# Patient Record
Sex: FEMALE | Race: WHITE | NOT HISPANIC OR LATINO | Employment: PART TIME | URBAN - NONMETROPOLITAN AREA
[De-identification: names, ages, dates, MRNs, and addresses within clinical notes are randomized per-mention and may not be internally consistent; named-entity substitution may affect disease eponyms.]

---

## 2017-01-11 ENCOUNTER — ANESTHESIA EVENT (OUTPATIENT)
Dept: GASTROENTEROLOGY | Facility: HOSPITAL | Age: 52
End: 2017-01-11

## 2017-01-13 ENCOUNTER — ANESTHESIA (OUTPATIENT)
Dept: GASTROENTEROLOGY | Facility: HOSPITAL | Age: 52
End: 2017-01-13

## 2017-01-20 ENCOUNTER — APPOINTMENT (OUTPATIENT)
Dept: MAMMOGRAPHY | Facility: HOSPITAL | Age: 52
End: 2017-01-20
Attending: INTERNAL MEDICINE

## 2017-01-20 ENCOUNTER — HOSPITAL ENCOUNTER (OUTPATIENT)
Dept: WOMENS IMAGING | Age: 52
Discharge: HOME | End: 2017-01-20
Payer: COMMERCIAL

## 2017-01-20 DIAGNOSIS — Z12.31 ENCOUNTER FOR SCREENING MAMMOGRAM FOR BREAST CANCER: ICD-10-CM

## 2017-01-20 PROCEDURE — G0202 SCR MAMMO BI INCL CAD: HCPCS

## 2017-02-03 ENCOUNTER — TELEPHONE (OUTPATIENT)
Dept: GASTROENTEROLOGY | Facility: CLINIC | Age: 52
End: 2017-02-03

## 2017-02-03 PROCEDURE — 25010000002 PROPOFOL 10 MG/ML EMULSION: Performed by: NURSE ANESTHETIST, CERTIFIED REGISTERED

## 2017-02-03 RX ORDER — LIDOCAINE HYDROCHLORIDE 20 MG/ML
INJECTION, SOLUTION INFILTRATION; PERINEURAL AS NEEDED
Status: DISCONTINUED | OUTPATIENT
Start: 2017-02-03 | End: 2017-02-03 | Stop reason: SURG

## 2017-02-03 RX ORDER — PROPOFOL 10 MG/ML
VIAL (ML) INTRAVENOUS AS NEEDED
Status: DISCONTINUED | OUTPATIENT
Start: 2017-02-03 | End: 2017-02-03 | Stop reason: SURG

## 2017-02-03 RX ADMIN — LIDOCAINE HYDROCHLORIDE 60 MG: 20 INJECTION, SOLUTION INFILTRATION; PERINEURAL at 09:06

## 2017-02-03 RX ADMIN — PROPOFOL 260 MG: 10 INJECTION, EMULSION INTRAVENOUS at 09:06

## 2017-02-03 NOTE — ANESTHESIA POSTPROCEDURE EVALUATION
Patient: Marichuy Mosqueda    Procedure Summary     Date Anesthesia Start Anesthesia Stop Room / Location    02/03/17 0902 0921  [facility] / [facility]       Procedure Diagnosis Surgeon Provider    COLONOSCOPY WITH ANESTHESIA (N/A ) Encounter for screening for malignant neoplasm of colon  (Encounter for screening for malignant neoplasm of colon [Z12.11]) DO Adriana Antoine CRNA          Anesthesia Type: general  Last vitals  BP      Temp      Pulse     Resp      SpO2        Post Anesthesia Care and Evaluation    Patient location during evaluation: PACU  Patient participation: complete - patient participated  Level of consciousness: awake and alert  Pain score: 0  Pain management: satisfactory to patient  Airway patency: patent  Anesthetic complications: No anesthetic complications  PONV Status: none  Cardiovascular status: acceptable  Respiratory status: acceptable  Hydration status: acceptable

## 2017-02-03 NOTE — ANESTHESIA PREPROCEDURE EVALUATION
Anesthesia Evaluation     Patient summary reviewed    No history of anesthetic complications   Airway   Mallampati: II  TM distance: >3 FB  Neck ROM: full  Dental      Pulmonary    (-) asthma, sleep apnea, not a smoker  Cardiovascular   Exercise tolerance: excellent (>7 METS)  (-) pacemaker, hypertension, past MI, dysrhythmias, angina, cardiac stents    ROS comment: PFO    Neuro/Psych  (-) seizures, TIA, CVA  GI/Hepatic/Renal/Endo    (+)  GERD,   (-) liver disease, renal disease, diabetes    Musculoskeletal     Abdominal    Substance History      OB/GYN          Other                             Anesthesia Plan    ASA 2     general     intravenous induction   Anesthetic plan and risks discussed with patient.

## 2017-03-01 ENCOUNTER — TELEPHONE (OUTPATIENT)
Dept: GASTROENTEROLOGY | Facility: CLINIC | Age: 52
End: 2017-03-01

## 2017-03-01 NOTE — TELEPHONE ENCOUNTER
Patient called wanting results of colon. i told her it looked good on she was put on 5 year recall..

## 2017-07-27 RX ORDER — ACYCLOVIR 400 MG/1
400 TABLET ORAL 3 TIMES DAILY
Qty: 15 TABLET | Refills: 0 | Status: SHIPPED | OUTPATIENT
Start: 2017-07-27 | End: 2017-08-01

## 2017-07-27 RX ORDER — ACYCLOVIR 200 MG/1
200 CAPSULE ORAL DAILY
COMMUNITY
Start: 2017-05-22 | End: 2017-08-19 | Stop reason: DRUGHIGH

## 2017-08-14 PROCEDURE — G0123 SCREEN CERV/VAG THIN LAYER: HCPCS | Performed by: OBSTETRICS & GYNECOLOGY

## 2017-08-15 ENCOUNTER — LAB REQUISITION (OUTPATIENT)
Dept: LAB | Facility: HOSPITAL | Age: 52
End: 2017-08-15

## 2017-08-15 DIAGNOSIS — Z12.72 ENCOUNTER FOR SCREENING FOR MALIGNANT NEOPLASM OF VAGINA: ICD-10-CM

## 2017-08-15 LAB
GEN CATEG CVX/VAG CYTO-IMP: NORMAL
LAB AP CASE REPORT: NORMAL
LAB AP GYN ADDITIONAL INFORMATION: NORMAL
Lab: NORMAL
PATH INTERP SPEC-IMP: NORMAL
STAT OF ADQ CVX/VAG CYTO-IMP: NORMAL

## 2017-08-19 ENCOUNTER — OFFICE VISIT (OUTPATIENT)
Dept: URGENT CARE | Age: 52
End: 2017-08-19
Payer: COMMERCIAL

## 2017-08-19 VITALS
WEIGHT: 169 LBS | HEIGHT: 64 IN | OXYGEN SATURATION: 98 % | HEART RATE: 84 BPM | SYSTOLIC BLOOD PRESSURE: 132 MMHG | DIASTOLIC BLOOD PRESSURE: 84 MMHG | BODY MASS INDEX: 28.85 KG/M2 | RESPIRATION RATE: 20 BRPM | TEMPERATURE: 98 F

## 2017-08-19 DIAGNOSIS — J45.901 ASTHMA EXACERBATION: Primary | ICD-10-CM

## 2017-08-19 PROCEDURE — 99213 OFFICE O/P EST LOW 20 MIN: CPT | Performed by: FAMILY MEDICINE

## 2017-08-19 RX ORDER — DOXYCYCLINE HYCLATE 100 MG
100 TABLET ORAL 2 TIMES DAILY
Qty: 20 TABLET | Refills: 0 | Status: SHIPPED | OUTPATIENT
Start: 2017-08-19 | End: 2017-08-29

## 2017-08-19 RX ORDER — ALBUTEROL SULFATE 2.5 MG/3ML
2.5 SOLUTION RESPIRATORY (INHALATION) EVERY 6 HOURS PRN
COMMUNITY
End: 2017-08-19 | Stop reason: SDUPTHER

## 2017-08-19 RX ORDER — PREDNISONE 20 MG/1
40 TABLET ORAL DAILY
Qty: 10 TABLET | Refills: 0 | Status: SHIPPED | OUTPATIENT
Start: 2017-08-19 | End: 2017-08-24

## 2017-08-19 RX ORDER — ALBUTEROL SULFATE 2.5 MG/3ML
2.5 SOLUTION RESPIRATORY (INHALATION) EVERY 6 HOURS PRN
Qty: 120 EACH | Refills: 1 | Status: SHIPPED | OUTPATIENT
Start: 2017-08-19 | End: 2020-12-28 | Stop reason: SDUPTHER

## 2017-08-19 RX ORDER — ACYCLOVIR 800 MG/1
TABLET ORAL
Refills: 2 | COMMUNITY
Start: 2017-08-14 | End: 2021-04-28 | Stop reason: SDUPTHER

## 2017-08-19 RX ORDER — ATORVASTATIN CALCIUM 10 MG/1
TABLET, FILM COATED ORAL
Refills: 3 | COMMUNITY
Start: 2017-06-27 | End: 2018-06-01 | Stop reason: SDUPTHER

## 2017-08-19 RX ORDER — CYCLOBENZAPRINE HYDROCHLORIDE 15 MG/1
CAPSULE, EXTENDED RELEASE ORAL
Refills: 5 | COMMUNITY
Start: 2017-07-20 | End: 2018-12-03 | Stop reason: CLARIF

## 2017-08-19 RX ORDER — ALBUTEROL SULFATE 90 UG/1
2 AEROSOL, METERED RESPIRATORY (INHALATION) EVERY 6 HOURS PRN
Qty: 1 INHALER | Refills: 3 | Status: SHIPPED | OUTPATIENT
Start: 2017-08-19 | End: 2020-12-28 | Stop reason: SDUPTHER

## 2017-08-19 RX ORDER — METAXALONE 800 MG/1
TABLET ORAL
COMMUNITY
Start: 2016-09-25 | End: 2019-12-18 | Stop reason: SDUPTHER

## 2017-08-19 ASSESSMENT — ENCOUNTER SYMPTOMS
RHINORRHEA: 0
DIARRHEA: 0
SINUS PRESSURE: 0
SORE THROAT: 1
ABDOMINAL PAIN: 0
NAUSEA: 0
COUGH: 1
CHEST TIGHTNESS: 0
WHEEZING: 0
VOMITING: 0
SHORTNESS OF BREATH: 0
CONSTIPATION: 0

## 2017-09-06 ENCOUNTER — OFFICE VISIT (OUTPATIENT)
Dept: INTERNAL MEDICINE | Age: 52
End: 2017-09-06
Payer: COMMERCIAL

## 2017-09-06 ENCOUNTER — HOSPITAL ENCOUNTER (OUTPATIENT)
Dept: GENERAL RADIOLOGY | Age: 52
Discharge: HOME | End: 2017-09-06
Payer: COMMERCIAL

## 2017-09-06 VITALS
TEMPERATURE: 97.8 F | HEIGHT: 65 IN | BODY MASS INDEX: 27.82 KG/M2 | OXYGEN SATURATION: 98 % | SYSTOLIC BLOOD PRESSURE: 138 MMHG | DIASTOLIC BLOOD PRESSURE: 88 MMHG | WEIGHT: 167 LBS | HEART RATE: 85 BPM

## 2017-09-06 DIAGNOSIS — J45.901 ASTHMA EXACERBATION: ICD-10-CM

## 2017-09-06 DIAGNOSIS — R06.02 SHORTNESS OF BREATH: ICD-10-CM

## 2017-09-06 DIAGNOSIS — J40 BRONCHITIS: Chronic | ICD-10-CM

## 2017-09-06 DIAGNOSIS — J06.9 UPPER RESPIRATORY TRACT INFECTION, UNSPECIFIED TYPE: Primary | ICD-10-CM

## 2017-09-06 DIAGNOSIS — R05.9 COUGH: ICD-10-CM

## 2017-09-06 PROCEDURE — 71020 XR CHEST STANDARD TWO VW: CPT

## 2017-09-06 PROCEDURE — 96372 THER/PROPH/DIAG INJ SC/IM: CPT | Performed by: NURSE PRACTITIONER

## 2017-09-06 PROCEDURE — 99214 OFFICE O/P EST MOD 30 MIN: CPT | Performed by: NURSE PRACTITIONER

## 2017-09-06 RX ORDER — LEVOFLOXACIN 500 MG/1
500 TABLET, FILM COATED ORAL DAILY
Qty: 10 TABLET | Refills: 0 | Status: SHIPPED | OUTPATIENT
Start: 2017-09-06 | End: 2017-09-16

## 2017-09-06 RX ORDER — METHYLPREDNISOLONE ACETATE 80 MG/ML
80 INJECTION, SUSPENSION INTRA-ARTICULAR; INTRALESIONAL; INTRAMUSCULAR; SOFT TISSUE ONCE
Status: COMPLETED | OUTPATIENT
Start: 2017-09-06 | End: 2017-09-06

## 2017-09-06 RX ADMIN — METHYLPREDNISOLONE ACETATE 80 MG: 80 INJECTION, SUSPENSION INTRA-ARTICULAR; INTRALESIONAL; INTRAMUSCULAR; SOFT TISSUE at 11:49

## 2017-09-06 ASSESSMENT — ENCOUNTER SYMPTOMS
COLOR CHANGE: 0
VOICE CHANGE: 0
PHOTOPHOBIA: 0
NAUSEA: 0
RHINORRHEA: 0
SHORTNESS OF BREATH: 1
COUGH: 1
BACK PAIN: 0
VOMITING: 0

## 2017-09-07 ENCOUNTER — TELEPHONE (OUTPATIENT)
Dept: INTERNAL MEDICINE | Age: 52
End: 2017-09-07

## 2017-11-07 PROBLEM — I10 ESSENTIAL HYPERTENSION: Status: ACTIVE | Noted: 2017-11-07

## 2017-11-07 PROBLEM — F33.2 ENDOGENOUS DEPRESSION (HCC): Status: ACTIVE | Noted: 2017-11-07

## 2017-11-07 PROBLEM — Z12.11 SCREENING FOR COLORECTAL CANCER: Status: ACTIVE | Noted: 2017-11-07

## 2017-11-07 PROBLEM — Z12.12 SCREENING FOR COLORECTAL CANCER: Status: ACTIVE | Noted: 2017-11-07

## 2017-11-07 PROBLEM — E78.00 PURE HYPERCHOLESTEROLEMIA: Status: ACTIVE | Noted: 2017-11-07

## 2017-11-07 PROBLEM — K21.9 GASTROESOPHAGEAL REFLUX DISEASE WITHOUT ESOPHAGITIS: Status: ACTIVE | Noted: 2017-11-07

## 2017-11-07 PROBLEM — R73.01 IFG (IMPAIRED FASTING GLUCOSE): Status: ACTIVE | Noted: 2017-11-07

## 2017-11-07 PROBLEM — Z00.00 ANNUAL PHYSICAL EXAM: Status: ACTIVE | Noted: 2017-11-07

## 2017-11-07 PROBLEM — R74.01 ELEVATED TRANSAMINASE LEVEL: Status: ACTIVE | Noted: 2017-11-07

## 2017-11-07 PROBLEM — J45.901 ASTHMA EXACERBATION: Status: RESOLVED | Noted: 2017-08-19 | Resolved: 2017-11-07

## 2017-11-07 PROBLEM — Q21.12 PFO (PATENT FORAMEN OVALE): Status: ACTIVE | Noted: 2017-11-07

## 2017-11-13 ENCOUNTER — TELEPHONE (OUTPATIENT)
Dept: INTERNAL MEDICINE | Age: 52
End: 2017-11-13

## 2017-11-15 DIAGNOSIS — I10 ESSENTIAL HYPERTENSION: ICD-10-CM

## 2017-11-15 DIAGNOSIS — E78.00 PURE HYPERCHOLESTEROLEMIA: ICD-10-CM

## 2017-11-15 DIAGNOSIS — Z00.00 ANNUAL PHYSICAL EXAM: Primary | ICD-10-CM

## 2017-11-15 DIAGNOSIS — R73.01 IFG (IMPAIRED FASTING GLUCOSE): ICD-10-CM

## 2017-11-17 DIAGNOSIS — R73.01 IFG (IMPAIRED FASTING GLUCOSE): ICD-10-CM

## 2017-11-17 DIAGNOSIS — E78.00 PURE HYPERCHOLESTEROLEMIA: ICD-10-CM

## 2017-11-17 DIAGNOSIS — I10 ESSENTIAL HYPERTENSION: ICD-10-CM

## 2017-11-17 DIAGNOSIS — Z00.00 ANNUAL PHYSICAL EXAM: ICD-10-CM

## 2017-11-17 LAB
ALBUMIN SERPL-MCNC: 4.1 G/DL (ref 3.5–5.2)
ALP BLD-CCNC: 84 U/L (ref 35–104)
ALT SERPL-CCNC: 60 U/L (ref 5–33)
ANION GAP SERPL CALCULATED.3IONS-SCNC: 11 MMOL/L (ref 7–19)
AST SERPL-CCNC: 33 U/L (ref 5–32)
BASOPHILS ABSOLUTE: 0 K/UL (ref 0–0.2)
BASOPHILS RELATIVE PERCENT: 0.7 % (ref 0–1)
BILIRUB SERPL-MCNC: 0.6 MG/DL (ref 0.2–1.2)
BILIRUBIN URINE: NEGATIVE
BLOOD, URINE: NEGATIVE
BUN BLDV-MCNC: 16 MG/DL (ref 6–20)
CALCIUM SERPL-MCNC: 9.3 MG/DL (ref 8.6–10)
CHLORIDE BLD-SCNC: 105 MMOL/L (ref 98–111)
CHOLESTEROL, TOTAL: 188 MG/DL (ref 160–199)
CLARITY: CLEAR
CO2: 26 MMOL/L (ref 22–29)
COLOR: YELLOW
CREAT SERPL-MCNC: 0.5 MG/DL (ref 0.5–0.9)
EOSINOPHILS ABSOLUTE: 0.2 K/UL (ref 0–0.6)
EOSINOPHILS RELATIVE PERCENT: 3.2 % (ref 0–5)
GFR NON-AFRICAN AMERICAN: >60
GLUCOSE BLD-MCNC: 88 MG/DL (ref 74–109)
GLUCOSE URINE: NEGATIVE MG/DL
HBA1C MFR BLD: 5.2 %
HCT VFR BLD CALC: 39.3 % (ref 37–47)
HDLC SERPL-MCNC: 64 MG/DL (ref 65–121)
HEMOGLOBIN: 11.9 G/DL (ref 12–16)
KETONES, URINE: NEGATIVE MG/DL
LDL CHOLESTEROL CALCULATED: 105 MG/DL
LEUKOCYTE ESTERASE, URINE: NEGATIVE
LYMPHOCYTES ABSOLUTE: 2 K/UL (ref 1.1–4.5)
LYMPHOCYTES RELATIVE PERCENT: 37.7 % (ref 20–40)
MCH RBC QN AUTO: 22 PG (ref 27–31)
MCHC RBC AUTO-ENTMCNC: 30.3 G/DL (ref 33–37)
MCV RBC AUTO: 72.5 FL (ref 81–99)
MONOCYTES ABSOLUTE: 0.5 K/UL (ref 0–0.9)
MONOCYTES RELATIVE PERCENT: 9.3 % (ref 0–10)
NEUTROPHILS ABSOLUTE: 2.6 K/UL (ref 1.5–7.5)
NEUTROPHILS RELATIVE PERCENT: 48.7 % (ref 50–65)
NITRITE, URINE: NEGATIVE
PDW BLD-RTO: 16.2 % (ref 11.5–14.5)
PH UA: 6.5
PLATELET # BLD: 290 K/UL (ref 130–400)
PMV BLD AUTO: 11.1 FL (ref 9.4–12.3)
POTASSIUM SERPL-SCNC: 4.1 MMOL/L (ref 3.5–5)
PROTEIN UA: NEGATIVE MG/DL
RBC # BLD: 5.42 M/UL (ref 4.2–5.4)
SODIUM BLD-SCNC: 142 MMOL/L (ref 136–145)
SPECIFIC GRAVITY UA: 1.02
TOTAL PROTEIN: 6.8 G/DL (ref 6.6–8.7)
TRIGL SERPL-MCNC: 95 MG/DL (ref 0–149)
TSH SERPL DL<=0.05 MIU/L-ACNC: 2.71 UIU/ML (ref 0.27–4.2)
UROBILINOGEN, URINE: 0.2 E.U./DL
WBC # BLD: 5.4 K/UL (ref 4.8–10.8)

## 2017-11-27 ENCOUNTER — OFFICE VISIT (OUTPATIENT)
Dept: INTERNAL MEDICINE | Age: 52
End: 2017-11-27
Payer: COMMERCIAL

## 2017-11-27 VITALS
DIASTOLIC BLOOD PRESSURE: 78 MMHG | BODY MASS INDEX: 29.02 KG/M2 | OXYGEN SATURATION: 98 % | HEIGHT: 64 IN | HEART RATE: 86 BPM | SYSTOLIC BLOOD PRESSURE: 138 MMHG | WEIGHT: 170 LBS

## 2017-11-27 DIAGNOSIS — R74.01 ELEVATED TRANSAMINASE LEVEL: ICD-10-CM

## 2017-11-27 DIAGNOSIS — Z12.31 SCREENING MAMMOGRAM, ENCOUNTER FOR: ICD-10-CM

## 2017-11-27 DIAGNOSIS — E28.39 MENOPAUSE OVARIAN FAILURE: ICD-10-CM

## 2017-11-27 DIAGNOSIS — Q21.12 PFO (PATENT FORAMEN OVALE): ICD-10-CM

## 2017-11-27 DIAGNOSIS — R73.01 IFG (IMPAIRED FASTING GLUCOSE): ICD-10-CM

## 2017-11-27 DIAGNOSIS — I10 ESSENTIAL HYPERTENSION: ICD-10-CM

## 2017-11-27 DIAGNOSIS — Z12.11 SCREENING FOR COLORECTAL CANCER: ICD-10-CM

## 2017-11-27 DIAGNOSIS — Z00.00 ANNUAL PHYSICAL EXAM: Primary | ICD-10-CM

## 2017-11-27 DIAGNOSIS — Z12.12 SCREENING FOR COLORECTAL CANCER: ICD-10-CM

## 2017-11-27 DIAGNOSIS — E78.00 PURE HYPERCHOLESTEROLEMIA: ICD-10-CM

## 2017-11-27 PROCEDURE — 99396 PREV VISIT EST AGE 40-64: CPT | Performed by: INTERNAL MEDICINE

## 2017-11-27 RX ORDER — INFLUENZA VIRUS VACCINE 15; 15; 15; 15 UG/.5ML; UG/.5ML; UG/.5ML; UG/.5ML
SUSPENSION INTRAMUSCULAR
COMMUNITY
Start: 2017-09-23 | End: 2017-11-27 | Stop reason: CLARIF

## 2017-11-27 ASSESSMENT — ENCOUNTER SYMPTOMS
BLOOD IN STOOL: 0
CONSTIPATION: 0
EYE PAIN: 0
ABDOMINAL PAIN: 0
VOICE CHANGE: 0
VOMITING: 0
COLOR CHANGE: 0
SORE THROAT: 0
WHEEZING: 0
EYE REDNESS: 0
DIARRHEA: 0
COUGH: 0
TROUBLE SWALLOWING: 0
SINUS PRESSURE: 0
NAUSEA: 0
CHEST TIGHTNESS: 0

## 2017-11-27 ASSESSMENT — PATIENT HEALTH QUESTIONNAIRE - PHQ9
SUM OF ALL RESPONSES TO PHQ QUESTIONS 1-9: 0
2. FEELING DOWN, DEPRESSED OR HOPELESS: 0
SUM OF ALL RESPONSES TO PHQ9 QUESTIONS 1 & 2: 0
1. LITTLE INTEREST OR PLEASURE IN DOING THINGS: 0

## 2017-11-27 NOTE — PROGRESS NOTES
(ZOVIRAX) 800 MG tablet TK 1 T PO D  2    AMRIX 15 MG extended release capsule TAKE 1 CAPSULE BY MOUTH EVERY DAY AT BEDTIME  5    albuterol sulfate HFA (PROAIR HFA) 108 (90 Base) MCG/ACT inhaler Inhale 2 puffs into the lungs every 6 hours as needed for Wheezing 1 Inhaler 3    albuterol (PROVENTIL) (2.5 MG/3ML) 0.083% nebulizer solution Take 3 mLs by nebulization every 6 hours as needed for Wheezing 120 each 1    aspirin 81 MG tablet Take 81 mg by mouth daily      Cetirizine HCl (ZYRTEC PO) Take 10 mg by mouth daily       Multiple Vitamins-Minerals (CERTA-MARY WITH MINERALS ORAL) solution Take 15 mLs by mouth daily       No current facility-administered medications for this visit.       Allergies   Allergen Reactions    Azithromycin Other (See Comments)     Chest pain/irruglar heat beat        Meperidine Other (See Comments)     unsure    Morphine Hives       Social History     Social History    Marital status:      Spouse name: N/A    Number of children: N/A    Years of education: N/A     Social History Main Topics    Smoking status: Never Smoker    Smokeless tobacco: Never Used    Alcohol use No    Drug use: No    Sexual activity: Not Asked     Other Topics Concern    None     Social History Narrative    None     Family History   Problem Relation Age of Onset    Coronary Art Dis Mother     Diabetes Mother     High Blood Pressure Mother     Other Mother      thalassemia minor       Past Surgical History:   Procedure Laterality Date    BREAST SURGERY      CHOLECYSTECTOMY      HYSTERECTOMY, VAGINAL      one ovary remians         Lab Review   Orders Only on 11/17/2017   Component Date Value    WBC 11/17/2017 5.4     RBC 11/17/2017 5.42*    Hemoglobin 11/17/2017 11.9*    Hematocrit 11/17/2017 39.3     MCV 11/17/2017 72.5*    MCH 11/17/2017 22.0*    MCHC 11/17/2017 30.3*    RDW 11/17/2017 16.2*    Platelets 28/45/2625 290     MPV 11/17/2017 11.1     Neutrophils % 11/17/2017 place, and time. She has normal reflexes. No cranial nerve deficit. Coordination normal.   Skin: Skin is warm and dry. No rash noted. No erythema. Psychiatric: She has a normal mood and affect. Her behavior is normal.         ASSESSMENT/PLAN    Annual physical exam  *Screening for colorectal cancer 2017/5 yrs  * mammogram/pelvic as per GYN    Essential hypertension-blood pressure has been well controlled-currently no prescription is needed    IFG (impaired fasting glucose)  Continue good diet efforts, A1c is now normal at 5.2    Pure hypercholesterolemia-LDL is good range, patient will continue Lipitor 10 mg daily    Elevated transaminase levels, improved compared to lab testing that was done a year ago. Her AST was 48/now 33 and her ALT was 80/now 60    Slightly borderline hemoglobin 11.9, was 13 a year ago/ repeat cbc  + get ferritin 6 mo/ she has FHX thalassemia minor ( pt has also low MCV)    PFO/prior history of TIA. Per patient request refer back to Dr. Lindsay Brochure, she has seen him in the past for the same problem. Over the last 6 months guidelines have changed regarding management of with  prior history of TIA    Feeling well / no complaints  Orders Placed This Encounter   Procedures    BHASKAR DIGITAL SCREEN W CAD BILATERAL    DEXA BONE DENSITY 2 SITES    Lipid Panel    Comprehensive Metabolic Panel    Hemoglobin A1C    CBC Auto Differential    Ambulatory referral to Cardiology     New Prescriptions    No medications on file      There are no Patient Instructions on file for this visit. Return in about 6 months (around 5/27/2018) for Medication check. EMR Dragon/transcription disclaimer:Significant part of this  encounter note is electronic transcription/translation of spoken language to printed text. The electronic translation of spoken language may be erroneous, or at times, nonsensical words or phrases may be inadvertently transcribed.  Although I have reviewed the note for such errors, some may still

## 2018-01-18 ENCOUNTER — OFFICE VISIT (OUTPATIENT)
Dept: CARDIOLOGY | Facility: CLINIC | Age: 53
End: 2018-01-18

## 2018-01-18 VITALS
WEIGHT: 175 LBS | BODY MASS INDEX: 29.88 KG/M2 | HEIGHT: 64 IN | OXYGEN SATURATION: 98 % | HEART RATE: 95 BPM | DIASTOLIC BLOOD PRESSURE: 86 MMHG | SYSTOLIC BLOOD PRESSURE: 124 MMHG

## 2018-01-18 DIAGNOSIS — I25.3 PFO WITH ATRIAL SEPTAL ANEURYSM: Primary | ICD-10-CM

## 2018-01-18 DIAGNOSIS — I44.7 LBBB (LEFT BUNDLE BRANCH BLOCK): ICD-10-CM

## 2018-01-18 DIAGNOSIS — E78.2 MIXED HYPERLIPIDEMIA: ICD-10-CM

## 2018-01-18 DIAGNOSIS — Q21.12 PFO WITH ATRIAL SEPTAL ANEURYSM: Primary | ICD-10-CM

## 2018-01-18 DIAGNOSIS — R00.2 PALPITATIONS: ICD-10-CM

## 2018-01-18 PROCEDURE — 99204 OFFICE O/P NEW MOD 45 MIN: CPT | Performed by: INTERNAL MEDICINE

## 2018-01-19 PROBLEM — E78.5 HYPERLIPIDEMIA: Status: ACTIVE | Noted: 2018-01-19

## 2018-01-19 PROBLEM — I44.7 LBBB (LEFT BUNDLE BRANCH BLOCK): Status: ACTIVE | Noted: 2018-01-19

## 2018-01-19 PROCEDURE — 93000 ELECTROCARDIOGRAM COMPLETE: CPT | Performed by: INTERNAL MEDICINE

## 2018-01-19 NOTE — PROGRESS NOTES
Subjective:     Encounter Date:01/18/2018      Patient ID: Marichuy Mosqueda is a 52 y.o. female with hyperlipidemia who is referred for further evaluation of a PFO.    Chief Complaint: Referral for further evaluation of PFO    Heart Problem   This is a chronic (PFO) problem. Progression since onset: No new symptoms. Pertinent negatives include no abdominal pain, change in bowel habit, chest pain, chills, congestion, coughing, diaphoresis, fatigue, fever, headaches, joint swelling, myalgias, nausea, neck pain, numbness, rash, vertigo, visual change, vomiting or weakness. She has tried nothing for the symptoms.   Palpitations    This is a new problem. The current episode started more than 1 month ago. The problem occurs intermittently. The problem has been waxing and waning. Nothing aggravates the symptoms. Pertinent negatives include no anxiety, chest fullness, chest pain, coughing, diaphoresis, dizziness, fever, irregular heartbeat, malaise/fatigue, nausea, near-syncope, numbness, shortness of breath, syncope, vomiting or weakness. She has tried nothing for the symptoms. Risk factors include dyslipidemia. There is no history of drug use, heart disease or hyperthyroidism.      This is a 52-year-old white female history of hyperlipidemia, chronic left bundle branch block and patent foramen ovale who presents on referral from her primary care physician for further evaluation of her PFO.  The patient has previously seen Dr. Quan and Dr. Tinsley a number of years ago after she had a transient vision change.  At that time, no closure of her patent foramen ovale was recommended, and she had also been seen by neurology at that time who agreed.  Given some change in recent literature regarding PFO closure, the patient was referred here for discussion once again.  The patient says that she has not had any further neurologic type symptoms.  The patient denies any significant lightheadedness, dizziness, syncope, visual  changes, numbness, weakness, tingling, focal deficits.  The patient denies chest pain, shortness of breath, dyspnea on exertion, orthopnea, PND, edema.  In general, the patient's blood pressure is well-controlled.  Her lipids are followed by her primary care physician and are reportedly well controlled.  The patient does note that she will have some occasional palpitations.  She describes this as a feeling like her heart may be racing or fluttering or flip-flopping.  These are generally self terminating episodes that last no more than a few minutes.  She has had no other associated signs or symptoms with her palpitations.    The following portions of the patient's history were reviewed and updated as appropriate: allergies, current medications, past family history, past medical history, past social history, past surgical history and problem list.     Past Medical History:   Diagnosis Date   • Breast lump    • Endometriosis    • Genital herpes    • Hyperlipidemia    • Hypertension    • Left bundle branch block    • PFO with atrial septal aneurysm    • PONV (postoperative nausea and vomiting)      Past Surgical History:   Procedure Laterality Date   • BREAST SURGERY     • CHOLECYSTECTOMY     • COLONOSCOPY  2000    normal   • COLONOSCOPY N/A 2/3/2017    Procedure: COLONOSCOPY WITH ANESTHESIA;  Surgeon: Ton Cooper DO;  Location: [facility] ENDOSCOPY;  Service:    • HYSTERECTOMY      partial       Current Outpatient Prescriptions:   •  acyclovir (ZOVIRAX) 200 MG capsule, Take  by mouth Every 4 (Four) Hours While Awake., Disp: , Rfl:   •  AMRIX 15 MG 24 hr capsule, , Disp: , Rfl: 5  •  aspirin 81 MG EC tablet, Take 81 mg by mouth Daily., Disp: , Rfl:   •  atorvastatin (LIPITOR) 10 MG tablet, TK 1 T PO QD, Disp: , Rfl: 3  •  cetirizine (zyrTEC) 10 MG tablet, Take 10 mg by mouth Daily., Disp: , Rfl:   •  Multiple Vitamin (MULTI VITAMIN PO), Take  by mouth Daily., Disp: , Rfl:   •  metaxalone (SKELAXIN) 800 MG tablet, TK  1/2 T PO Q 12 H PRN, Disp: , Rfl: 5  •  Nutritional Supplements (ESTROVEN PO), Take  by mouth., Disp: , Rfl:   •  pantoprazole (PROTONIX) 40 MG EC tablet, Take 40 mg by mouth Daily., Disp: , Rfl:   •  Sod Picosulfate-Mag Ox-Cit Acd (PREPOPIK) 10-3.5-12 MG-GM-GM pack, Take as directed, Disp: 1 each, Rfl: 0    Allergies   Allergen Reactions   • Azithromycin Other (See Comments)     Chest pain   • Lortab [Hydrocodone-Acetaminophen] Itching   • Morphine Hives     Social History   Substance Use Topics   • Smoking status: Never Smoker   • Smokeless tobacco: Never Used   • Alcohol use Yes      Comment: Socially     Family History   Problem Relation Age of Onset   • Heart disease Mother    • Heart disease Father    • No Known Problems Sister    • Colon cancer Neg Hx    • Colon polyps Neg Hx    • Esophageal cancer Neg Hx    • Liver cancer Neg Hx    • Liver disease Neg Hx    • Rectal cancer Neg Hx    • Stomach cancer Neg Hx      Review of Systems   Constitution: Negative for chills, diaphoresis, fatigue, fever, weakness, malaise/fatigue, night sweats and weight loss.   HENT: Negative for congestion and hearing loss.    Eyes: Negative for blurred vision and pain.   Cardiovascular: Positive for palpitations. Negative for chest pain, claudication, dyspnea on exertion, irregular heartbeat, leg swelling, near-syncope, orthopnea, paroxysmal nocturnal dyspnea and syncope.   Respiratory: Negative for cough, hemoptysis, shortness of breath and wheezing.    Endocrine: Negative for cold intolerance, heat intolerance, polydipsia and polyuria.   Hematologic/Lymphatic: Negative for adenopathy and bleeding problem. Does not bruise/bleed easily.   Skin: Negative for color change, poor wound healing and rash.   Musculoskeletal: Negative for arthritis, back pain, joint pain, joint swelling, myalgias and neck pain.   Gastrointestinal: Negative for abdominal pain, change in bowel habit, constipation, diarrhea, heartburn, hematochezia, melena,  nausea and vomiting.   Genitourinary: Negative for bladder incontinence, dysuria, frequency, hematuria and nocturia.   Neurological: Negative for dizziness, focal weakness, headaches, light-headedness, loss of balance, numbness, seizures and vertigo.   Psychiatric/Behavioral: Negative for altered mental status, memory loss and substance abuse. The patient is not nervous/anxious.    Allergic/Immunologic: Negative for hives and persistent infections.         ECG 12 Lead  Date/Time: 1/19/2018 2:14 PM  Performed by: GRACIE CRANEY  Authorized by: GRACIE CARNEY   Comparison: compared with previous ECG from 11/14/2014  Similar to previous ECG  Rhythm: sinus rhythm  Rate: normal  Conduction: left bundle branch block  QRS axis: normal  Other: no other findings  Clinical impression: abnormal ECG               Objective:     Physical Exam   Constitutional: She is oriented to person, place, and time. Vital signs are normal. She appears well-developed and well-nourished. She is cooperative.  Non-toxic appearance. No distress.   HENT:   Head: Normocephalic and atraumatic.   Right Ear: External ear normal.   Left Ear: External ear normal.   Nose: Nose normal.   Mouth/Throat: Uvula is midline, oropharynx is clear and moist and mucous membranes are normal. Mucous membranes are not pale, not dry and not cyanotic. No oropharyngeal exudate.   Eyes: EOM and lids are normal. Pupils are equal, round, and reactive to light.   Neck: Normal range of motion. Neck supple. No hepatojugular reflux and no JVD present. Carotid bruit is not present. No tracheal deviation and no edema present. No thyroid mass and no thyromegaly present.   Cardiovascular: Normal rate, regular rhythm, S1 normal, S2 normal, normal heart sounds, intact distal pulses and normal pulses.   No extrasystoles are present. PMI is not displaced.  Exam reveals no gallop and no friction rub.    No murmur heard.  Pulses:       Radial pulses are 2+ on the right  "side, and 2+ on the left side.        Femoral pulses are 2+ on the right side, and 2+ on the left side.       Dorsalis pedis pulses are 2+ on the right side, and 2+ on the left side.        Posterior tibial pulses are 2+ on the right side, and 2+ on the left side.   Pulmonary/Chest: Effort normal and breath sounds normal. No accessory muscle usage. No respiratory distress. She has no wheezes. She has no rales. She exhibits no tenderness.   Abdominal: Soft. Normal appearance and bowel sounds are normal. She exhibits no distension, no abdominal bruit and no pulsatile midline mass. There is no hepatosplenomegaly. There is no tenderness.   Musculoskeletal: Normal range of motion. She exhibits no edema, tenderness or deformity.   Lymphadenopathy:     She has no cervical adenopathy.   Neurological: She is oriented to person, place, and time. She has normal strength. No cranial nerve deficit.   Skin: Skin is warm, dry and intact. No rash noted. She is not diaphoretic. No cyanosis or erythema. Nails show no clubbing.   Psychiatric: She has a normal mood and affect. Her speech is normal and behavior is normal. Thought content normal.   Nursing note and vitals reviewed.    /86 (BP Location: Left arm, Patient Position: Sitting)  Pulse 95  Ht 162.6 cm (64\")  Wt 79.4 kg (175 lb)  SpO2 98%  BMI 30.04 kg/m2    Data/Lab Review:     I reviewed previous office visit notes with Dr. Tinsley on 4/3/13    I reviewed the report of a Lexiscan nuclear stress test in February 2013 which showed no obvious evidence of ischemia and normal left ventricular ejection fraction.    I reviewed the report of a previous CT angiogram of the head on 3/7/13: Negative CT angiography of the head.    I have reviewed the patient's most recent lipid panel from 11/17/17: LDL cholesterol 105, HDL cholesterol 64, triglycerides 95      Assessment:          Diagnosis Plan   1. PFO with atrial septal aneurysm     2. Palpitations  Holter Monitor - 24 Hour "    Adult Transthoracic Echo Complete W/ Cont if Necessary Per Protocol    ECG 12 Lead   3. LBBB (left bundle branch block)     4. Mixed hyperlipidemia            Plan:       1.  Patent foramen ovale with atrial septal aneurysm: At this time, the patient has had no obvious neurologic events.  While she does have a PFO, she is not considered to be symptomatic with no previous evidence of neurologic events by prior imaging.  Despite some literature suggesting PFO closure, I am still not inclined for closure at this particular time given no recurrent neurologic symptoms.  I discussed this with the patient and she is agreeable at this time.  Certainly, should this patient have recurrent neurological symptoms, we could consider readdressing the issue at that time.    2.  Palpitations: The patient does complain of intermittent palpitations.  Her symptoms seem to be clinically significant but only happening intermittently and lasting minutes at a time.  I think that it is reasonable to investigate this further with a Holter monitor as she says that her symptoms are happening on a daily basis at the current time.  In addition, I think it is reasonable to get an echocardiogram once again, however without a bubble study, to investigate for any appreciable valvular abnormalities or any structural abnormalities.    3.  Left bundle-branch block: The patient is a chronic left bundle-branch block that is been present since at least 2013.  She has had a nuclear stress test in the past which did not show any evidence of ischemia.  No further workup is indicated at this time.    4.  Mixed hyperlipidemia: The patient remains on statin therapy which she is tolerating well.  I did review her most recent lipid panel which shows good control of her LDL cholesterol.    Follow-up: We will plan to see the patient again next year unless needed sooner or as indicated by the results of her cardiac testing.    EMR Dragon/Transcription disclaimer:  Much of this encounter note is an electronic transcription/translation of spoken language to printed text. The electronic translation of spoken language may permit erroneous, or at times, nonsensical words or phrases to be inadvertently transcribed; although I have reviewed the note for such errors, some may still exist.

## 2018-01-25 ENCOUNTER — HOSPITAL ENCOUNTER (OUTPATIENT)
Dept: CARDIOLOGY | Facility: HOSPITAL | Age: 53
Discharge: HOME | End: 2018-01-25
Attending: INTERNAL MEDICINE | Admitting: INTERNAL MEDICINE

## 2018-01-25 VITALS
DIASTOLIC BLOOD PRESSURE: 86 MMHG | WEIGHT: 175 LBS | SYSTOLIC BLOOD PRESSURE: 124 MMHG | HEIGHT: 64 IN | BODY MASS INDEX: 29.88 KG/M2

## 2018-01-25 DIAGNOSIS — R00.2 PALPITATIONS: ICD-10-CM

## 2018-01-25 PROCEDURE — 93306 TTE W/DOPPLER COMPLETE: CPT | Performed by: INTERNAL MEDICINE

## 2018-01-25 PROCEDURE — 93306 TTE W/DOPPLER COMPLETE: CPT

## 2018-01-26 ENCOUNTER — APPOINTMENT (OUTPATIENT)
Dept: CARDIOLOGY | Facility: HOSPITAL | Age: 53
End: 2018-01-26
Attending: INTERNAL MEDICINE

## 2018-01-26 LAB
BH CV ECHO MEAS - AO MAX PG (FULL): 7.9 MMHG
BH CV ECHO MEAS - AO MAX PG: 12.1 MMHG
BH CV ECHO MEAS - AO MEAN PG (FULL): 5 MMHG
BH CV ECHO MEAS - AO MEAN PG: 8 MMHG
BH CV ECHO MEAS - AO ROOT AREA (BSA CORRECTED): 1.5
BH CV ECHO MEAS - AO ROOT AREA: 5.7 CM^2
BH CV ECHO MEAS - AO ROOT DIAM: 2.7 CM
BH CV ECHO MEAS - AO V2 MAX: 174 CM/SEC
BH CV ECHO MEAS - AO V2 MEAN: 136 CM/SEC
BH CV ECHO MEAS - AO V2 VTI: 41.5 CM
BH CV ECHO MEAS - AVA(I,A): 2.1 CM^2
BH CV ECHO MEAS - AVA(I,D): 2.1 CM^2
BH CV ECHO MEAS - AVA(V,A): 1.9 CM^2
BH CV ECHO MEAS - AVA(V,D): 1.9 CM^2
BH CV ECHO MEAS - BSA(HAYCOCK): 1.9 M^2
BH CV ECHO MEAS - BSA: 1.8 M^2
BH CV ECHO MEAS - BZI_BMI: 30 KILOGRAMS/M^2
BH CV ECHO MEAS - BZI_METRIC_HEIGHT: 162.6 CM
BH CV ECHO MEAS - BZI_METRIC_WEIGHT: 79.4 KG
BH CV ECHO MEAS - CONTRAST EF 4CH: 57.4 ML/M^2
BH CV ECHO MEAS - EDV(CUBED): 103.2 ML
BH CV ECHO MEAS - EDV(MOD-SP4): 70.9 ML
BH CV ECHO MEAS - EDV(TEICH): 101.9 ML
BH CV ECHO MEAS - EF(CUBED): 60.9 %
BH CV ECHO MEAS - EF(TEICH): 52.4 %
BH CV ECHO MEAS - ESV(CUBED): 40.4 ML
BH CV ECHO MEAS - ESV(MOD-SP4): 30.2 ML
BH CV ECHO MEAS - ESV(TEICH): 48.5 ML
BH CV ECHO MEAS - FS: 26.9 %
BH CV ECHO MEAS - IVS/LVPW: 1.1
BH CV ECHO MEAS - IVSD: 1.8 CM
BH CV ECHO MEAS - LA DIMENSION: 4.1 CM
BH CV ECHO MEAS - LA/AO: 1.5
BH CV ECHO MEAS - LV DIASTOLIC VOL/BSA (35-75): 38.4 ML/M^2
BH CV ECHO MEAS - LV MASS(C)D: 370.2 GRAMS
BH CV ECHO MEAS - LV MASS(C)DI: 200.3 GRAMS/M^2
BH CV ECHO MEAS - LV MAX PG: 4.2 MMHG
BH CV ECHO MEAS - LV MEAN PG: 3 MMHG
BH CV ECHO MEAS - LV SYSTOLIC VOL/BSA (12-30): 16.3 ML/M^2
BH CV ECHO MEAS - LV V1 MAX: 103 CM/SEC
BH CV ECHO MEAS - LV V1 MEAN: 74.7 CM/SEC
BH CV ECHO MEAS - LV V1 VTI: 27.6 CM
BH CV ECHO MEAS - LVIDD: 4.7 CM
BH CV ECHO MEAS - LVIDS: 3.4 CM
BH CV ECHO MEAS - LVLD AP4: 7.3 CM
BH CV ECHO MEAS - LVLS AP4: 6.3 CM
BH CV ECHO MEAS - LVOT AREA (M): 3.1 CM^2
BH CV ECHO MEAS - LVOT AREA: 3.1 CM^2
BH CV ECHO MEAS - LVOT DIAM: 2 CM
BH CV ECHO MEAS - LVPWD: 1.7 CM
BH CV ECHO MEAS - MR MAX PG: 33.9 MMHG
BH CV ECHO MEAS - MR MAX VEL: 291 CM/SEC
BH CV ECHO MEAS - MV DEC TIME: 0.17 SEC
BH CV ECHO MEAS - MV E MAX VEL: 96.5 CM/SEC
BH CV ECHO MEAS - SI(AO): 128.6 ML/M^2
BH CV ECHO MEAS - SI(CUBED): 34 ML/M^2
BH CV ECHO MEAS - SI(LVOT): 46.9 ML/M^2
BH CV ECHO MEAS - SI(MOD-SP4): 22 ML/M^2
BH CV ECHO MEAS - SI(TEICH): 28.9 ML/M^2
BH CV ECHO MEAS - SV(AO): 237.6 ML
BH CV ECHO MEAS - SV(CUBED): 62.8 ML
BH CV ECHO MEAS - SV(LVOT): 86.7 ML
BH CV ECHO MEAS - SV(MOD-SP4): 40.7 ML
BH CV ECHO MEAS - SV(TEICH): 53.4 ML
E/E' RATIO: 23.4
LEFT ATRIUM VOLUME INDEX: 23.9 ML/M2
LEFT ATRIUM VOLUME: 44.2 CM3
MAXIMAL PREDICTED HEART RATE: 168 BPM
STRESS TARGET HR: 143 BPM

## 2018-02-01 ENCOUNTER — HOSPITAL ENCOUNTER (OUTPATIENT)
Dept: WOMENS IMAGING | Age: 53
Discharge: HOME | End: 2018-02-01
Payer: COMMERCIAL

## 2018-02-01 DIAGNOSIS — Z12.31 SCREENING MAMMOGRAM, ENCOUNTER FOR: ICD-10-CM

## 2018-02-01 DIAGNOSIS — E28.39 MENOPAUSE OVARIAN FAILURE: ICD-10-CM

## 2018-02-01 PROBLEM — M85.852 OSTEOPENIA OF LEFT THIGH: Status: ACTIVE | Noted: 2018-02-01

## 2018-02-01 PROCEDURE — 77063 BREAST TOMOSYNTHESIS BI: CPT

## 2018-02-01 PROCEDURE — 77080 DXA BONE DENSITY AXIAL: CPT

## 2018-02-06 ENCOUNTER — HOSPITAL ENCOUNTER (OUTPATIENT)
Dept: GENERAL RADIOLOGY | Age: 53
Discharge: HOME | End: 2018-02-06
Payer: COMMERCIAL

## 2018-02-06 ENCOUNTER — OFFICE VISIT (OUTPATIENT)
Dept: URGENT CARE | Age: 53
End: 2018-02-06
Payer: COMMERCIAL

## 2018-02-06 VITALS
HEIGHT: 64 IN | DIASTOLIC BLOOD PRESSURE: 82 MMHG | SYSTOLIC BLOOD PRESSURE: 153 MMHG | BODY MASS INDEX: 29.88 KG/M2 | RESPIRATION RATE: 18 BRPM | WEIGHT: 175 LBS | TEMPERATURE: 97.4 F | HEART RATE: 82 BPM | OXYGEN SATURATION: 98 %

## 2018-02-06 DIAGNOSIS — R07.81 RIB PAIN ON RIGHT SIDE: ICD-10-CM

## 2018-02-06 DIAGNOSIS — R07.81 RIB PAIN ON RIGHT SIDE: Primary | ICD-10-CM

## 2018-02-06 PROCEDURE — 71100 X-RAY EXAM RIBS UNI 2 VIEWS: CPT

## 2018-02-06 PROCEDURE — 99213 OFFICE O/P EST LOW 20 MIN: CPT | Performed by: NURSE PRACTITIONER

## 2018-02-06 ASSESSMENT — ENCOUNTER SYMPTOMS
CHEST TIGHTNESS: 0
SHORTNESS OF BREATH: 0
ABDOMINAL PAIN: 0
NAUSEA: 0
APNEA: 0
COLOR CHANGE: 0
EYES NEGATIVE: 1
COUGH: 0

## 2018-02-06 NOTE — PROGRESS NOTES
[address] [facility] E CARE  [facility]  [address] [place] [id number]  Dept: [phone]  Loc: [phone]    Alma Kwok is a 46 y.o. female who presents today for her medical conditions/complaints as noted below. 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 Denies chest pain, SOB. States it hurts over rib if she tries to breath deep. States she has taken ibuprofen and it feels better. Denies any other symptoms.      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[address]     Essential hypertension     Gastroesophageal reflux disease without esophagitis     Herpes simplex     Hyperlipidemia     Neck pain, chronic       Past Surgical History:   Procedure Laterality Date    BREAST SURGERY      CHOLECYSTECTOMY      HYSTERECTOMY, VAGINAL      one ovary remians       Family History   Problem Relation Age of Onset    Coronary Art Dis Mother     Diabetes Mother     High Blood Pressure Mother     Other Mother      thalassemia minor       Social History   Substance Use Topics    Smoking status: Never Smoker    Smokeless tobacco: Never Used    Alcohol use No      Current Outpatient Prescriptions   Medication Sig Dispense Refill    vitamin D (CHOLECALCIFEROL) 1000 UNIT TABS tablet Take 1,000 Units by mouth daily      Black Cohosh-SoyIsoflav-Magnol (ESTROVEN MENOPAUSE RELIEF PO) Take by mouth      atorvastatin (LIPITOR) 10 MG tablet TK 1 T PO QD  3    acyclovir (ZOVIRAX) 800 MG tablet TK 1 T PO D  2    AMRIX 15 MG extended release capsule TAKE 1 CAPSULE BY MOUTH EVERY DAY AT BEDTIME  5    albuterol sulfate HFA (PROAIR HFA) 108 (90 Base) MCG/ACT inhaler Inhale 2 puffs into time. Vital signs are normal. She appears well-developed and well-nourished. Non-toxic appearance. She does not have a sickly appearance. She does not appear ill. No distress. HENT:   Head: Normocephalic and atraumatic. Right Ear: Hearing, tympanic membrane, external ear and ear canal normal.   Left Ear: Hearing, tympanic membrane, external ear and ear canal normal.   Nose: Nose normal.   Mouth/Throat: Uvula is midline, oropharynx is clear and moist and mucous membranes are normal.   Eyes: Conjunctivae are normal. Pupils are equal, round, and reactive to light. Neck: Trachea normal and normal range of motion. Carotid bruit is not present. No thyroid mass present. Cardiovascular: Normal rate, regular rhythm and normal heart sounds. Pulmonary/Chest: Effort normal and breath sounds normal. No accessory muscle usage. No respiratory distress. She exhibits no tenderness, no edema and no swelling. Tenderness with palpation right 5th rib   Abdominal: Soft. Normal appearance and bowel sounds are normal. There is no tenderness. There is no guarding. Musculoskeletal: Normal range of motion. Neurological: She is alert and oriented to person, place, and time. She has normal strength. She displays a negative Romberg sign. Skin: Skin is warm and intact. No abrasion, no bruising and no rash noted. No erythema. Psychiatric: She has a normal mood and affect. Her speech is normal and behavior is normal. Judgment and thought content normal. Cognition and memory are normal.   Nursing note and vitals reviewed. BP (!) 153/82   Pulse 82   Temp 97.4 °F (36.3 °C) (Oral)   Resp 18   Ht 5' 4\" (1.626 m)   Wt 175 lb (79.4 kg)   SpO2 98%   BMI 30.04 kg/m²     Assessment:      1.  Rib pain on right side  XR RIBS RIGHT (2 VIEWS)       Plan:      Orders Placed This Encounter   Procedures    XR RIBS RIGHT (2 VIEWS)     Standing Status:   Future     Number of Occurrences:   1     Standing Expiration Date:   2/6/2019

## 2018-02-08 ENCOUNTER — PATIENT MESSAGE (OUTPATIENT)
Dept: INTERNAL MEDICINE | Age: 53
End: 2018-02-08

## 2018-03-14 ENCOUNTER — PATIENT MESSAGE (OUTPATIENT)
Dept: INTERNAL MEDICINE | Age: 53
End: 2018-03-14

## 2018-03-14 RX ORDER — PREDNISONE 10 MG/1
10 TABLET ORAL 2 TIMES DAILY
Qty: 8 TABLET | Refills: 0 | Status: SHIPPED | OUTPATIENT
Start: 2018-03-14 | End: 2018-03-18

## 2018-03-14 RX ORDER — CEFDINIR 300 MG/1
300 CAPSULE ORAL 2 TIMES DAILY
Qty: 14 CAPSULE | Refills: 0 | Status: SHIPPED | OUTPATIENT
Start: 2018-03-14 | End: 2018-03-21

## 2018-03-15 ENCOUNTER — OFFICE VISIT (OUTPATIENT)
Dept: VASCULAR SURGERY | Age: 53
End: 2018-03-15
Payer: COMMERCIAL

## 2018-03-15 VITALS
SYSTOLIC BLOOD PRESSURE: 141 MMHG | DIASTOLIC BLOOD PRESSURE: 93 MMHG | HEART RATE: 101 BPM | RESPIRATION RATE: 18 BRPM | TEMPERATURE: 97.6 F

## 2018-03-15 DIAGNOSIS — M79.605 LEG PAIN, LEFT: Primary | ICD-10-CM

## 2018-03-15 DIAGNOSIS — I78.1 SPIDER VEINS: ICD-10-CM

## 2018-03-15 DIAGNOSIS — I83.93 VARICOSE VEINS OF LEGS: ICD-10-CM

## 2018-03-15 DIAGNOSIS — M79.89 LEG SWELLING: ICD-10-CM

## 2018-03-15 DIAGNOSIS — M79.604 LEG PAIN, RIGHT: ICD-10-CM

## 2018-03-15 PROCEDURE — 99203 OFFICE O/P NEW LOW 30 MIN: CPT | Performed by: NURSE PRACTITIONER

## 2018-04-12 PROBLEM — Z12.12 SCREENING FOR COLORECTAL CANCER: Status: RESOLVED | Noted: 2017-11-07 | Resolved: 2018-04-12

## 2018-04-12 PROBLEM — Z12.11 SCREENING FOR COLORECTAL CANCER: Status: RESOLVED | Noted: 2017-11-07 | Resolved: 2018-04-12

## 2018-04-12 PROBLEM — Z00.00 ANNUAL PHYSICAL EXAM: Status: RESOLVED | Noted: 2017-11-07 | Resolved: 2018-04-12

## 2018-05-25 DIAGNOSIS — R73.01 IFG (IMPAIRED FASTING GLUCOSE): ICD-10-CM

## 2018-05-25 DIAGNOSIS — I10 ESSENTIAL HYPERTENSION: ICD-10-CM

## 2018-05-25 DIAGNOSIS — E78.00 PURE HYPERCHOLESTEROLEMIA: ICD-10-CM

## 2018-05-25 LAB
ALBUMIN SERPL-MCNC: 4.5 G/DL (ref 3.5–5.2)
ALP BLD-CCNC: 95 U/L (ref 35–104)
ALT SERPL-CCNC: 38 U/L (ref 5–33)
ANION GAP SERPL CALCULATED.3IONS-SCNC: 12 MMOL/L (ref 7–19)
AST SERPL-CCNC: 24 U/L (ref 5–32)
BASOPHILS ABSOLUTE: 0 K/UL (ref 0–0.2)
BASOPHILS RELATIVE PERCENT: 0.5 % (ref 0–1)
BILIRUB SERPL-MCNC: 1 MG/DL (ref 0.2–1.2)
BUN BLDV-MCNC: 20 MG/DL (ref 6–20)
CALCIUM SERPL-MCNC: 9.8 MG/DL (ref 8.6–10)
CHLORIDE BLD-SCNC: 105 MMOL/L (ref 98–111)
CHOLESTEROL, TOTAL: 194 MG/DL (ref 160–199)
CO2: 26 MMOL/L (ref 22–29)
CREAT SERPL-MCNC: 0.6 MG/DL (ref 0.5–0.9)
EOSINOPHILS ABSOLUTE: 0.1 K/UL (ref 0–0.6)
EOSINOPHILS RELATIVE PERCENT: 2.1 % (ref 0–5)
GFR NON-AFRICAN AMERICAN: >60
GLUCOSE BLD-MCNC: 93 MG/DL (ref 74–109)
HBA1C MFR BLD: 5.4 %
HCT VFR BLD CALC: 43.5 % (ref 37–47)
HDLC SERPL-MCNC: 71 MG/DL (ref 65–121)
HEMOGLOBIN: 13.1 G/DL (ref 12–16)
LDL CHOLESTEROL CALCULATED: 107 MG/DL
LYMPHOCYTES ABSOLUTE: 2.2 K/UL (ref 1.1–4.5)
LYMPHOCYTES RELATIVE PERCENT: 38.3 % (ref 20–40)
MCH RBC QN AUTO: 21.3 PG (ref 27–31)
MCHC RBC AUTO-ENTMCNC: 30.1 G/DL (ref 33–37)
MCV RBC AUTO: 70.8 FL (ref 81–99)
MONOCYTES ABSOLUTE: 0.5 K/UL (ref 0–0.9)
MONOCYTES RELATIVE PERCENT: 9 % (ref 0–10)
NEUTROPHILS ABSOLUTE: 2.8 K/UL (ref 1.5–7.5)
NEUTROPHILS RELATIVE PERCENT: 49.7 % (ref 50–65)
PDW BLD-RTO: 17 % (ref 11.5–14.5)
PLATELET # BLD: 269 K/UL (ref 130–400)
PMV BLD AUTO: 10.5 FL (ref 9.4–12.3)
POTASSIUM SERPL-SCNC: 4.5 MMOL/L (ref 3.5–5)
RBC # BLD: 6.14 M/UL (ref 4.2–5.4)
SODIUM BLD-SCNC: 143 MMOL/L (ref 136–145)
TOTAL PROTEIN: 7.4 G/DL (ref 6.6–8.7)
TRIGL SERPL-MCNC: 82 MG/DL (ref 0–149)
WBC # BLD: 5.7 K/UL (ref 4.8–10.8)

## 2018-06-01 ENCOUNTER — OFFICE VISIT (OUTPATIENT)
Dept: INTERNAL MEDICINE | Age: 53
End: 2018-06-01
Payer: COMMERCIAL

## 2018-06-01 VITALS
RESPIRATION RATE: 18 BRPM | BODY MASS INDEX: 29.19 KG/M2 | HEIGHT: 64 IN | SYSTOLIC BLOOD PRESSURE: 110 MMHG | WEIGHT: 171 LBS | DIASTOLIC BLOOD PRESSURE: 78 MMHG | HEART RATE: 85 BPM | OXYGEN SATURATION: 97 %

## 2018-06-01 DIAGNOSIS — R73.01 IFG (IMPAIRED FASTING GLUCOSE): ICD-10-CM

## 2018-06-01 DIAGNOSIS — Q21.12 PFO (PATENT FORAMEN OVALE): ICD-10-CM

## 2018-06-01 DIAGNOSIS — Z00.00 ANNUAL PHYSICAL EXAM: ICD-10-CM

## 2018-06-01 DIAGNOSIS — I10 ESSENTIAL HYPERTENSION: Primary | ICD-10-CM

## 2018-06-01 DIAGNOSIS — R74.01 ELEVATED TRANSAMINASE LEVEL: ICD-10-CM

## 2018-06-01 DIAGNOSIS — E78.00 PURE HYPERCHOLESTEROLEMIA: ICD-10-CM

## 2018-06-01 PROCEDURE — 99214 OFFICE O/P EST MOD 30 MIN: CPT | Performed by: INTERNAL MEDICINE

## 2018-06-01 RX ORDER — ATORVASTATIN CALCIUM 10 MG/1
TABLET, FILM COATED ORAL
Qty: 90 TABLET | Refills: 1 | Status: SHIPPED | OUTPATIENT
Start: 2018-06-01 | End: 2018-12-03 | Stop reason: SDUPTHER

## 2018-06-01 ASSESSMENT — ENCOUNTER SYMPTOMS
WHEEZING: 0
CHEST TIGHTNESS: 0
ABDOMINAL PAIN: 0
COUGH: 0
SORE THROAT: 0
CONSTIPATION: 0

## 2018-06-18 ENCOUNTER — HOSPITAL ENCOUNTER (OUTPATIENT)
Dept: VASCULAR LAB | Age: 53
Discharge: HOME | End: 2018-06-18
Payer: COMMERCIAL

## 2018-06-18 DIAGNOSIS — M79.605 LEG PAIN, LEFT: ICD-10-CM

## 2018-06-18 DIAGNOSIS — M79.604 LEG PAIN, RIGHT: ICD-10-CM

## 2018-06-18 DIAGNOSIS — M79.89 LEG SWELLING: ICD-10-CM

## 2018-06-18 DIAGNOSIS — I78.1 SPIDER VEINS: ICD-10-CM

## 2018-06-18 DIAGNOSIS — I83.93 VARICOSE VEINS OF LEGS: ICD-10-CM

## 2018-06-18 PROCEDURE — 93970 EXTREMITY STUDY: CPT

## 2018-06-26 ENCOUNTER — OFFICE VISIT (OUTPATIENT)
Dept: VASCULAR SURGERY | Age: 53
End: 2018-06-26
Payer: COMMERCIAL

## 2018-06-26 VITALS
SYSTOLIC BLOOD PRESSURE: 127 MMHG | HEART RATE: 84 BPM | DIASTOLIC BLOOD PRESSURE: 83 MMHG | TEMPERATURE: 96.8 F | RESPIRATION RATE: 18 BRPM

## 2018-06-26 DIAGNOSIS — I78.1 SPIDER VEINS: ICD-10-CM

## 2018-06-26 DIAGNOSIS — I87.2 VENOUS INSUFFICIENCY: ICD-10-CM

## 2018-06-26 PROCEDURE — 99212 OFFICE O/P EST SF 10 MIN: CPT | Performed by: NURSE PRACTITIONER

## 2018-10-08 ENCOUNTER — PATIENT MESSAGE (OUTPATIENT)
Dept: INTERNAL MEDICINE | Age: 53
End: 2018-10-08

## 2018-10-08 ENCOUNTER — TRANSCRIBE ORDERS (OUTPATIENT)
Dept: ADMINISTRATIVE | Facility: HOSPITAL | Age: 53
End: 2018-10-08

## 2018-10-08 ENCOUNTER — APPOINTMENT (OUTPATIENT)
Dept: LAB | Facility: HOSPITAL | Age: 53
End: 2018-10-08
Attending: OBSTETRICS & GYNECOLOGY

## 2018-10-08 DIAGNOSIS — E11.9 DIABETES MELLITUS WITHOUT COMPLICATION (HCC): Primary | ICD-10-CM

## 2018-10-08 DIAGNOSIS — E03.9 HYPOTHYROIDISM, UNSPECIFIED TYPE: ICD-10-CM

## 2018-10-08 DIAGNOSIS — Z78.0 MENOPAUSE: ICD-10-CM

## 2018-10-08 LAB
ESTRADIOL SERPL HS-MCNC: 17.3 PG/ML
FSH SERPL-ACNC: 61.1 MIU/ML
HBA1C MFR BLD: 5.3 %
LH SERPL-ACNC: 39.8 MIU/ML
T4 FREE SERPL-MCNC: 0.92 NG/DL (ref 0.78–2.19)
TSH SERPL DL<=0.05 MIU/L-ACNC: 1.26 MIU/ML (ref 0.47–4.68)

## 2018-10-08 PROCEDURE — 84443 ASSAY THYROID STIM HORMONE: CPT | Performed by: OBSTETRICS & GYNECOLOGY

## 2018-10-08 PROCEDURE — 82670 ASSAY OF TOTAL ESTRADIOL: CPT | Performed by: OBSTETRICS & GYNECOLOGY

## 2018-10-08 PROCEDURE — 83036 HEMOGLOBIN GLYCOSYLATED A1C: CPT | Performed by: OBSTETRICS & GYNECOLOGY

## 2018-10-08 PROCEDURE — 83002 ASSAY OF GONADOTROPIN (LH): CPT | Performed by: OBSTETRICS & GYNECOLOGY

## 2018-10-08 PROCEDURE — 84439 ASSAY OF FREE THYROXINE: CPT | Performed by: OBSTETRICS & GYNECOLOGY

## 2018-10-08 PROCEDURE — 83001 ASSAY OF GONADOTROPIN (FSH): CPT | Performed by: OBSTETRICS & GYNECOLOGY

## 2018-10-08 PROCEDURE — G0123 SCREEN CERV/VAG THIN LAYER: HCPCS | Performed by: OBSTETRICS & GYNECOLOGY

## 2018-10-08 PROCEDURE — 36415 COLL VENOUS BLD VENIPUNCTURE: CPT | Performed by: OBSTETRICS & GYNECOLOGY

## 2018-10-08 NOTE — TELEPHONE ENCOUNTER
From: Kathia Hyman  To: Kiesha Santillan MD  Sent: 10/8/2018 8:50 AM CDT  Subject: Prescription Question    Good Morning,  I would like to request a prescription for low dose of Lexapro. I've taken this in the past, during some difficult times. This should be reflected in my chart. I'm having menopausal symptoms which is making me extremely emotional. I'm crying on a daily basis. I'm having difficulty sleeping and have just learned a family member has lung cancer. Do I need to come in for a visit? I'm scheduled to come in for my semi-annual visit in November but I don't feel that I can wait until then to get some assistance. Thank you in advance for your assistance.

## 2018-10-09 ENCOUNTER — LAB REQUISITION (OUTPATIENT)
Dept: LAB | Facility: HOSPITAL | Age: 53
End: 2018-10-09

## 2018-10-09 DIAGNOSIS — Z12.72 ENCOUNTER FOR SCREENING FOR MALIGNANT NEOPLASM OF VAGINA: ICD-10-CM

## 2018-10-09 LAB
GEN CATEG CVX/VAG CYTO-IMP: NORMAL
LAB AP CASE REPORT: NORMAL
LAB AP GYN ADDITIONAL INFORMATION: NORMAL
PATH INTERP SPEC-IMP: NORMAL
STAT OF ADQ CVX/VAG CYTO-IMP: NORMAL

## 2018-11-28 DIAGNOSIS — R73.01 IFG (IMPAIRED FASTING GLUCOSE): ICD-10-CM

## 2018-11-28 DIAGNOSIS — Z00.00 ANNUAL PHYSICAL EXAM: ICD-10-CM

## 2018-11-28 LAB
ALBUMIN SERPL-MCNC: 3.9 G/DL (ref 3.5–5.2)
ALP BLD-CCNC: 80 U/L (ref 35–104)
ALT SERPL-CCNC: 24 U/L (ref 5–33)
ANION GAP SERPL CALCULATED.3IONS-SCNC: 12 MMOL/L (ref 7–19)
AST SERPL-CCNC: 23 U/L (ref 5–32)
BASOPHILS ABSOLUTE: 0 K/UL (ref 0–0.2)
BASOPHILS RELATIVE PERCENT: 0.4 % (ref 0–1)
BILIRUB SERPL-MCNC: 0.7 MG/DL (ref 0.2–1.2)
BILIRUBIN URINE: NEGATIVE
BLOOD, URINE: NEGATIVE
BUN BLDV-MCNC: 15 MG/DL (ref 6–20)
CALCIUM SERPL-MCNC: 8.9 MG/DL (ref 8.6–10)
CHLORIDE BLD-SCNC: 105 MMOL/L (ref 98–111)
CHOLESTEROL, TOTAL: 194 MG/DL (ref 160–199)
CLARITY: CLEAR
CO2: 24 MMOL/L (ref 22–29)
COLOR: YELLOW
CREAT SERPL-MCNC: 0.5 MG/DL (ref 0.5–0.9)
EOSINOPHILS ABSOLUTE: 0.1 K/UL (ref 0–0.6)
EOSINOPHILS RELATIVE PERCENT: 2.7 % (ref 0–5)
GFR NON-AFRICAN AMERICAN: >60
GLUCOSE BLD-MCNC: 87 MG/DL (ref 74–109)
GLUCOSE URINE: NEGATIVE MG/DL
HBA1C MFR BLD: 5.1 % (ref 4–6)
HCT VFR BLD CALC: 40.3 % (ref 37–47)
HDLC SERPL-MCNC: 74 MG/DL (ref 65–121)
HEMOGLOBIN: 12.4 G/DL (ref 12–16)
KETONES, URINE: ABNORMAL MG/DL
LDL CHOLESTEROL CALCULATED: 98 MG/DL
LEUKOCYTE ESTERASE, URINE: NEGATIVE
LYMPHOCYTES ABSOLUTE: 1.7 K/UL (ref 1.1–4.5)
LYMPHOCYTES RELATIVE PERCENT: 35.5 % (ref 20–40)
MCH RBC QN AUTO: 21.8 PG (ref 27–31)
MCHC RBC AUTO-ENTMCNC: 30.8 G/DL (ref 33–37)
MCV RBC AUTO: 70.8 FL (ref 81–99)
MONOCYTES ABSOLUTE: 0.4 K/UL (ref 0–0.9)
MONOCYTES RELATIVE PERCENT: 9.1 % (ref 0–10)
NEUTROPHILS ABSOLUTE: 2.5 K/UL (ref 1.5–7.5)
NEUTROPHILS RELATIVE PERCENT: 52.1 % (ref 50–65)
NITRITE, URINE: NEGATIVE
PDW BLD-RTO: 15.7 % (ref 11.5–14.5)
PH UA: 6
PLATELET # BLD: 281 K/UL (ref 130–400)
PMV BLD AUTO: 11.1 FL (ref 9.4–12.3)
POTASSIUM SERPL-SCNC: 4.2 MMOL/L (ref 3.5–5)
PROTEIN UA: NEGATIVE MG/DL
RBC # BLD: 5.69 M/UL (ref 4.2–5.4)
SODIUM BLD-SCNC: 141 MMOL/L (ref 136–145)
SPECIFIC GRAVITY UA: 1.02
TOTAL PROTEIN: 7.1 G/DL (ref 6.6–8.7)
TRIGL SERPL-MCNC: 110 MG/DL (ref 0–149)
TSH SERPL DL<=0.05 MIU/L-ACNC: 2.14 UIU/ML (ref 0.27–4.2)
UROBILINOGEN, URINE: 0.2 E.U./DL
WBC # BLD: 4.9 K/UL (ref 4.8–10.8)

## 2018-12-03 ENCOUNTER — OFFICE VISIT (OUTPATIENT)
Dept: INTERNAL MEDICINE | Age: 53
End: 2018-12-03
Payer: COMMERCIAL

## 2018-12-03 VITALS
HEART RATE: 80 BPM | RESPIRATION RATE: 18 BRPM | OXYGEN SATURATION: 98 % | SYSTOLIC BLOOD PRESSURE: 128 MMHG | HEIGHT: 64 IN | DIASTOLIC BLOOD PRESSURE: 80 MMHG | WEIGHT: 172 LBS | BODY MASS INDEX: 29.37 KG/M2

## 2018-12-03 DIAGNOSIS — E78.00 PURE HYPERCHOLESTEROLEMIA: ICD-10-CM

## 2018-12-03 DIAGNOSIS — R10.11 RIGHT UPPER QUADRANT ABDOMINAL PAIN: ICD-10-CM

## 2018-12-03 DIAGNOSIS — Z00.00 ANNUAL PHYSICAL EXAM: Primary | ICD-10-CM

## 2018-12-03 DIAGNOSIS — M85.852 OSTEOPENIA OF LEFT THIGH: ICD-10-CM

## 2018-12-03 DIAGNOSIS — I10 ESSENTIAL HYPERTENSION: ICD-10-CM

## 2018-12-03 DIAGNOSIS — R73.01 IFG (IMPAIRED FASTING GLUCOSE): ICD-10-CM

## 2018-12-03 PROCEDURE — 99396 PREV VISIT EST AGE 40-64: CPT | Performed by: INTERNAL MEDICINE

## 2018-12-03 RX ORDER — CYCLOBENZAPRINE HCL 10 MG
10 TABLET ORAL NIGHTLY PRN
COMMUNITY
End: 2019-06-03 | Stop reason: SDUPTHER

## 2018-12-03 RX ORDER — ATORVASTATIN CALCIUM 10 MG/1
TABLET, FILM COATED ORAL
Qty: 90 TABLET | Refills: 1 | Status: SHIPPED | OUTPATIENT
Start: 2018-12-03 | End: 2019-06-03 | Stop reason: SDUPTHER

## 2018-12-03 RX ORDER — MONTELUKAST SODIUM 4 MG/1
TABLET, CHEWABLE ORAL
Qty: 30 TABLET | Refills: 3 | Status: SHIPPED | OUTPATIENT
Start: 2018-12-03 | End: 2019-06-03 | Stop reason: CLARIF

## 2018-12-03 ASSESSMENT — PATIENT HEALTH QUESTIONNAIRE - PHQ9
SUM OF ALL RESPONSES TO PHQ QUESTIONS 1-9: 0
SUM OF ALL RESPONSES TO PHQ QUESTIONS 1-9: 0
2. FEELING DOWN, DEPRESSED OR HOPELESS: 0
SUM OF ALL RESPONSES TO PHQ9 QUESTIONS 1 & 2: 0
1. LITTLE INTEREST OR PLEASURE IN DOING THINGS: 0

## 2018-12-03 ASSESSMENT — ENCOUNTER SYMPTOMS
COUGH: 0
SINUS PRESSURE: 0
EYE PAIN: 0
TROUBLE SWALLOWING: 0
VOICE CHANGE: 0
DIARRHEA: 1
COLOR CHANGE: 0
WHEEZING: 0
NAUSEA: 0
BLOOD IN STOOL: 0
CONSTIPATION: 0
CHEST TIGHTNESS: 0
VOMITING: 0
SORE THROAT: 0
EYE REDNESS: 0
ABDOMINAL PAIN: 1

## 2018-12-03 NOTE — PROGRESS NOTES
swallowing and voice change. Eyes: Negative for pain, redness and visual disturbance. Respiratory: Negative for cough, chest tightness and wheezing. Cardiovascular: Negative for chest pain, palpitations and leg swelling. Gastrointestinal: Positive for abdominal pain and diarrhea. Negative for blood in stool, constipation, nausea and vomiting. Endocrine: Negative for polydipsia and polyuria. Genitourinary: Negative for dysuria, enuresis, flank pain, frequency and urgency. Musculoskeletal: Negative for arthralgias, gait problem and joint swelling. Skin: Negative for color change and rash. Neurological: Negative for dizziness, tremors, syncope, facial asymmetry, speech difficulty, weakness, numbness and headaches. Psychiatric/Behavioral: Negative for agitation, behavioral problems, confusion, sleep disturbance and suicidal ideas. The patient is not nervous/anxious. Vitals:    12/03/18 0836   BP: 128/80   Site: Left Upper Arm   Position: Sitting   Cuff Size: Large Adult   Pulse: 80   Resp: 18   SpO2: 98%   Weight: 172 lb (78 kg)   Height: 5' 4\" (1.626 m)      Wt Readings from Last 3 Encounters:   12/03/18 172 lb (78 kg)   06/01/18 171 lb (77.6 kg)   02/06/18 175 lb (79.4 kg)   Body mass index is 29.52 kg/m². BP Readings from Last 3 Encounters:   12/03/18 128/80   06/26/18 127/83   06/01/18 110/78       Physical Exam   Constitutional: She is oriented to person, place, and time. She appears well-developed and well-nourished. HENT:   Head: Normocephalic and atraumatic. Right Ear: External ear normal.   Left Ear: External ear normal.   Mouth/Throat: Oropharynx is clear and moist.   Eyes: Pupils are equal, round, and reactive to light. Conjunctivae are normal. No scleral icterus. Neck: Normal range of motion. Neck supple. No JVD present. No thyromegaly present. Cardiovascular: Normal rate, regular rhythm and normal heart sounds. No murmur heard.   Pulmonary/Chest: Effort normal and

## 2018-12-18 ENCOUNTER — HOSPITAL ENCOUNTER (OUTPATIENT)
Dept: ULTRASOUND IMAGING | Age: 53
Discharge: HOME | End: 2018-12-18
Payer: COMMERCIAL

## 2018-12-18 DIAGNOSIS — R10.11 RIGHT UPPER QUADRANT ABDOMINAL PAIN: ICD-10-CM

## 2018-12-18 PROCEDURE — 76700 US EXAM ABDOM COMPLETE: CPT

## 2019-01-02 PROBLEM — Z00.00 ANNUAL PHYSICAL EXAM: Status: RESOLVED | Noted: 2017-11-07 | Resolved: 2019-01-02

## 2019-01-14 ENCOUNTER — TELEPHONE (OUTPATIENT)
Dept: VASCULAR SURGERY | Age: 54
End: 2019-01-14

## 2019-02-12 ENCOUNTER — HOSPITAL ENCOUNTER (OUTPATIENT)
Dept: WOMENS IMAGING | Age: 54
Discharge: HOME | End: 2019-02-12
Payer: COMMERCIAL

## 2019-02-12 DIAGNOSIS — Z12.39 BREAST CANCER SCREENING: ICD-10-CM

## 2019-02-12 PROCEDURE — 77067 SCR MAMMO BI INCL CAD: CPT

## 2019-03-07 ENCOUNTER — OFFICE VISIT (OUTPATIENT)
Dept: CARDIOLOGY | Facility: CLINIC | Age: 54
End: 2019-03-07

## 2019-03-07 VITALS
WEIGHT: 176 LBS | HEART RATE: 76 BPM | SYSTOLIC BLOOD PRESSURE: 141 MMHG | HEIGHT: 65 IN | DIASTOLIC BLOOD PRESSURE: 90 MMHG | BODY MASS INDEX: 29.32 KG/M2

## 2019-03-07 DIAGNOSIS — I25.3 PFO WITH ATRIAL SEPTAL ANEURYSM: Primary | ICD-10-CM

## 2019-03-07 DIAGNOSIS — R00.2 PALPITATIONS: ICD-10-CM

## 2019-03-07 DIAGNOSIS — R03.0 ELEVATED BLOOD PRESSURE READING IN OFFICE WITHOUT DIAGNOSIS OF HYPERTENSION: ICD-10-CM

## 2019-03-07 DIAGNOSIS — Q21.12 PFO WITH ATRIAL SEPTAL ANEURYSM: Primary | ICD-10-CM

## 2019-03-07 PROCEDURE — 99214 OFFICE O/P EST MOD 30 MIN: CPT | Performed by: INTERNAL MEDICINE

## 2019-03-07 PROCEDURE — 93000 ELECTROCARDIOGRAM COMPLETE: CPT | Performed by: INTERNAL MEDICINE

## 2019-03-07 NOTE — PROGRESS NOTES
Subjective:     Encounter Date:03/07/2019      Patient ID: Marichuy Mosqueda is a 53 y.o. female with a history of a patent foramen ovale and interatrial septal aneurysm, hyperlipidemia, intermittent palpitations who presents for follow-up.    Chief Complaint: Follow-up    Palpitations    This is a recurrent problem. The problem occurs rarely. The problem has been unchanged. Nothing aggravates the symptoms. Pertinent negatives include no chest pain, dizziness, near-syncope or shortness of breath. She has tried nothing for the symptoms. There is no history of a valve disorder.     This patient presents today for routine follow-up.  She says she has been doing well.  She says that she is not experience any significant palpitations at this time.  She has not had any strokelike symptoms.  She denies lightheadedness, dizziness, syncope.  She notes that in general her blood pressure is well controlled.  She says that a few years ago, she was on medications for blood pressure, however with weight loss, she has been maintaining a good blood pressure without the aid of any medications at this time.  She says that her cholesterol has been well controlled.  She has not had any trouble with any of her medications.  She denies chest pain, orthopnea, PND, edema, significant shortness of breath or dyspnea on exertion.      Current Outpatient Medications:   •  acyclovir (ZOVIRAX) 200 MG capsule, Take  by mouth Every 4 (Four) Hours While Awake., Disp: , Rfl:   •  AMRIX 15 MG 24 hr capsule, , Disp: , Rfl: 5  •  aspirin 81 MG EC tablet, Take 81 mg by mouth Daily., Disp: , Rfl:   •  atorvastatin (LIPITOR) 10 MG tablet, TK 1 T PO QD, Disp: , Rfl: 3  •  cetirizine (zyrTEC) 10 MG tablet, Take 10 mg by mouth Daily., Disp: , Rfl:   •  estrogens, conjugated, (PREMARIN) 1.25 MG tablet, Take 1.25 mg by mouth Daily., Disp: , Rfl:   •  metaxalone (SKELAXIN) 800 MG tablet, TK 1/2 T PO Q 12 H PRN, Disp: , Rfl: 5  •  Multiple Vitamin (MULTI  VITAMIN PO), Take  by mouth Daily., Disp: , Rfl:   •  pantoprazole (PROTONIX) 40 MG EC tablet, Take 40 mg by mouth Daily., Disp: , Rfl:   •  Sod Picosulfate-Mag Ox-Cit Acd (PREPOPIK) 10-3.5-12 MG-GM-GM pack, Take as directed, Disp: 1 each, Rfl: 0    Allergies   Allergen Reactions   • Azithromycin Other (See Comments)     Chest pain   • Lortab [Hydrocodone-Acetaminophen] Itching   • Morphine Hives     Social History     Tobacco Use   • Smoking status: Never Smoker   • Smokeless tobacco: Never Used   Substance Use Topics   • Alcohol use: Yes     Comment: Socially     Review of Systems   Cardiovascular: Positive for palpitations. Negative for chest pain and near-syncope.   Respiratory: Negative for shortness of breath.    Neurological: Negative for dizziness.         ECG 12 Lead  Date/Time: 3/7/2019 11:58 AM  Performed by: Julio Tipton MD  Authorized by: Julio Tipton MD   Comparison: compared with previous ECG from 1/18/2018  Similar to previous ECG  Rhythm: sinus rhythm  Rate: normal  BPM: 76  Conduction: left bundle branch block  QRS axis: normal    Clinical impression: abnormal EKG               Objective:     Physical Exam   Constitutional: She is oriented to person, place, and time. She appears well-developed and well-nourished. No distress.   HENT:   Head: Normocephalic and atraumatic.   Mouth/Throat: Oropharynx is clear and moist.   Eyes: EOM are normal. Pupils are equal, round, and reactive to light.   Neck: Normal range of motion. Neck supple. No JVD present. No thyromegaly present.   Cardiovascular: Normal rate, regular rhythm, S1 normal, S2 normal, normal heart sounds and intact distal pulses. Exam reveals no gallop and no friction rub.   No murmur heard.  Pulmonary/Chest: Effort normal and breath sounds normal.   Abdominal: Soft. Bowel sounds are normal. She exhibits no distension. There is no tenderness.   Musculoskeletal: Normal range of motion. She exhibits no edema.   Neurological:  "She is alert and oriented to person, place, and time. No cranial nerve deficit.   Skin: Skin is warm and dry. No rash noted. No cyanosis or erythema. Nails show no clubbing.   Psychiatric: She has a normal mood and affect.   Vitals reviewed.    /90 (BP Location: Right arm, Patient Position: Sitting)   Pulse 76   Ht 163.8 cm (64.5\")   Wt 79.8 kg (176 lb)   BMI 29.74 kg/m²     Data/lab Review:     Lipid panel on 11/28/18: LDL 98, HDL 74, triglycerides 110    Holter monitor on 1/18/18:  · A relatively benign monitor study.  · The predominant rhythm noted during the testing period was sinus rhythm.  · Average HR: 78. Min HR: 50. Max HR: 119.  · Premature atrial contractions occured occasionally, all as isolated PAC's.  · Premature ventricular contractions did not appear during monitoring.    Echocardiogram on 1/25/18:  · Left ventricular systolic function is normal. Estimated EF appears to be in the range of 56 - 60%.  · Septal wall motion is abnormal, consistent with a bundle branch block.  · Left ventricular wall thickness is consistent with moderate concentric hypertrophy.  · No significant valvular abnormalities.      Assessment:          Diagnosis Plan   1. PFO with atrial septal aneurysm     2. Palpitations  ECG 12 Lead   3. Elevated blood pressure reading in office without diagnosis of hypertension          Plan:       1.  Patent foramen ovale: This patient does have a PFO but has never experienced any strokelike symptoms.  There is no indication to close her PFO at this time without any previous strokelike symptoms.  We will continue to follow her, however.    2.  Palpitations: Patient describes rare palpitations at this time.  I did ask her to call or return if symptoms worsen.  Previously, we investigated with a Holter monitor which showed occasional isolated PACs.    3.  Elevated blood pressure reading without diagnosis of hypertension: Patient's blood pressure is mildly elevated today but this is " unusual for her.  I have asked her to continue to monitor blood pressure closely.    Patient's Body mass index is 29.74 kg/m². BMI is above normal parameters. Recommendations include: exercise counseling and nutrition counseling.    Follow-up: 12 months unless needed sooner.

## 2019-03-08 ENCOUNTER — TELEPHONE (OUTPATIENT)
Dept: VASCULAR SURGERY | Age: 54
End: 2019-03-08

## 2019-05-18 ENCOUNTER — OFFICE VISIT (OUTPATIENT)
Dept: URGENT CARE | Age: 54
End: 2019-05-18
Payer: COMMERCIAL

## 2019-05-18 VITALS
HEART RATE: 76 BPM | SYSTOLIC BLOOD PRESSURE: 138 MMHG | BODY MASS INDEX: 30.05 KG/M2 | DIASTOLIC BLOOD PRESSURE: 84 MMHG | TEMPERATURE: 97.5 F | HEIGHT: 64 IN | RESPIRATION RATE: 18 BRPM | OXYGEN SATURATION: 99 % | WEIGHT: 176 LBS

## 2019-05-18 DIAGNOSIS — N30.01 ACUTE CYSTITIS WITH HEMATURIA: Primary | ICD-10-CM

## 2019-05-18 DIAGNOSIS — R35.0 URINARY FREQUENCY: ICD-10-CM

## 2019-05-18 LAB
APPEARANCE FLUID: ABNORMAL
BILIRUBIN, POC: NEGATIVE
BLOOD URINE, POC: ABNORMAL
CLARITY, POC: ABNORMAL
COLOR, POC: ABNORMAL
GLUCOSE URINE, POC: NEGATIVE
KETONES, POC: NEGATIVE
LEUKOCYTE EST, POC: NEGATIVE
NITRITE, POC: NEGATIVE
PH, POC: 5.5
PROTEIN, POC: 100
SPECIFIC GRAVITY, POC: 1.03
UROBILINOGEN, POC: 0.2

## 2019-05-18 PROCEDURE — 81002 URINALYSIS NONAUTO W/O SCOPE: CPT | Performed by: NURSE PRACTITIONER

## 2019-05-18 PROCEDURE — 99213 OFFICE O/P EST LOW 20 MIN: CPT | Performed by: NURSE PRACTITIONER

## 2019-05-18 RX ORDER — NITROFURANTOIN 25; 75 MG/1; MG/1
100 CAPSULE ORAL 2 TIMES DAILY
Qty: 10 CAPSULE | Refills: 0 | Status: SHIPPED | OUTPATIENT
Start: 2019-05-18 | End: 2019-05-28

## 2019-05-18 NOTE — PATIENT INSTRUCTIONS
Take full course of Macrobid  Avoid dark drinks  Increase water intake  F/U with PCP after completing the Macrobid  Patient Education        Urinary Tract Infection in Women: Care Instructions  Your Care Instructions    A urinary tract infection, or UTI, is a general term for an infection anywhere between the kidneys and the urethra (where urine comes out). Most UTIs are bladder infections. They often cause pain or burning when you urinate. UTIs are caused by bacteria and can be cured with antibiotics. Be sure to complete your treatment so that the infection goes away. Follow-up care is a key part of your treatment and safety. Be sure to make and go to all appointments, and call your doctor if you are having problems. It's also a good idea to know your test results and keep a list of the medicines you take. How can you care for yourself at home? · Take your antibiotics as directed. Do not stop taking them just because you feel better. You need to take the full course of antibiotics. · Drink extra water and other fluids for the next day or two. This may help wash out the bacteria that are causing the infection. (If you have kidney, heart, or liver disease and have to limit fluids, talk with your doctor before you increase your fluid intake.)  · Avoid drinks that are carbonated or have caffeine. They can irritate the bladder. · Urinate often. Try to empty your bladder each time. · To relieve pain, take a hot bath or lay a heating pad set on low over your lower belly or genital area. Never go to sleep with a heating pad in place. To prevent UTIs  · Drink plenty of water each day. This helps you urinate often, which clears bacteria from your system. (If you have kidney, heart, or liver disease and have to limit fluids, talk with your doctor before you increase your fluid intake.)  · Urinate when you need to. · Urinate right after you have sex. · Change sanitary pads often.   · Avoid douches, bubble baths,

## 2019-05-18 NOTE — PROGRESS NOTES
Take 3 mLs by nebulization every 6 hours as needed for Wheezing 120 each 1    aspirin 81 MG tablet Take 81 mg by mouth daily      Cetirizine HCl (ZYRTEC PO) Take 10 mg by mouth daily       Multiple Vitamins-Minerals (CERTA-MARY WITH MINERALS ORAL) solution Take 15 mLs by mouth daily      colestipol (COLESTID) 1 g tablet TAKE ONE DAILY 30 tablet 3    metaxalone (SKELAXIN) 800 MG tablet TK 1/2 T PO Q 12 H PRN       No current facility-administered medications for this visit. Allergies   Allergen Reactions    Azithromycin Other (See Comments)     Chest pain/irruglar heat beat        Lortab [Hydrocodone-Acetaminophen]     Meperidine Other (See Comments)     unsure    Morphine Hives       Health Maintenance   Topic Date Due    Hepatitis C screen  1965    HIV screen  10/21/1980    DTaP/Tdap/Td vaccine (1 - Tdap) 10/21/1984    Shingles Vaccine (1 of 2) 06/01/2019 (Originally 10/21/2015)    A1C test (Diabetic or Prediabetic)  11/28/2019    Potassium monitoring  11/28/2019    Creatinine monitoring  11/28/2019    Breast cancer screen  02/12/2021    Colon cancer screen colonoscopy  02/03/2022    Lipid screen  11/28/2023    Flu vaccine  Completed    Pneumococcal 0-64 years Vaccine  Aged Out       Subjective:      Review of Systems   Constitutional: Negative for fever. Gastrointestinal: Abdominal pain: lower. Genitourinary: Positive for dysuria and frequency. Objective:     Physical Exam   Constitutional: She appears well-developed and well-nourished. HENT:   Head: Normocephalic and atraumatic. Cardiovascular: Normal rate. Pulmonary/Chest: Effort normal.   Abdominal: There is tenderness (lower abd). Psychiatric: She has a normal mood and affect.  Her behavior is normal. Judgment and thought content normal.     /84   Pulse 76   Temp 97.5 °F (36.4 °C) (Oral)   Resp 18   Ht 5' 4\" (1.626 m)   Wt 176 lb (79.8 kg)   SpO2 99%   BMI 30.21 kg/m²     Assessment/ Plan

## 2019-05-20 LAB — URINE CULTURE, ROUTINE: NORMAL

## 2019-05-22 ENCOUNTER — TELEPHONE (OUTPATIENT)
Dept: URGENT CARE | Age: 54
End: 2019-05-22

## 2019-05-29 DIAGNOSIS — Z00.00 ANNUAL PHYSICAL EXAM: ICD-10-CM

## 2019-05-29 DIAGNOSIS — I10 ESSENTIAL HYPERTENSION: ICD-10-CM

## 2019-05-29 DIAGNOSIS — E78.00 PURE HYPERCHOLESTEROLEMIA: ICD-10-CM

## 2019-05-29 DIAGNOSIS — M85.852 OSTEOPENIA OF LEFT THIGH: ICD-10-CM

## 2019-05-29 DIAGNOSIS — R73.01 IFG (IMPAIRED FASTING GLUCOSE): ICD-10-CM

## 2019-05-29 LAB
ALBUMIN SERPL-MCNC: 4 G/DL (ref 3.5–5.2)
ALP BLD-CCNC: 69 U/L (ref 35–104)
ALT SERPL-CCNC: 20 U/L (ref 5–33)
ANION GAP SERPL CALCULATED.3IONS-SCNC: 13 MMOL/L (ref 7–19)
AST SERPL-CCNC: 18 U/L (ref 5–32)
BILIRUB SERPL-MCNC: 0.5 MG/DL (ref 0.2–1.2)
BUN BLDV-MCNC: 18 MG/DL (ref 6–20)
CALCIUM SERPL-MCNC: 8.9 MG/DL (ref 8.6–10)
CHLORIDE BLD-SCNC: 106 MMOL/L (ref 98–111)
CHOLESTEROL, TOTAL: 166 MG/DL (ref 160–199)
CO2: 24 MMOL/L (ref 22–29)
CREAT SERPL-MCNC: 0.5 MG/DL (ref 0.5–0.9)
GFR NON-AFRICAN AMERICAN: >60
GLUCOSE BLD-MCNC: 85 MG/DL (ref 74–109)
HBA1C MFR BLD: 5 % (ref 4–6)
HDLC SERPL-MCNC: 67 MG/DL (ref 65–121)
LDL CHOLESTEROL CALCULATED: 82 MG/DL
POTASSIUM SERPL-SCNC: 4 MMOL/L (ref 3.5–5)
SODIUM BLD-SCNC: 143 MMOL/L (ref 136–145)
TOTAL PROTEIN: 6.7 G/DL (ref 6.6–8.7)
TRIGL SERPL-MCNC: 83 MG/DL (ref 0–149)

## 2019-06-03 ENCOUNTER — OFFICE VISIT (OUTPATIENT)
Dept: INTERNAL MEDICINE | Age: 54
End: 2019-06-03
Payer: COMMERCIAL

## 2019-06-03 VITALS
SYSTOLIC BLOOD PRESSURE: 128 MMHG | BODY MASS INDEX: 28.66 KG/M2 | RESPIRATION RATE: 18 BRPM | WEIGHT: 172 LBS | OXYGEN SATURATION: 99 % | DIASTOLIC BLOOD PRESSURE: 78 MMHG | HEART RATE: 70 BPM | HEIGHT: 65 IN

## 2019-06-03 DIAGNOSIS — R10.2 SUPRAPUBIC PRESSURE: ICD-10-CM

## 2019-06-03 DIAGNOSIS — R31.29 MICROHEMATURIA: ICD-10-CM

## 2019-06-03 DIAGNOSIS — R74.01 ELEVATED TRANSAMINASE LEVEL: ICD-10-CM

## 2019-06-03 DIAGNOSIS — E78.00 PURE HYPERCHOLESTEROLEMIA: ICD-10-CM

## 2019-06-03 DIAGNOSIS — I87.2 VENOUS INSUFFICIENCY OF LEFT LEG: ICD-10-CM

## 2019-06-03 DIAGNOSIS — R73.01 IFG (IMPAIRED FASTING GLUCOSE): Primary | ICD-10-CM

## 2019-06-03 DIAGNOSIS — I10 ESSENTIAL HYPERTENSION: ICD-10-CM

## 2019-06-03 LAB
BILIRUBIN URINE: NEGATIVE
BLOOD, URINE: NEGATIVE
CLARITY: CLEAR
COLOR: YELLOW
GLUCOSE URINE: NEGATIVE MG/DL
KETONES, URINE: NEGATIVE MG/DL
LEUKOCYTE ESTERASE, URINE: NEGATIVE
NITRITE, URINE: NEGATIVE
PH UA: 6 (ref 5–8)
PROTEIN UA: NEGATIVE MG/DL
SPECIFIC GRAVITY UA: 1.03 (ref 1–1.03)
UROBILINOGEN, URINE: 0.2 E.U./DL

## 2019-06-03 PROCEDURE — 99214 OFFICE O/P EST MOD 30 MIN: CPT | Performed by: INTERNAL MEDICINE

## 2019-06-03 RX ORDER — HYDROCHLOROTHIAZIDE 12.5 MG/1
CAPSULE, GELATIN COATED ORAL
Qty: 30 CAPSULE | Refills: 0 | Status: SHIPPED | OUTPATIENT
Start: 2019-06-03 | End: 2019-12-18 | Stop reason: CLARIF

## 2019-06-03 RX ORDER — ATORVASTATIN CALCIUM 10 MG/1
TABLET, FILM COATED ORAL
Qty: 90 TABLET | Refills: 1 | Status: SHIPPED | OUTPATIENT
Start: 2019-06-03 | End: 2019-12-16 | Stop reason: SDUPTHER

## 2019-06-03 RX ORDER — CYCLOBENZAPRINE HCL 10 MG
10 TABLET ORAL NIGHTLY PRN
Qty: 90 TABLET | Refills: 1 | Status: SHIPPED | OUTPATIENT
Start: 2019-06-03 | End: 2020-12-28 | Stop reason: SDUPTHER

## 2019-06-03 ASSESSMENT — ENCOUNTER SYMPTOMS
WHEEZING: 0
ABDOMINAL PAIN: 0
CONSTIPATION: 0
CHEST TIGHTNESS: 0
COUGH: 0
SORE THROAT: 0

## 2019-06-03 NOTE — PROGRESS NOTES
Chief Complaint   Patient presents with    6 Month Follow-Up     History of presenting illness:  Kevin Booker is a51 y.o. female who presents today for follow up on her chronic medical conditions as noted below. Essential hypertension-Patient reports her Bp has been well controlled ( systolic below 479; diastolic below 90) at home when checked with home/ store equipment.  No side effects related to blood pressure medications were reported by patient     IFG (impaired fasting glucose)-she has been trying to decrease carb  intake     Pure hypercholesterolemia-takes Lipitor 10 mg daily     Suprapubic area pressure that has lasted since seen at urgent care few weeks ago  States that she does \"not urinate as much\"    States her left LE swells ( has had trauma to left ankle yrs ago) when up on feet  Has known DX venous insufficiency left le    Patient Active Problem List    Diagnosis Date Noted    Venous insufficiency 06/26/2018    Spider veins 06/26/2018    Osteopenia of left thigh 02/01/2018     Overview Note:     2/2018 hip neck -1.2; Lspine nl      Gastroesophageal reflux disease without esophagitis 11/07/2017    Essential hypertension 11/07/2017    IFG (impaired fasting glucose) 11/07/2017    Pure hypercholesterolemia 11/07/2017    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    Elevated transaminase level 11/07/2017    PFO (patent foramen ovale) 11/07/2017     Past Medical History:   Diagnosis Date    Acid reflux     Allergic rhinitis     Asthma     Asthma exacerbation 8/19/2017    Bronchitis     Chronic back pain     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[address]     Essential hypertension     Gastroesophageal reflux disease without esophagitis     Herpes simplex     Hyperlipidemia     Irregular heart rhythm     Neck pain, chronic       Past Surgical History:   Procedure Laterality Date    BREAST SURGERY      CHOLECYSTECTOMY      HYSTERECTOMY, VAGINAL      one ovary remians     Current Outpatient Medications   Medication Sig Dispense Refill    cyclobenzaprine (FLEXERIL) 10 MG tablet Take 1 tablet by mouth nightly as needed for Muscle spasms 90 tablet 1    atorvastatin (LIPITOR) 10 MG tablet TK 1 T PO QD 90 tablet 1    hydrochlorothiazide (MICROZIDE) 12.5 MG capsule Take one tablet once or twice weekly as needed for fluid retention 30 capsule 0    estrogens, conjugated, (PREMARIN) 1.25 MG tablet Take 1.25 mg by mouth daily      vitamin D (CHOLECALCIFEROL) 1000 UNIT TABS tablet Take 1,000 Units by mouth daily      metaxalone (SKELAXIN) 800 MG tablet TK 1/2 T PO Q 12 H PRN      acyclovir (ZOVIRAX) 800 MG tablet TK 1 T PO D  2    albuterol sulfate HFA (PROAIR HFA) 108 (90 Base) MCG/ACT inhaler Inhale 2 puffs into the lungs every 6 hours as needed for Wheezing 1 Inhaler 3    albuterol (PROVENTIL) (2.5 MG/3ML) 0.083% nebulizer solution Take 3 mLs by nebulization every 6 hours as needed for Wheezing 120 each 1    aspirin 81 MG tablet Take 81 mg by mouth daily      Cetirizine HCl (ZYRTEC PO) Take 10 mg by mouth daily       Multiple Vitamins-Minerals (CERTA-MARY WITH MINERALS ORAL) solution Take 15 mLs by mouth daily       No current facility-administered medications for this visit. Allergies   Allergen Reactions    Azithromycin Other (See Comments)     Chest pain/irruglar heat beat        Lortab [Hydrocodone-Acetaminophen]     Meperidine Other (See Comments)     unsure    Morphine Hives     Social History     Tobacco Use    Smoking status: Never Smoker    Smokeless tobacco: Never Used   Substance Use Topics    Alcohol use: No     Alcohol/week: 0.0 oz      Family History   Problem Relation Age of Onset    Coronary Art Dis Mother     Diabetes Mother     High Blood Pressure Mother     Other Mother         thalassemia minor       Review of Systems   Constitutional: Positive for fatigue. Negative for chills and fever.    HENT: Negative for congestion, ear pain, nosebleeds, postnasal drip and sore throat. Respiratory: Negative for cough, chest tightness and wheezing. Cardiovascular: Negative for chest pain, palpitations and leg swelling. Gastrointestinal: Negative for abdominal pain and constipation. Genitourinary: Negative for dysuria and urgency. Musculoskeletal: Positive for arthralgias. Skin: Negative for rash. Neurological: Negative for dizziness and headaches. Psychiatric/Behavioral: Negative. Vitals:    06/03/19 0831   BP: 128/78   Site: Left Upper Arm   Position: Sitting   Cuff Size: Large Adult   Pulse: 70   Resp: 18   SpO2: 99%   Weight: 172 lb (78 kg)   Height: 5' 4.5\" (1.638 m)     Body mass index is 29.07 kg/m². Physical Exam   Constitutional: She is oriented to person, place, and time. She appears well-developed and well-nourished. HENT:   Right Ear: External ear normal.   Left Ear: External ear normal.   Mouth/Throat: Oropharynx is clear and moist. No oropharyngeal exudate. Eyes: Pupils are equal, round, and reactive to light. Conjunctivae are normal.   Neck: Neck supple. No JVD present. No thyromegaly present. Cardiovascular: Normal rate and normal heart sounds. No murmur heard. Trace left ankle edema   Pulmonary/Chest: Breath sounds normal. No respiratory distress. She has no wheezes. She has no rales. She exhibits no tenderness. Abdominal: Soft. Bowel sounds are normal.   Musculoskeletal: Normal range of motion. Lymphadenopathy:     She has no cervical adenopathy. Neurological: She is oriented to person, place, and time. Skin: Skin is warm. No rash noted.        Lab Review   Orders Only on 05/29/2019   Component Date Value    Cholesterol, Total 05/29/2019 166     Triglycerides 05/29/2019 83     HDL 05/29/2019 67     LDL Calculated 05/29/2019 82     Hemoglobin A1C 05/29/2019 5.0     Sodium 05/29/2019 143     Potassium 05/29/2019 4.0     Chloride 05/29/2019 106     CO2 05/29/2019 24     Anion Gap 05/29/2019 13     Glucose 05/29/2019 85  BUN 05/29/2019 18     CREATININE 05/29/2019 0.5     GFR Non- 05/29/2019 >60     Calcium 05/29/2019 8.9     Total Protein 05/29/2019 6.7     Alb 05/29/2019 4.0     Total Bilirubin 05/29/2019 0.5     Alkaline Phosphatase 05/29/2019 69     ALT 05/29/2019 20     AST 05/29/2019 18    Office Visit on 05/18/2019   Component Date Value    Color, UA 05/18/2019 Dark Yellow     Clarity, UA 05/18/2019 Turbid     Glucose, UA POC 05/18/2019 Negative     Bilirubin, UA 05/18/2019 Negative     Ketones, UA 05/18/2019 Negative     Spec Grav, UA 05/18/2019 1.030     Blood, UA POC 05/18/2019 Moderate     pH, UA 05/18/2019 5.5     Protein, UA POC 05/18/2019 100     Urobilinogen, UA 05/18/2019 0.2     Leukocytes, UA 05/18/2019 Negative     Nitrite, UA 05/18/2019 Negative     Urine Culture, Routine 05/18/2019 <10,000 CFU/ml mixed skin/urogenital jaden. No further workup            ASSESSMENT/PLAN:    Essential hypertension-blood pressure has been well controlled-currently no prescription is needed     IFG (impaired fasting glucose)  Continue good diet efforts, A1c is now normal at 5.1( 5.2) ( 5.2)     Pure hypercholesterolemia-LDL is good range 82(98)(107), patient will continue Lipitor 10 mg daily     Previously E\elevated transaminase levels=NOW NL      PFO/prior history of TIA.  Pt follows dr Madeleine Bolanos       Left leg swelling- has known history of left lower extremity venous insufficiency  At times when spends more time on her feet it swells up  Prescription for hydrochlorothiazide 12.5 take 1-2 doses per week as needed for fluid retention  From gets worse patient needs to notify    Suprapubic pressure  Blood in ua 2 weeks ago  No micro done  Obtained repeat urinalysis today  Further plans once I review test results    ADDENDUM= UA now nl  Neg for blood      Orders Placed This Encounter   Procedures    Urine Culture    CBC Auto Differential    Comprehensive Metabolic Panel    Lipid Panel   

## 2019-06-05 LAB — URINE CULTURE, ROUTINE: NORMAL

## 2019-11-06 PROCEDURE — G0123 SCREEN CERV/VAG THIN LAYER: HCPCS | Performed by: OBSTETRICS & GYNECOLOGY

## 2019-11-07 ENCOUNTER — LAB REQUISITION (OUTPATIENT)
Dept: LAB | Facility: HOSPITAL | Age: 54
End: 2019-11-07

## 2019-11-07 DIAGNOSIS — Z12.72 ENCOUNTER FOR SCREENING FOR MALIGNANT NEOPLASM OF VAGINA: ICD-10-CM

## 2019-11-27 DIAGNOSIS — R10.2 SUPRAPUBIC PRESSURE: ICD-10-CM

## 2019-11-27 DIAGNOSIS — R74.01 ELEVATED TRANSAMINASE LEVEL: ICD-10-CM

## 2019-11-27 DIAGNOSIS — I10 ESSENTIAL HYPERTENSION: ICD-10-CM

## 2019-11-27 DIAGNOSIS — R31.29 MICROHEMATURIA: ICD-10-CM

## 2019-11-27 DIAGNOSIS — R73.01 IFG (IMPAIRED FASTING GLUCOSE): ICD-10-CM

## 2019-11-27 DIAGNOSIS — E78.00 PURE HYPERCHOLESTEROLEMIA: ICD-10-CM

## 2019-11-27 LAB
ALBUMIN SERPL-MCNC: 4.1 G/DL (ref 3.5–5.2)
ALP BLD-CCNC: 68 U/L (ref 35–104)
ALT SERPL-CCNC: 24 U/L (ref 5–33)
ANION GAP SERPL CALCULATED.3IONS-SCNC: 18 MMOL/L (ref 7–19)
AST SERPL-CCNC: 20 U/L (ref 5–32)
BASOPHILS ABSOLUTE: 0 K/UL (ref 0–0.2)
BASOPHILS RELATIVE PERCENT: 0.6 % (ref 0–1)
BILIRUB SERPL-MCNC: 0.7 MG/DL (ref 0.2–1.2)
BILIRUBIN URINE: NEGATIVE
BLOOD, URINE: NEGATIVE
BUN BLDV-MCNC: 21 MG/DL (ref 6–20)
CALCIUM SERPL-MCNC: 9.2 MG/DL (ref 8.6–10)
CHLORIDE BLD-SCNC: 105 MMOL/L (ref 98–111)
CHOLESTEROL, TOTAL: 189 MG/DL (ref 160–199)
CLARITY: CLEAR
CO2: 20 MMOL/L (ref 22–29)
COLOR: YELLOW
CREAT SERPL-MCNC: 0.5 MG/DL (ref 0.5–0.9)
EOSINOPHILS ABSOLUTE: 0.2 K/UL (ref 0–0.6)
EOSINOPHILS RELATIVE PERCENT: 3.1 % (ref 0–5)
GFR NON-AFRICAN AMERICAN: >60
GLUCOSE BLD-MCNC: 79 MG/DL (ref 74–109)
GLUCOSE URINE: NEGATIVE MG/DL
HCT VFR BLD CALC: 38.7 % (ref 37–47)
HDLC SERPL-MCNC: 71 MG/DL (ref 65–121)
HEMOGLOBIN: 12 G/DL (ref 12–16)
IMMATURE GRANULOCYTES #: 0 K/UL
KETONES, URINE: NEGATIVE MG/DL
LDL CHOLESTEROL CALCULATED: 95 MG/DL
LEUKOCYTE ESTERASE, URINE: NEGATIVE
LYMPHOCYTES ABSOLUTE: 2 K/UL (ref 1.1–4.5)
LYMPHOCYTES RELATIVE PERCENT: 39.9 % (ref 20–40)
MCH RBC QN AUTO: 21.8 PG (ref 27–31)
MCHC RBC AUTO-ENTMCNC: 31 G/DL (ref 33–37)
MCV RBC AUTO: 70.2 FL (ref 81–99)
MONOCYTES ABSOLUTE: 0.4 K/UL (ref 0–0.9)
MONOCYTES RELATIVE PERCENT: 7.6 % (ref 0–10)
NEUTROPHILS ABSOLUTE: 2.4 K/UL (ref 1.5–7.5)
NEUTROPHILS RELATIVE PERCENT: 48.6 % (ref 50–65)
NITRITE, URINE: NEGATIVE
PDW BLD-RTO: 14.6 % (ref 11.5–14.5)
PH UA: 6 (ref 5–8)
PLATELET # BLD: 284 K/UL (ref 130–400)
PMV BLD AUTO: 11.6 FL (ref 9.4–12.3)
POTASSIUM SERPL-SCNC: 4 MMOL/L (ref 3.5–5)
PROTEIN UA: NEGATIVE MG/DL
RBC # BLD: 5.51 M/UL (ref 4.2–5.4)
SODIUM BLD-SCNC: 143 MMOL/L (ref 136–145)
SPECIFIC GRAVITY UA: 1.03 (ref 1–1.03)
TOTAL PROTEIN: 6.9 G/DL (ref 6.6–8.7)
TRIGL SERPL-MCNC: 116 MG/DL (ref 0–149)
TSH SERPL DL<=0.05 MIU/L-ACNC: 2.16 UIU/ML (ref 0.27–4.2)
UROBILINOGEN, URINE: 0.2 E.U./DL
VITAMIN D 25-HYDROXY: 49.3 NG/ML
WBC # BLD: 4.9 K/UL (ref 4.8–10.8)

## 2019-12-16 RX ORDER — ATORVASTATIN CALCIUM 10 MG/1
TABLET, FILM COATED ORAL
Qty: 90 TABLET | Refills: 1 | Status: SHIPPED | OUTPATIENT
Start: 2019-12-16 | End: 2020-06-18 | Stop reason: SDUPTHER

## 2019-12-18 ENCOUNTER — OFFICE VISIT (OUTPATIENT)
Dept: INTERNAL MEDICINE | Age: 54
End: 2019-12-18
Payer: COMMERCIAL

## 2019-12-18 VITALS
BODY MASS INDEX: 28.32 KG/M2 | WEIGHT: 170 LBS | OXYGEN SATURATION: 97 % | RESPIRATION RATE: 18 BRPM | HEART RATE: 68 BPM | SYSTOLIC BLOOD PRESSURE: 120 MMHG | HEIGHT: 65 IN | DIASTOLIC BLOOD PRESSURE: 76 MMHG

## 2019-12-18 DIAGNOSIS — E78.00 PURE HYPERCHOLESTEROLEMIA: ICD-10-CM

## 2019-12-18 DIAGNOSIS — R73.01 IFG (IMPAIRED FASTING GLUCOSE): ICD-10-CM

## 2019-12-18 DIAGNOSIS — Z11.4 SCREENING FOR HIV (HUMAN IMMUNODEFICIENCY VIRUS): ICD-10-CM

## 2019-12-18 DIAGNOSIS — M85.852 OSTEOPENIA OF LEFT THIGH: ICD-10-CM

## 2019-12-18 DIAGNOSIS — Z12.31 ENCOUNTER FOR SCREENING MAMMOGRAM FOR BREAST CANCER: ICD-10-CM

## 2019-12-18 DIAGNOSIS — Z11.59 NEED FOR HEPATITIS C SCREENING TEST: Primary | ICD-10-CM

## 2019-12-18 DIAGNOSIS — I10 ESSENTIAL HYPERTENSION: ICD-10-CM

## 2019-12-18 DIAGNOSIS — E55.9 VITAMIN D DEFICIENCY: ICD-10-CM

## 2019-12-18 PROCEDURE — 99396 PREV VISIT EST AGE 40-64: CPT | Performed by: INTERNAL MEDICINE

## 2019-12-18 RX ORDER — LISINOPRIL AND HYDROCHLOROTHIAZIDE 12.5; 1 MG/1; MG/1
1 TABLET ORAL DAILY
Qty: 90 TABLET | Refills: 1 | Status: SHIPPED | OUTPATIENT
Start: 2019-12-18 | End: 2020-12-28 | Stop reason: SDUPTHER

## 2019-12-18 RX ORDER — LISINOPRIL AND HYDROCHLOROTHIAZIDE 12.5; 1 MG/1; MG/1
1 TABLET ORAL DAILY
Qty: 90 TABLET | Refills: 1 | OUTPATIENT
Start: 2019-12-18 | End: 2019-12-18 | Stop reason: SDUPTHER

## 2019-12-18 RX ORDER — LISINOPRIL AND HYDROCHLOROTHIAZIDE 12.5; 1 MG/1; MG/1
1 TABLET ORAL DAILY
COMMUNITY
End: 2019-12-18 | Stop reason: SDUPTHER

## 2019-12-18 RX ORDER — METAXALONE 800 MG/1
TABLET ORAL
Qty: 30 TABLET | Refills: 2 | Status: SHIPPED | OUTPATIENT
Start: 2019-12-18 | End: 2020-12-28 | Stop reason: SDUPTHER

## 2019-12-18 ASSESSMENT — ENCOUNTER SYMPTOMS
VOICE CHANGE: 0
SINUS PRESSURE: 0
CHEST TIGHTNESS: 0
CONSTIPATION: 0
VOMITING: 0
SORE THROAT: 0
NAUSEA: 0
DIARRHEA: 0
COUGH: 0
BLOOD IN STOOL: 0
TROUBLE SWALLOWING: 0
EYE REDNESS: 0
COLOR CHANGE: 0
ABDOMINAL PAIN: 0
WHEEZING: 0
EYE PAIN: 0

## 2020-02-13 ENCOUNTER — HOSPITAL ENCOUNTER (OUTPATIENT)
Dept: WOMENS IMAGING | Age: 55
Discharge: HOME | End: 2020-02-13
Payer: COMMERCIAL

## 2020-02-13 PROCEDURE — 77063 BREAST TOMOSYNTHESIS BI: CPT

## 2020-06-10 DIAGNOSIS — E78.00 PURE HYPERCHOLESTEROLEMIA: ICD-10-CM

## 2020-06-10 DIAGNOSIS — Z11.59 NEED FOR HEPATITIS C SCREENING TEST: ICD-10-CM

## 2020-06-10 DIAGNOSIS — E55.9 VITAMIN D DEFICIENCY: ICD-10-CM

## 2020-06-10 DIAGNOSIS — I10 ESSENTIAL HYPERTENSION: ICD-10-CM

## 2020-06-10 DIAGNOSIS — M85.852 OSTEOPENIA OF LEFT THIGH: ICD-10-CM

## 2020-06-10 DIAGNOSIS — R73.01 IFG (IMPAIRED FASTING GLUCOSE): ICD-10-CM

## 2020-06-10 DIAGNOSIS — Z12.31 ENCOUNTER FOR SCREENING MAMMOGRAM FOR BREAST CANCER: ICD-10-CM

## 2020-06-10 DIAGNOSIS — Z11.4 SCREENING FOR HIV (HUMAN IMMUNODEFICIENCY VIRUS): ICD-10-CM

## 2020-06-10 LAB
ALBUMIN SERPL-MCNC: 4.3 G/DL (ref 3.5–5.2)
ALP BLD-CCNC: 64 U/L (ref 35–104)
ALT SERPL-CCNC: 20 U/L (ref 5–33)
ANION GAP SERPL CALCULATED.3IONS-SCNC: 12 MMOL/L (ref 7–19)
AST SERPL-CCNC: 18 U/L (ref 5–32)
BILIRUB SERPL-MCNC: 0.5 MG/DL (ref 0.2–1.2)
BUN BLDV-MCNC: 21 MG/DL (ref 6–20)
CALCIUM SERPL-MCNC: 9.4 MG/DL (ref 8.6–10)
CHLORIDE BLD-SCNC: 102 MMOL/L (ref 98–111)
CHOLESTEROL, TOTAL: 182 MG/DL (ref 160–199)
CO2: 26 MMOL/L (ref 22–29)
CREAT SERPL-MCNC: 0.6 MG/DL (ref 0.5–0.9)
GFR NON-AFRICAN AMERICAN: >60
GLUCOSE BLD-MCNC: 81 MG/DL (ref 74–109)
HDLC SERPL-MCNC: 70 MG/DL (ref 65–121)
HEPATITIS C ANTIBODY INTERPRETATION: NORMAL
HIV-1 P24 AG: NORMAL
LDL CHOLESTEROL CALCULATED: 92 MG/DL
POTASSIUM SERPL-SCNC: 4.2 MMOL/L (ref 3.5–5)
RAPID HIV 1&2: NORMAL
SODIUM BLD-SCNC: 140 MMOL/L (ref 136–145)
TOTAL PROTEIN: 6.9 G/DL (ref 6.6–8.7)
TRIGL SERPL-MCNC: 100 MG/DL (ref 0–149)
VITAMIN D 25-HYDROXY: 52.5 NG/ML

## 2020-06-18 ENCOUNTER — OFFICE VISIT (OUTPATIENT)
Dept: INTERNAL MEDICINE | Age: 55
End: 2020-06-18
Payer: COMMERCIAL

## 2020-06-18 VITALS
RESPIRATION RATE: 18 BRPM | SYSTOLIC BLOOD PRESSURE: 110 MMHG | HEART RATE: 72 BPM | BODY MASS INDEX: 29.37 KG/M2 | OXYGEN SATURATION: 99 % | DIASTOLIC BLOOD PRESSURE: 70 MMHG | HEIGHT: 64 IN | WEIGHT: 172 LBS

## 2020-06-18 PROCEDURE — 99214 OFFICE O/P EST MOD 30 MIN: CPT | Performed by: INTERNAL MEDICINE

## 2020-06-18 RX ORDER — NICOTINE POLACRILEX 2 MG
LOZENGE BUCCAL
COMMUNITY

## 2020-06-18 RX ORDER — ATORVASTATIN CALCIUM 10 MG/1
TABLET, FILM COATED ORAL
Qty: 90 TABLET | Refills: 1 | Status: SHIPPED | OUTPATIENT
Start: 2020-06-18 | End: 2020-12-21 | Stop reason: SDUPTHER

## 2020-06-18 ASSESSMENT — ENCOUNTER SYMPTOMS
CONSTIPATION: 0
WHEEZING: 0
ABDOMINAL PAIN: 0
SORE THROAT: 0
COUGH: 0
CHEST TIGHTNESS: 0

## 2020-06-18 ASSESSMENT — PATIENT HEALTH QUESTIONNAIRE - PHQ9
2. FEELING DOWN, DEPRESSED OR HOPELESS: 0
SUM OF ALL RESPONSES TO PHQ9 QUESTIONS 1 & 2: 0
1. LITTLE INTEREST OR PLEASURE IN DOING THINGS: 0
SUM OF ALL RESPONSES TO PHQ QUESTIONS 1-9: 0
SUM OF ALL RESPONSES TO PHQ QUESTIONS 1-9: 0

## 2020-06-18 NOTE — PROGRESS NOTES
Chief Complaint   Patient presents with    6 Month Follow-Up     History of presenting illness:  Bao Walker is a50 y.o. female who presents today for follow up on her chronic medical conditions as noted below. Essential hypertension-Patient reports her Bp has been well controlled ( systolic below 625; diastolic below 90) at home when checked with home/ store equipment.  No side effects related to blood pressure medications were reported by patient  Recently lisinopril / hctz 10/12.5 was started     IFG (impaired fasting glucose)-she has been trying to decrease carb  intake     Pure hypercholesterolemia-takes Lipitor 10 mg daily    Patient Active Problem List    Diagnosis Date Noted    Venous insufficiency of left leg 06/03/2019    Venous insufficiency 06/26/2018    Spider veins 06/26/2018    Osteopenia of left thigh 02/01/2018     Overview Note:     2/2018 hip neck -1.2; Lspine nl      Gastroesophageal reflux disease without esophagitis 11/07/2017    Essential hypertension 11/07/2017    IFG (impaired fasting glucose) 11/07/2017    Pure hypercholesterolemia 11/07/2017    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    Elevated transaminase level 11/07/2017    PFO (patent foramen ovale) 11/07/2017     Past Medical History:   Diagnosis Date    Acid reflux     Allergic rhinitis     Asthma     Asthma exacerbation 8/19/2017    Bronchitis     Chronic back pain     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[address]     Essential hypertension     Gastroesophageal reflux disease without esophagitis     Herpes simplex     Hyperlipidemia     Irregular heart rhythm     Neck pain, chronic       Past Surgical History:   Procedure Laterality Date    BREAST SURGERY      CHOLECYSTECTOMY      HYSTERECTOMY, VAGINAL      one ovary remians     Current Outpatient Medications   Medication Sig Dispense Refill    Cyanocobalamin (B-12) 5000 MCG SUBL Place under the tongue      atorvastatin (LIPITOR) 10 MG tablet TAKE 1 TABLET BY

## 2020-09-21 RX ORDER — LISINOPRIL 10 MG/1
TABLET ORAL
Qty: 90 TABLET | Refills: 1 | Status: SHIPPED | OUTPATIENT
Start: 2020-09-21 | End: 2020-12-28 | Stop reason: CLARIF

## 2020-09-21 RX ORDER — HYDROCHLOROTHIAZIDE 12.5 MG/1
TABLET ORAL
Qty: 90 TABLET | Refills: 1 | Status: SHIPPED | OUTPATIENT
Start: 2020-09-21 | End: 2020-12-28 | Stop reason: CLARIF

## 2020-12-18 DIAGNOSIS — I10 ESSENTIAL HYPERTENSION: ICD-10-CM

## 2020-12-18 DIAGNOSIS — R73.01 IFG (IMPAIRED FASTING GLUCOSE): ICD-10-CM

## 2020-12-18 DIAGNOSIS — E55.9 VITAMIN D DEFICIENCY: ICD-10-CM

## 2020-12-18 DIAGNOSIS — E78.00 PURE HYPERCHOLESTEROLEMIA: ICD-10-CM

## 2020-12-18 LAB
ALBUMIN SERPL-MCNC: 4.4 G/DL (ref 3.5–5.2)
ALP BLD-CCNC: 89 U/L (ref 35–104)
ALT SERPL-CCNC: 25 U/L (ref 5–33)
ANION GAP SERPL CALCULATED.3IONS-SCNC: 13 MMOL/L (ref 7–19)
AST SERPL-CCNC: 18 U/L (ref 5–32)
BACTERIA: ABNORMAL /HPF
BASOPHILS ABSOLUTE: 0 K/UL (ref 0–0.2)
BASOPHILS RELATIVE PERCENT: 0.4 % (ref 0–1)
BILIRUB SERPL-MCNC: 0.5 MG/DL (ref 0.2–1.2)
BILIRUBIN URINE: NEGATIVE
BLOOD, URINE: NEGATIVE
BUN BLDV-MCNC: 21 MG/DL (ref 6–20)
CALCIUM SERPL-MCNC: 9.6 MG/DL (ref 8.6–10)
CHLORIDE BLD-SCNC: 101 MMOL/L (ref 98–111)
CHOLESTEROL, TOTAL: 222 MG/DL (ref 160–199)
CLARITY: CLEAR
CO2: 26 MMOL/L (ref 22–29)
COLOR: ABNORMAL
CREAT SERPL-MCNC: 0.5 MG/DL (ref 0.5–0.9)
CRYSTALS, UA: ABNORMAL /HPF
EOSINOPHILS ABSOLUTE: 0.2 K/UL (ref 0–0.6)
EOSINOPHILS RELATIVE PERCENT: 2.7 % (ref 0–5)
EPITHELIAL CELLS, UA: 5 /HPF (ref 0–5)
GFR AFRICAN AMERICAN: >59
GFR NON-AFRICAN AMERICAN: >60
GLUCOSE BLD-MCNC: 92 MG/DL (ref 74–109)
GLUCOSE URINE: NEGATIVE MG/DL
HBA1C MFR BLD: 5.3 % (ref 4–6)
HCT VFR BLD CALC: 42.1 % (ref 37–47)
HDLC SERPL-MCNC: 87 MG/DL (ref 65–121)
HEMOGLOBIN: 12.9 G/DL (ref 12–16)
HYALINE CASTS: 3 /HPF (ref 0–8)
IMMATURE GRANULOCYTES #: 0 K/UL
KETONES, URINE: NEGATIVE MG/DL
LDL CHOLESTEROL CALCULATED: 107 MG/DL
LEUKOCYTE ESTERASE, URINE: ABNORMAL
LYMPHOCYTES ABSOLUTE: 2.4 K/UL (ref 1.1–4.5)
LYMPHOCYTES RELATIVE PERCENT: 31.9 % (ref 20–40)
MCH RBC QN AUTO: 21.6 PG (ref 27–31)
MCHC RBC AUTO-ENTMCNC: 30.6 G/DL (ref 33–37)
MCV RBC AUTO: 70.4 FL (ref 81–99)
MONOCYTES ABSOLUTE: 0.6 K/UL (ref 0–0.9)
MONOCYTES RELATIVE PERCENT: 7.3 % (ref 0–10)
NEUTROPHILS ABSOLUTE: 4.3 K/UL (ref 1.5–7.5)
NEUTROPHILS RELATIVE PERCENT: 57.3 % (ref 50–65)
NITRITE, URINE: NEGATIVE
PDW BLD-RTO: 15.8 % (ref 11.5–14.5)
PH UA: 5.5 (ref 5–8)
PLATELET # BLD: 299 K/UL (ref 130–400)
PMV BLD AUTO: 10.8 FL (ref 9.4–12.3)
POTASSIUM SERPL-SCNC: 4 MMOL/L (ref 3.5–5)
PROTEIN UA: NEGATIVE MG/DL
RBC # BLD: 5.98 M/UL (ref 4.2–5.4)
RBC UA: 2 /HPF (ref 0–4)
SODIUM BLD-SCNC: 140 MMOL/L (ref 136–145)
SPECIFIC GRAVITY UA: 1.03 (ref 1–1.03)
TOTAL PROTEIN: 7.5 G/DL (ref 6.6–8.7)
TRIGL SERPL-MCNC: 139 MG/DL (ref 0–149)
TSH SERPL DL<=0.05 MIU/L-ACNC: 3.39 UIU/ML (ref 0.27–4.2)
UROBILINOGEN, URINE: 0.2 E.U./DL
VITAMIN D 25-HYDROXY: 35.8 NG/ML
WBC # BLD: 7.5 K/UL (ref 4.8–10.8)
WBC UA: 2 /HPF (ref 0–5)

## 2020-12-21 RX ORDER — ATORVASTATIN CALCIUM 10 MG/1
TABLET, FILM COATED ORAL
Qty: 90 TABLET | Refills: 1 | Status: SHIPPED | OUTPATIENT
Start: 2020-12-21 | End: 2021-06-21

## 2020-12-21 NOTE — TELEPHONE ENCOUNTER
Aria Russell called requesting a refill of the below medication which has been pended for you:     Requested Prescriptions     Pending Prescriptions Disp Refills    atorvastatin (LIPITOR) 10 MG tablet 90 tablet 1     Sig: TAKE 1 TABLET BY MOUTH EVERY DAY       Last Appointment Date: 6/18/2020  Next Appointment Date: 12/28/2020    Allergies   Allergen Reactions    Azithromycin Other (See Comments)     Chest pain/irruglar heat beat        Lortab [Hydrocodone-Acetaminophen]     Meperidine Other (See Comments)     unsure    Morphine Hives

## 2020-12-28 ENCOUNTER — OFFICE VISIT (OUTPATIENT)
Dept: INTERNAL MEDICINE | Age: 55
End: 2020-12-28
Payer: COMMERCIAL

## 2020-12-28 VITALS
OXYGEN SATURATION: 98 % | DIASTOLIC BLOOD PRESSURE: 70 MMHG | HEIGHT: 64 IN | BODY MASS INDEX: 29.88 KG/M2 | HEART RATE: 74 BPM | WEIGHT: 175 LBS | SYSTOLIC BLOOD PRESSURE: 120 MMHG | RESPIRATION RATE: 18 BRPM

## 2020-12-28 PROCEDURE — 99396 PREV VISIT EST AGE 40-64: CPT | Performed by: INTERNAL MEDICINE

## 2020-12-28 RX ORDER — LISINOPRIL AND HYDROCHLOROTHIAZIDE 12.5; 1 MG/1; MG/1
1 TABLET ORAL DAILY
Qty: 90 TABLET | Refills: 1 | Status: SHIPPED | OUTPATIENT
Start: 2020-12-28 | End: 2021-07-09

## 2020-12-28 RX ORDER — ALBUTEROL SULFATE 90 UG/1
2 AEROSOL, METERED RESPIRATORY (INHALATION) EVERY 6 HOURS PRN
Qty: 1 INHALER | Refills: 3 | Status: SHIPPED | OUTPATIENT
Start: 2020-12-28

## 2020-12-28 RX ORDER — CYCLOBENZAPRINE HCL 10 MG
10 TABLET ORAL NIGHTLY PRN
Qty: 90 TABLET | Refills: 1 | Status: SHIPPED | OUTPATIENT
Start: 2020-12-28

## 2020-12-28 RX ORDER — ALBUTEROL SULFATE 2.5 MG/3ML
2.5 SOLUTION RESPIRATORY (INHALATION) EVERY 6 HOURS PRN
Qty: 120 EACH | Refills: 1 | Status: SHIPPED | OUTPATIENT
Start: 2020-12-28

## 2020-12-28 RX ORDER — METAXALONE 800 MG/1
TABLET ORAL
Qty: 30 TABLET | Refills: 2 | Status: SHIPPED | OUTPATIENT
Start: 2020-12-28 | End: 2022-06-20 | Stop reason: SDUPTHER

## 2020-12-28 SDOH — ECONOMIC STABILITY: FOOD INSECURITY: WITHIN THE PAST 12 MONTHS, THE FOOD YOU BOUGHT JUST DIDN'T LAST AND YOU DIDN'T HAVE MONEY TO GET MORE.: PATIENT DECLINED

## 2020-12-28 SDOH — ECONOMIC STABILITY: TRANSPORTATION INSECURITY
IN THE PAST 12 MONTHS, HAS LACK OF TRANSPORTATION KEPT YOU FROM MEETINGS, WORK, OR FROM GETTING THINGS NEEDED FOR DAILY LIVING?: PATIENT DECLINED

## 2020-12-28 SDOH — ECONOMIC STABILITY: INCOME INSECURITY: HOW HARD IS IT FOR YOU TO PAY FOR THE VERY BASICS LIKE FOOD, HOUSING, MEDICAL CARE, AND HEATING?: PATIENT DECLINED

## 2020-12-28 SDOH — ECONOMIC STABILITY: TRANSPORTATION INSECURITY
IN THE PAST 12 MONTHS, HAS THE LACK OF TRANSPORTATION KEPT YOU FROM MEDICAL APPOINTMENTS OR FROM GETTING MEDICATIONS?: PATIENT DECLINED

## 2020-12-28 SDOH — ECONOMIC STABILITY: FOOD INSECURITY: WITHIN THE PAST 12 MONTHS, YOU WORRIED THAT YOUR FOOD WOULD RUN OUT BEFORE YOU GOT MONEY TO BUY MORE.: PATIENT DECLINED

## 2020-12-28 ASSESSMENT — ENCOUNTER SYMPTOMS
BLOOD IN STOOL: 0
EYE REDNESS: 0
SORE THROAT: 0
ABDOMINAL PAIN: 0
WHEEZING: 0
SINUS PRESSURE: 0
COUGH: 0
COLOR CHANGE: 0
DIARRHEA: 0
VOMITING: 0
NAUSEA: 0
CONSTIPATION: 0
TROUBLE SWALLOWING: 0
VOICE CHANGE: 0
EYE PAIN: 0
CHEST TIGHTNESS: 0

## 2020-12-28 NOTE — PROGRESS NOTES
Chief Complaint:   Osvaldo Chaney is a 54 y.o. female who presents forcomplete physical exam.    History of Present Illness:      Osvaldo Chaney is a 54 y.o. female who presents todayfor wellness visit AND follow up on her chronic medical conditions as noted below. Essential hypertension-Patient reports her Bp has been well controlled ( systolic below 732; diastolic below 90) at home when checked with home/ store equipment.  No side effects related to blood pressure medications were reported by patient  Recently lisinopril / hctz 10/12.5 was started     IFG (impaired fasting glucose)-she has been trying to decrease carb  intake     Pure hypercholesterolemia-takes Lipitor 10 mg daily    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    Elevated transaminase level 11/07/2017    PFO (patent foramen ovale) 11/07/2017       Past Medical History:   Diagnosis Date    Acid reflux     Allergic rhinitis     Asthma     Asthma exacerbation 8/19/2017    Bronchitis     Chronic back pain     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[address]     Essential hypertension     Gastroesophageal reflux disease without esophagitis     Herpes simplex     Hyperlipidemia     Irregular heart rhythm     Neck pain, chronic        Past Surgical History:   Procedure Laterality Date    BREAST SURGERY      CHOLECYSTECTOMY      HYSTERECTOMY, VAGINAL      one ovary remians       Current Outpatient Medications   Medication Sig Dispense Refill  albuterol (PROVENTIL) (2.5 MG/3ML) 0.083% nebulizer solution Take 3 mLs by nebulization every 6 hours as needed for Wheezing 120 each 1    albuterol sulfate HFA (PROAIR HFA) 108 (90 Base) MCG/ACT inhaler Inhale 2 puffs into the lungs every 6 hours as needed for Wheezing 1 Inhaler 3    cyclobenzaprine (FLEXERIL) 10 MG tablet Take 1 tablet by mouth nightly as needed for Muscle spasms 90 tablet 1    lisinopril-hydroCHLOROthiazide (PRINZIDE;ZESTORETIC) 10-12.5 MG per tablet Take 1 tablet by mouth daily 90 tablet 1    metaxalone (SKELAXIN) 800 MG tablet TK 1/2 T PO Q 12 H PRN 30 tablet 2    atorvastatin (LIPITOR) 10 MG tablet TAKE 1 TABLET BY MOUTH EVERY DAY 90 tablet 1    Cyanocobalamin (B-12) 5000 MCG SUBL Place under the tongue      estrogens, conjugated, (PREMARIN) 1.25 MG tablet Take 1.25 mg by mouth daily      vitamin D (CHOLECALCIFEROL) 1000 UNIT TABS tablet Take 2,000 Units by mouth daily       acyclovir (ZOVIRAX) 800 MG tablet TK 1 T PO D  2    aspirin 81 MG tablet Take 81 mg by mouth daily      Cetirizine HCl (ZYRTEC PO) Take 10 mg by mouth daily       Multiple Vitamins-Minerals (CERTA-MARY WITH MINERALS ORAL) solution Take 15 mLs by mouth daily       No current facility-administered medications for this visit.       Allergies   Allergen Reactions    Azithromycin Other (See Comments)     Chest pain/irruglar heat beat        Lortab [Hydrocodone-Acetaminophen]     Meperidine Other (See Comments)     unsure    Morphine Hives       Social History     Socioeconomic History    Marital status:      Spouse name: None    Number of children: None    Years of education: None    Highest education level: None   Occupational History    None   Social Needs    Financial resource strain: Patient refused    Food insecurity     Worry: Patient refused     Inability: Patient refused    Transportation needs     Medical: Patient refused     Non-medical: Patient refused   Tobacco Use  Smoking status: Never Smoker    Smokeless tobacco: Never Used   Substance and Sexual Activity    Alcohol use: No     Alcohol/week: 0.0 standard drinks    Drug use: No    Sexual activity: None   Lifestyle    Physical activity     Days per week: None     Minutes per session: None    Stress: None   Relationships    Social connections     Talks on phone: None     Gets together: None     Attends Mandaeism service: None     Active member of club or organization: None     Attends meetings of clubs or organizations: None     Relationship status: None    Intimate partner violence     Fear of current or ex partner: None     Emotionally abused: None     Physically abused: None     Forced sexual activity: None   Other Topics Concern    None   Social History Narrative    None     Family History   Problem Relation Age of Onset    Coronary Art Dis Mother     Diabetes Mother     High Blood Pressure Mother     Other Mother         thalassemia minor          Past Surgical History:   Procedure Laterality Date    BREAST SURGERY      CHOLECYSTECTOMY      HYSTERECTOMY, VAGINAL      one ovary remians         Lab Review   Orders Only on 12/18/2020   Component Date Value    Vit D, 25-Hydroxy 12/18/2020 35.8     TSH 12/18/2020 3.390     Color, UA 12/18/2020 DARK YELLOW*    Clarity, UA 12/18/2020 Clear     Glucose, Ur 12/18/2020 Negative     Bilirubin Urine 12/18/2020 Negative     Ketones, Urine 12/18/2020 Negative     Specific Gravity, UA 12/18/2020 1.031     Blood, Urine 12/18/2020 Negative     pH, UA 12/18/2020 5.5     Protein, UA 12/18/2020 Negative     Urobilinogen, Urine 12/18/2020 0.2     Nitrite, Urine 12/18/2020 Negative     Leukocyte Esterase, Urine 12/18/2020 TRACE*    Cholesterol, Total 12/18/2020 222*    Triglycerides 12/18/2020 139     HDL 12/18/2020 87     LDL Calculated 12/18/2020 107     Hemoglobin A1C 12/18/2020 5.3     Sodium 12/18/2020 140     Potassium 12/18/2020 4.0  Chloride 12/18/2020 101     CO2 12/18/2020 26     Anion Gap 12/18/2020 13     Glucose 12/18/2020 92     BUN 12/18/2020 21*    CREATININE 12/18/2020 0.5     GFR Non- 12/18/2020 >60     GFR  12/18/2020 >59     Calcium 12/18/2020 9.6     Total Protein 12/18/2020 7.5     Alb 12/18/2020 4.4     Total Bilirubin 12/18/2020 0.5     Alkaline Phosphatase 12/18/2020 89     ALT 12/18/2020 25     AST 12/18/2020 18     WBC 12/18/2020 7.5     RBC 12/18/2020 5.98*    Hemoglobin 12/18/2020 12.9     Hematocrit 12/18/2020 42.1     MCV 12/18/2020 70.4*    MCH 12/18/2020 21.6*    MCHC 12/18/2020 30.6*    RDW 12/18/2020 15.8*    Platelets 10/70/2061 299     MPV 12/18/2020 10.8     Neutrophils % 12/18/2020 57.3     Lymphocytes % 12/18/2020 31.9     Monocytes % 12/18/2020 7.3     Eosinophils % 12/18/2020 2.7     Basophils % 12/18/2020 0.4     Neutrophils Absolute 12/18/2020 4.3     Immature Granulocytes # 12/18/2020 0.0     Lymphocytes Absolute 12/18/2020 2.4     Monocytes Absolute 12/18/2020 0.60     Eosinophils Absolute 12/18/2020 0.20     Basophils Absolute 12/18/2020 0.00     Bacteria, UA 12/18/2020 1+*    Crystals, UA 12/18/2020 NEG*    Hyaline Casts, UA 12/18/2020 3     WBC, UA 12/18/2020 2     RBC, UA 12/18/2020 2     Epithelial Cells, UA 12/18/2020 5          Review of Systems   Constitutional: Negative for chills, fatigue and fever. HENT: Negative for congestion, ear pain, postnasal drip, sinus pressure, sore throat, trouble swallowing and voice change. Eyes: Negative for pain, redness and visual disturbance. Respiratory: Negative for cough, chest tightness and wheezing. Cardiovascular: Negative for chest pain, palpitations and leg swelling. Gastrointestinal: Negative for abdominal pain, blood in stool, constipation, diarrhea, nausea and vomiting. Endocrine: Negative for polydipsia and polyuria. Genitourinary: Negative for dysuria, enuresis, flank pain, frequency and urgency. Musculoskeletal: Negative for arthralgias, gait problem and joint swelling. Skin: Negative for color change and rash. Neurological: Negative for dizziness, tremors, syncope, facial asymmetry, speech difficulty, weakness, numbness and headaches. Psychiatric/Behavioral: Negative for agitation, behavioral problems, confusion, sleep disturbance and suicidal ideas. The patient is not nervous/anxious. Vitals:    12/28/20 1037   BP: 120/70   Site: Left Upper Arm   Position: Sitting   Cuff Size: Large Adult   Pulse: 74   Resp: 18   SpO2: 98%   Weight: 175 lb (79.4 kg)   Height: 5' 4\" (1.626 m)      Wt Readings from Last 3 Encounters:   12/28/20 175 lb (79.4 kg)   06/18/20 172 lb (78 kg)   12/18/19 170 lb (77.1 kg)   Body mass index is 30.04 kg/m². BP Readings from Last 3 Encounters:   12/28/20 120/70   06/18/20 110/70   12/18/19 120/76       Physical Exam  Constitutional:       Appearance: She is well-developed. HENT:      Head: Normocephalic and atraumatic. Right Ear: External ear normal.      Left Ear: External ear normal.   Eyes:      General: No scleral icterus. Conjunctiva/sclera: Conjunctivae normal.      Pupils: Pupils are equal, round, and reactive to light. Neck:      Musculoskeletal: Normal range of motion and neck supple. Thyroid: No thyromegaly. Vascular: No JVD. Cardiovascular:      Rate and Rhythm: Normal rate and regular rhythm. Heart sounds: Normal heart sounds. No murmur. Pulmonary:      Effort: Pulmonary effort is normal. No respiratory distress. Breath sounds: Normal breath sounds. No wheezing. Chest:      Chest wall: No tenderness. Abdominal:      General: Bowel sounds are normal.      Palpations: Abdomen is soft. There is no mass. Tenderness: There is no abdominal tenderness. Musculoskeletal: Normal range of motion. General: No tenderness. Lymphadenopathy:      Cervical: No cervical adenopathy. Skin:     General: Skin is warm and dry. Findings: No erythema or rash. Neurological:      Mental Status: She is alert and oriented to person, place, and time. Cranial Nerves: No cranial nerve deficit. Coordination: Coordination normal.      Deep Tendon Reflexes: Reflexes are normal and symmetric. Psychiatric:         Behavior: Behavior normal.     Breast exam  Bilateral breast exam- symmetric, no nodules, no lymphadenopathy, no nipple discharge              ASSESSMENT/PLAN    Annual physical exam  *Screening for colorectal cancer 2017/5 yrs  * mammogram- schedule  * PAP 10/2019- now wants to have PAP here ( post hysterectomy - has rt ovary)  Needs pelvic exam 12/2022  * BD-repeat 2022 2/2018 hip neck -1.2; Lspine nl     Essential hypertension-  recently gyn office started lisinopril/ hctz  Cont 10/12.5     IFG (impaired fasting glucose)  Continue good diet efforts,   A1c is now normal at 5.3 in 12/2020 ( 5.1( 5.2) ( 5.2)  FBS 81     Pure hypercholesterolemia-  LDL is 107 in12/2020 ( 92 (95 )( 47)(68)(456),  RX Lipitor 10 mg daily     Vit D level 35 in 12/2020(52 ( 49)  Takes 2000 iu daily  Increase 400 for wintertime     Previously E\elevated transaminase levels=  NOW NL      PFO/prior history of TIA. Pt follows dr Zuri Larson    At times hoarse/ at times issues swallowing ( like foreigb bodyt sensation)  ENT eval recommneded  Pt wants to wait until seen her next spring/ summer  Does not want to make appt this time       Orders Placed This Encounter   Procedures    BHASKAR DIGITAL SCREEN W OR WO CAD BILATERAL    DEXA BONE DENSITY 2 SITES    CBC Auto Differential    Comprehensive Metabolic Panel    Hemoglobin A1C    Lipid Panel    Urinalysis    TSH without Reflex    Vitamin D 25 Hydroxy     New Prescriptions    No medications on file      There are no Patient Instructions on file for this visit. No follow-ups on file. EMR Dragon/transcription disclaimer:Significant part of this  encounter note is electronic transcription/translation of spoken language to printed text. The electronic translation of spoken language may beerroneous, or at times, nonsensical words or phrases may be inadvertently transcribed.  Although I have reviewed the note for such errors, some may still exist.

## 2021-02-02 ENCOUNTER — TELEPHONE (OUTPATIENT)
Dept: INTERNAL MEDICINE | Age: 56
End: 2021-02-02

## 2021-02-02 RX ORDER — ESTRADIOL 1 MG/1
1 TABLET ORAL DAILY
Qty: 90 TABLET | Refills: 3 | Status: SHIPPED | OUTPATIENT
Start: 2021-02-02 | End: 2022-01-18

## 2021-02-02 NOTE — TELEPHONE ENCOUNTER
----- Message from Parminder Truong MD sent at 10/2/2020 12:55 PM CDT -----  Please notify parent of negative test for COVID19 and to continue with plan as discussed. Note for school in chart.   ??  Cream  Tablets?   Mg?

## 2021-02-25 ENCOUNTER — HOSPITAL ENCOUNTER (OUTPATIENT)
Dept: WOMENS IMAGING | Age: 56
Discharge: HOME | End: 2021-02-25
Payer: COMMERCIAL

## 2021-02-25 DIAGNOSIS — Z12.31 ENCOUNTER FOR SCREENING MAMMOGRAM FOR BREAST CANCER: ICD-10-CM

## 2021-02-25 PROCEDURE — 77067 SCR MAMMO BI INCL CAD: CPT

## 2021-04-28 ENCOUNTER — PATIENT MESSAGE (OUTPATIENT)
Dept: INTERNAL MEDICINE | Age: 56
End: 2021-04-28

## 2021-04-28 RX ORDER — ACYCLOVIR 800 MG/1
TABLET ORAL
Qty: 90 TABLET | Refills: 2 | Status: SHIPPED | OUTPATIENT
Start: 2021-04-28 | End: 2021-12-27 | Stop reason: SDUPTHER

## 2021-04-28 NOTE — TELEPHONE ENCOUNTER
From: Martha Shanks  To: Anitha Champagne MD  Sent: 4/28/2021 3:08 PM CDT  Subject: Prescription Question    Hi,  When I was there in December, I told Dr. Yarelis Mosquera that my gynocologist, Dr. Avery Triplett, had retired. She will be prescibing the medications that I used to get from him. One of those medications is Acyclovir 800mg Tablets. I have just ran out of my refills from Dr. Avery Triplett. I thought you all had sent over a prescription to [facility] for this (when I was there in December) but I'm not seeing it. [facility] sent me a message saying they're trying to contact prescriber but they won't be able to reach him since he retired. Would you mind to send it over to them now? If you have questions, I can be reached at [phone].

## 2021-04-28 NOTE — TELEPHONE ENCOUNTER
Robin Marie called requesting a refill of the below medication which has been pended for you:     Requested Prescriptions     Pending Prescriptions Disp Refills    acyclovir (ZOVIRAX) 800 MG tablet 90 tablet 2     Sig: TK 1 T PO D       Last Appointment Date: 12/28/2020  Next Appointment Date: 6/24/2021    Allergies   Allergen Reactions    Azithromycin Other (See Comments)     Chest pain/irruglar heat beat        Lortab [Hydrocodone-Acetaminophen]     Meperidine Other (See Comments)     unsure    Morphine Hives

## 2021-06-18 DIAGNOSIS — Z12.31 ENCOUNTER FOR SCREENING MAMMOGRAM FOR BREAST CANCER: ICD-10-CM

## 2021-06-18 DIAGNOSIS — R79.89 TSH ELEVATION: ICD-10-CM

## 2021-06-18 DIAGNOSIS — E55.9 VITAMIN D DEFICIENCY: ICD-10-CM

## 2021-06-18 DIAGNOSIS — R73.01 IFG (IMPAIRED FASTING GLUCOSE): ICD-10-CM

## 2021-06-18 DIAGNOSIS — E78.00 PURE HYPERCHOLESTEROLEMIA: ICD-10-CM

## 2021-06-18 DIAGNOSIS — I10 ESSENTIAL HYPERTENSION: ICD-10-CM

## 2021-06-18 LAB
ALBUMIN SERPL-MCNC: 4.1 G/DL (ref 3.5–5.2)
ALP BLD-CCNC: 68 U/L (ref 35–104)
ALT SERPL-CCNC: 25 U/L (ref 5–33)
ANION GAP SERPL CALCULATED.3IONS-SCNC: 8 MMOL/L (ref 7–19)
AST SERPL-CCNC: 19 U/L (ref 5–32)
BASOPHILS ABSOLUTE: 0 K/UL (ref 0–0.2)
BASOPHILS RELATIVE PERCENT: 0.5 % (ref 0–1)
BILIRUB SERPL-MCNC: 0.5 MG/DL (ref 0.2–1.2)
BILIRUBIN URINE: NEGATIVE
BLOOD, URINE: NEGATIVE
BUN BLDV-MCNC: 24 MG/DL (ref 6–20)
CALCIUM SERPL-MCNC: 9.3 MG/DL (ref 8.6–10)
CHLORIDE BLD-SCNC: 104 MMOL/L (ref 98–111)
CHOLESTEROL, TOTAL: 166 MG/DL (ref 160–199)
CLARITY: CLEAR
CO2: 28 MMOL/L (ref 22–29)
COLOR: YELLOW
CREAT SERPL-MCNC: 0.6 MG/DL (ref 0.5–0.9)
EOSINOPHILS ABSOLUTE: 0.2 K/UL (ref 0–0.6)
EOSINOPHILS RELATIVE PERCENT: 2.8 % (ref 0–5)
GFR AFRICAN AMERICAN: >59
GFR NON-AFRICAN AMERICAN: >60
GLUCOSE BLD-MCNC: 79 MG/DL (ref 74–109)
GLUCOSE URINE: NEGATIVE MG/DL
HBA1C MFR BLD: 5 % (ref 4–6)
HCT VFR BLD CALC: 38 % (ref 37–47)
HDLC SERPL-MCNC: 64 MG/DL (ref 65–121)
HEMOGLOBIN: 11.8 G/DL (ref 12–16)
IMMATURE GRANULOCYTES #: 0 K/UL
KETONES, URINE: ABNORMAL MG/DL
LDL CHOLESTEROL CALCULATED: 88 MG/DL
LEUKOCYTE ESTERASE, URINE: NEGATIVE
LYMPHOCYTES ABSOLUTE: 2.1 K/UL (ref 1.1–4.5)
LYMPHOCYTES RELATIVE PERCENT: 37.7 % (ref 20–40)
MCH RBC QN AUTO: 22.1 PG (ref 27–31)
MCHC RBC AUTO-ENTMCNC: 31.1 G/DL (ref 33–37)
MCV RBC AUTO: 71.2 FL (ref 81–99)
MONOCYTES ABSOLUTE: 0.5 K/UL (ref 0–0.9)
MONOCYTES RELATIVE PERCENT: 8.3 % (ref 0–10)
NEUTROPHILS ABSOLUTE: 2.9 K/UL (ref 1.5–7.5)
NEUTROPHILS RELATIVE PERCENT: 50.5 % (ref 50–65)
NITRITE, URINE: NEGATIVE
PDW BLD-RTO: 16.9 % (ref 11.5–14.5)
PH UA: 5.5 (ref 5–8)
PLATELET # BLD: 274 K/UL (ref 130–400)
PMV BLD AUTO: 11.3 FL (ref 9.4–12.3)
POTASSIUM SERPL-SCNC: 4.1 MMOL/L (ref 3.5–5)
PROTEIN UA: NEGATIVE MG/DL
RBC # BLD: 5.34 M/UL (ref 4.2–5.4)
SODIUM BLD-SCNC: 140 MMOL/L (ref 136–145)
SPECIFIC GRAVITY UA: 1.03 (ref 1–1.03)
TOTAL PROTEIN: 6.9 G/DL (ref 6.6–8.7)
TRIGL SERPL-MCNC: 68 MG/DL (ref 0–149)
TSH SERPL DL<=0.05 MIU/L-ACNC: 2.22 UIU/ML (ref 0.27–4.2)
UROBILINOGEN, URINE: 1 E.U./DL
VITAMIN D 25-HYDROXY: 51.1 NG/ML
WBC # BLD: 5.7 K/UL (ref 4.8–10.8)

## 2021-06-21 RX ORDER — ATORVASTATIN CALCIUM 10 MG/1
TABLET, FILM COATED ORAL
Qty: 90 TABLET | Refills: 1 | Status: SHIPPED | OUTPATIENT
Start: 2021-06-21 | End: 2021-12-16

## 2021-06-24 ENCOUNTER — OFFICE VISIT (OUTPATIENT)
Dept: INTERNAL MEDICINE | Age: 56
End: 2021-06-24
Payer: COMMERCIAL

## 2021-06-24 VITALS
SYSTOLIC BLOOD PRESSURE: 120 MMHG | OXYGEN SATURATION: 98 % | HEART RATE: 71 BPM | DIASTOLIC BLOOD PRESSURE: 70 MMHG | HEIGHT: 64 IN | BODY MASS INDEX: 28.34 KG/M2 | RESPIRATION RATE: 18 BRPM | WEIGHT: 166 LBS

## 2021-06-24 DIAGNOSIS — E55.9 VITAMIN D DEFICIENCY: ICD-10-CM

## 2021-06-24 DIAGNOSIS — E78.00 PURE HYPERCHOLESTEROLEMIA: ICD-10-CM

## 2021-06-24 DIAGNOSIS — I10 ESSENTIAL HYPERTENSION: Primary | ICD-10-CM

## 2021-06-24 DIAGNOSIS — R73.01 IFG (IMPAIRED FASTING GLUCOSE): ICD-10-CM

## 2021-06-24 DIAGNOSIS — Z00.00 ANNUAL PHYSICAL EXAM: ICD-10-CM

## 2021-06-24 PROCEDURE — 99214 OFFICE O/P EST MOD 30 MIN: CPT | Performed by: INTERNAL MEDICINE

## 2021-06-24 ASSESSMENT — ENCOUNTER SYMPTOMS
SORE THROAT: 0
ABDOMINAL PAIN: 0
COUGH: 0
CHEST TIGHTNESS: 0
CONSTIPATION: 0
WHEEZING: 0

## 2021-06-24 NOTE — PROGRESS NOTES
Chief Complaint   Patient presents with    6 Month Follow-Up     History of presenting illness:  Maximino Patient is a52 y.o. female who presents today for follow up on her chronic medical conditions as noted below. Essential hypertension-Patient reports her Bp has been well controlled ( systolic below 996; diastolic below 90) at home when checked with home/ store equipment.  No side effects related to blood pressure medications were reported by patient  Recently lisinopril / hctz 10/12.5 was started     IFG (impaired fasting glucose)-she has been trying to decrease carb  intake     Pure hypercholesterolemia-takes Lipitor 10 mg daily     Patient Active Problem List    Diagnosis Date Noted    Venous insufficiency of left leg 06/03/2019    Venous insufficiency 06/26/2018    Spider veins 06/26/2018    Osteopenia of left thigh 02/01/2018     Overview Note:     2/2018 hip neck -1.2; Lspine nl      Gastroesophageal reflux disease without esophagitis 11/07/2017    Essential hypertension 11/07/2017    IFG (impaired fasting glucose) 11/07/2017    Pure hypercholesterolemia 11/07/2017    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    Elevated transaminase level 11/07/2017    PFO (patent foramen ovale) 11/07/2017     Past Medical History:   Diagnosis Date    Acid reflux     Allergic rhinitis     Asthma     Asthma exacerbation 8/19/2017    Bronchitis     Chronic back pain     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[address]     Essential hypertension     Gastroesophageal reflux disease without esophagitis     Herpes simplex     Hyperlipidemia     Irregular heart rhythm     Neck pain, chronic       Past Surgical History:   Procedure Laterality Date    BREAST SURGERY      CHOLECYSTECTOMY      HYSTERECTOMY, VAGINAL      one ovary remians     Current Outpatient Medications   Medication Sig Dispense Refill    atorvastatin (LIPITOR) 10 MG tablet TAKE 1 TABLET BY MOUTH EVERY DAY 90 tablet 1    acyclovir (ZOVIRAX) 800 MG tablet TK 1 T PO D 90 tablet 2    estradiol (ESTRACE) 1 MG tablet Take 1 tablet by mouth daily 90 tablet 3    albuterol (PROVENTIL) (2.5 MG/3ML) 0.083% nebulizer solution Take 3 mLs by nebulization every 6 hours as needed for Wheezing 120 each 1    albuterol sulfate HFA (PROAIR HFA) 108 (90 Base) MCG/ACT inhaler Inhale 2 puffs into the lungs every 6 hours as needed for Wheezing 1 Inhaler 3    cyclobenzaprine (FLEXERIL) 10 MG tablet Take 1 tablet by mouth nightly as needed for Muscle spasms 90 tablet 1    lisinopril-hydroCHLOROthiazide (PRINZIDE;ZESTORETIC) 10-12.5 MG per tablet Take 1 tablet by mouth daily 90 tablet 1    metaxalone (SKELAXIN) 800 MG tablet TK 1/2 T PO Q 12 H PRN 30 tablet 2    Cyanocobalamin (B-12) 5000 MCG SUBL Place under the tongue      vitamin D (CHOLECALCIFEROL) 1000 UNIT TABS tablet Take 2,000 Units by mouth daily       aspirin 81 MG tablet Take 81 mg by mouth daily      Cetirizine HCl (ZYRTEC PO) Take 10 mg by mouth daily       Multiple Vitamins-Minerals (CERTA-MARY WITH MINERALS ORAL) solution Take 15 mLs by mouth daily       No current facility-administered medications for this visit. Allergies   Allergen Reactions    Azithromycin Other (See Comments)     Chest pain/irruglar heat beat        Lortab [Hydrocodone-Acetaminophen]     Meperidine Other (See Comments)     unsure    Morphine Hives     Social History     Tobacco Use    Smoking status: Never Smoker    Smokeless tobacco: Never Used   Substance Use Topics    Alcohol use: No     Alcohol/week: 0.0 standard drinks      Family History   Problem Relation Age of Onset    Coronary Art Dis Mother     Diabetes Mother     High Blood Pressure Mother     Other Mother         thalassemia minor       Review of Systems   Constitutional: Negative for chills, fatigue and fever. HENT: Negative for congestion, ear pain, nosebleeds, postnasal drip and sore throat. Respiratory: Negative for cough, chest tightness and wheezing. Cardiovascular: Negative for chest pain, palpitations and leg swelling. Gastrointestinal: Negative for abdominal pain and constipation. Genitourinary: Negative for dysuria and urgency. Musculoskeletal: Negative. Negative for arthralgias. Skin: Negative for rash. Neurological: Negative for dizziness and headaches. Psychiatric/Behavioral: Negative. Vitals:    06/24/21 0818   BP: 120/70   Site: Left Upper Arm   Position: Sitting   Cuff Size: Large Adult   Pulse: 71   Resp: 18   SpO2: 98%   Weight: 166 lb (75.3 kg)   Height: 5' 4\" (1.626 m)     Body mass index is 28.49 kg/m². Physical Exam  Constitutional:       Appearance: She is well-developed. HENT:      Right Ear: External ear normal.      Left Ear: External ear normal.      Mouth/Throat:      Pharynx: No oropharyngeal exudate. Eyes:      Conjunctiva/sclera: Conjunctivae normal.      Pupils: Pupils are equal, round, and reactive to light. Neck:      Thyroid: No thyromegaly. Vascular: No JVD. Cardiovascular:      Rate and Rhythm: Normal rate. Heart sounds: Normal heart sounds. No murmur heard. Pulmonary:      Effort: No respiratory distress. Breath sounds: Normal breath sounds. No wheezing or rales. Chest:      Chest wall: No tenderness. Abdominal:      General: Bowel sounds are normal.      Palpations: Abdomen is soft. Musculoskeletal:         General: Normal range of motion. Cervical back: Neck supple. Lymphadenopathy:      Cervical: No cervical adenopathy. Skin:     General: Skin is warm. Findings: No rash. Neurological:      Mental Status: She is oriented to person, place, and time.          Lab Review   Orders Only on 06/18/2021   Component Date Value    TSH 06/18/2021 2.220     Vit D, 25-Hydroxy 06/18/2021 51.1     Color, UA 06/18/2021 YELLOW     Clarity, UA 06/18/2021 Clear     Glucose, Ur 06/18/2021 Negative     Bilirubin Urine 06/18/2021 Negative     Ketones, Urine 06/18/2021 TRACE*    Specific Gravity, UA 06/18/2021 1.033     Blood, Urine 06/18/2021 Negative     pH, UA 06/18/2021 5.5     Protein, UA 06/18/2021 Negative     Urobilinogen, Urine 06/18/2021 1.0     Nitrite, Urine 06/18/2021 Negative     Leukocyte Esterase, Urine 06/18/2021 Negative     Cholesterol, Total 06/18/2021 166     Triglycerides 06/18/2021 68     HDL 06/18/2021 64*    LDL Calculated 06/18/2021 88     Hemoglobin A1C 06/18/2021 5.0     Sodium 06/18/2021 140     Potassium 06/18/2021 4.1     Chloride 06/18/2021 104     CO2 06/18/2021 28     Anion Gap 06/18/2021 8     Glucose 06/18/2021 79     BUN 06/18/2021 24*    CREATININE 06/18/2021 0.6     GFR Non- 06/18/2021 >60     GFR  06/18/2021 >59     Calcium 06/18/2021 9.3     Total Protein 06/18/2021 6.9     Albumin 06/18/2021 4.1     Total Bilirubin 06/18/2021 0.5     Alkaline Phosphatase 06/18/2021 68     ALT 06/18/2021 25     AST 06/18/2021 19     WBC 06/18/2021 5.7     RBC 06/18/2021 5.34     Hemoglobin 06/18/2021 11.8*    Hematocrit 06/18/2021 38.0     MCV 06/18/2021 71.2*    MCH 06/18/2021 22.1*    MCHC 06/18/2021 31.1*    RDW 06/18/2021 16.9*    Platelets 21/25/9672 274     MPV 06/18/2021 11.3     Neutrophils % 06/18/2021 50.5     Lymphocytes % 06/18/2021 37.7     Monocytes % 06/18/2021 8.3     Eosinophils % 06/18/2021 2.8     Basophils % 06/18/2021 0.5     Neutrophils Absolute 06/18/2021 2.9     Immature Granulocytes # 06/18/2021 0.0     Lymphocytes Absolute 06/18/2021 2.1     Monocytes Absolute 06/18/2021 0.50     Eosinophils Absolute 06/18/2021 0.20     Basophils Absolute 06/18/2021 0.00            ASSESSMENT/PLAN:    Essential hypertension-  recently gyn office started lisinopril/ hctz  Cont 10/12.5     IFG (impaired fasting glucose)  Continue good diet efforts,   A1c is now normal at 5.0 in 6/2021 (5.3 in 12/2020 ( 5.1( 5.2) ( 5.2)  FBS 81     Pure hypercholesterolemia-  I have personally reviewed and interpreted these lab results and thoroughly discussed with patient  LDL is 88 in 6/2021 (107 in12/2020 ( 92 (49 )( 70)(23)(385),  RX Lipitor 10 mg daily     Vit D level 51 in 6/2021 (35 in 12/2020(52 ( 49)  Takes 2000 iu daily  Increase 400 for wintertime     Previously E\elevated transaminase levels=  NOW NL      PFO/prior history of TIA. Pt follows dr Meme Diana          Orders Placed This Encounter   Procedures    CBC Auto Differential    Comprehensive Metabolic Panel    Hemoglobin A1C    Lipid Panel    Urinalysis    TSH without Reflex    Vitamin D 25 Hydroxy     New Prescriptions    No medications on file         Return in about 6 months (around 12/24/2021). There are no Patient Instructions on file for this visit. EMR Dragon/transcription disclaimer:Significant part of this  encounter note is electronic transcription/translationof spoken language to printed text. The electronic translation of spoken language may be erroneous, or at times, nonsensical words or phrases may be inadvertently transcribed.  Although I have reviewed the note for sucherrors, some may still exist.

## 2021-07-09 RX ORDER — LISINOPRIL AND HYDROCHLOROTHIAZIDE 12.5; 1 MG/1; MG/1
1 TABLET ORAL DAILY
Qty: 90 TABLET | Refills: 1 | Status: SHIPPED | OUTPATIENT
Start: 2021-07-09 | End: 2022-01-03

## 2021-08-12 ENCOUNTER — PATIENT MESSAGE (OUTPATIENT)
Dept: INTERNAL MEDICINE | Age: 56
End: 2021-08-12

## 2021-08-12 DIAGNOSIS — R30.0 DYSURIA: Primary | ICD-10-CM

## 2021-08-12 RX ORDER — PHENAZOPYRIDINE HYDROCHLORIDE 200 MG/1
200 TABLET, FILM COATED ORAL 3 TIMES DAILY PRN
Qty: 6 TABLET | Refills: 0 | Status: SHIPPED | OUTPATIENT
Start: 2021-08-12 | End: 2021-08-14

## 2021-08-12 RX ORDER — CIPROFLOXACIN 250 MG/1
250 TABLET, FILM COATED ORAL 2 TIMES DAILY
Qty: 10 TABLET | Refills: 0 | Status: SHIPPED | OUTPATIENT
Start: 2021-08-12 | End: 2021-08-17

## 2021-08-12 NOTE — TELEPHONE ENCOUNTER
From: Vinny Huerta  Sent: 8/12/2021 10:09 AM CDT  To: [facility] Clinical Staff  Subject: RE: Non-Urgent Medical Question    Burning/pain when I urinate. I used to have them frequently years ago but don't have them much any more. I'm working from home in [place]. Do I need an appointment to come to lab? I hate trying to cross the bridge with all the traffic and wrecks.

## 2021-09-23 ENCOUNTER — OFFICE VISIT (OUTPATIENT)
Dept: CARDIOLOGY | Facility: CLINIC | Age: 56
End: 2021-09-23

## 2021-09-23 VITALS
DIASTOLIC BLOOD PRESSURE: 70 MMHG | WEIGHT: 165 LBS | OXYGEN SATURATION: 99 % | BODY MASS INDEX: 27.49 KG/M2 | HEIGHT: 65 IN | HEART RATE: 65 BPM | SYSTOLIC BLOOD PRESSURE: 116 MMHG

## 2021-09-23 DIAGNOSIS — E78.2 MIXED HYPERLIPIDEMIA: ICD-10-CM

## 2021-09-23 DIAGNOSIS — Q21.12 PFO WITH ATRIAL SEPTAL ANEURYSM: Primary | ICD-10-CM

## 2021-09-23 DIAGNOSIS — R00.2 PALPITATIONS: ICD-10-CM

## 2021-09-23 DIAGNOSIS — I25.3 PFO WITH ATRIAL SEPTAL ANEURYSM: Primary | ICD-10-CM

## 2021-09-23 PROCEDURE — 93000 ELECTROCARDIOGRAM COMPLETE: CPT | Performed by: INTERNAL MEDICINE

## 2021-09-23 PROCEDURE — 99214 OFFICE O/P EST MOD 30 MIN: CPT | Performed by: INTERNAL MEDICINE

## 2021-09-23 RX ORDER — CYCLOBENZAPRINE HCL 10 MG
10 TABLET ORAL 3 TIMES DAILY PRN
COMMUNITY
Start: 2021-07-24

## 2021-09-23 RX ORDER — ESTRADIOL 0.5 MG/1
TABLET ORAL DAILY
COMMUNITY

## 2021-09-23 NOTE — PROGRESS NOTES
Subjective:     Encounter Date:09/23/2021      Patient ID: Marichuy Mosqueda is a 55 y.o. female with a history of a patent foramen ovale and interatrial septal aneurysm, hyperlipidemia, intermittent palpitations who presents for follow-up.    Chief Complaint: Here today for routine follow-up    This patient presents today for routine follow-up.  She has a history of a PFO and interatrial septal aneurysm.  She denies having any strokelike symptoms since we last saw her.  She has never had an indication for PFO closure.  The patient does have a history of intermittent palpitations.  She describes being stable over the past 12 months.  No increase in symptoms.  No lightheadedness, dizziness, syncope, chest discomfort.  Breathing is stable.  No lower extremity edema.  She does have a history of hyperlipidemia and remains on statin therapy.  Her most recent lipid panel is referenced below.  Otherwise, no complaints at this time.    Hyperlipidemia  This is a chronic problem. The problem is controlled. Recent lipid tests were reviewed and are normal. There are no known factors aggravating her hyperlipidemia. Pertinent negatives include no chest pain, focal sensory loss, focal weakness, leg pain, myalgias or shortness of breath. Current antihyperlipidemic treatment includes statins. The current treatment provides significant improvement of lipids. There are no compliance problems.  Risk factors for coronary artery disease include dyslipidemia.       Current Outpatient Medications:   •  acyclovir (ZOVIRAX) 200 MG capsule, Take  by mouth Every 4 (Four) Hours While Awake., Disp: , Rfl:   •  aspirin 81 MG EC tablet, Take 81 mg by mouth Daily., Disp: , Rfl:   •  atorvastatin (LIPITOR) 10 MG tablet, TK 1 T PO QD, Disp: , Rfl: 3  •  cetirizine (zyrTEC) 10 MG tablet, Take 10 mg by mouth Daily., Disp: , Rfl:   •  cyclobenzaprine (FLEXERIL) 10 MG tablet, Take 10 mg by mouth 3 (Three) Times a Day As Needed., Disp: , Rfl:   •   estradiol (ESTRACE) 0.5 MG tablet, Take 1 mg by mouth Daily., Disp: , Rfl:   •  metaxalone (SKELAXIN) 800 MG tablet, TK 1/2 T PO Q 12 H PRN, Disp: , Rfl: 5  •  Multiple Vitamin (MULTI VITAMIN PO), Take  by mouth Daily., Disp: , Rfl:   •  AMRIX 15 MG 24 hr capsule, , Disp: , Rfl: 5  •  pantoprazole (PROTONIX) 40 MG EC tablet, Take 40 mg by mouth Daily., Disp: , Rfl:     Allergies   Allergen Reactions   • Azithromycin Other (See Comments)     Chest pain   • Lortab [Hydrocodone-Acetaminophen] Itching   • Morphine Hives     Social History     Tobacco Use   • Smoking status: Never Smoker   • Smokeless tobacco: Never Used   Substance Use Topics   • Alcohol use: Yes     Comment: Socially     Review of Systems   Constitutional: Negative for fever and weight loss.   Cardiovascular: Negative for chest pain, dyspnea on exertion, leg swelling, orthopnea, palpitations, paroxysmal nocturnal dyspnea and syncope.   Respiratory: Negative for cough, shortness of breath and wheezing.    Hematologic/Lymphatic: Negative for adenopathy and bleeding problem.   Musculoskeletal: Negative for myalgias.   Gastrointestinal: Negative for abdominal pain, nausea and vomiting.   Neurological: Negative for dizziness, focal weakness, headaches and loss of balance.         ECG 12 Lead    Date/Time: 9/23/2021 4:07 PM  Performed by: Julio Tipton MD  Authorized by: Julio Tipton MD   Comparison: compared with previous ECG from 3/7/2019  Similar to previous ECG  Rhythm: sinus bradycardia  Rate: bradycardic  BPM: 59  Conduction: left bundle branch block  QRS axis: left    Clinical impression: abnormal EKG               Objective:     Vitals reviewed.   Constitutional:       General: Not in acute distress.     Appearance: Healthy appearance. Well-developed.   Eyes:      Extraocular Movements: Extraocular movements intact.      Pupils: Pupils are equal, round, and reactive to light.   HENT:      Head: Normocephalic and atraumatic.  "  Neck:      Thyroid: No thyroid mass.      Vascular: No JVD.   Pulmonary:      Effort: Pulmonary effort is normal.      Breath sounds: Normal breath sounds. No wheezing. No rhonchi. No rales.   Cardiovascular:      Normal rate. Regular rhythm.      Murmurs: There is no murmur.      No gallop.   Pulses:     Intact distal pulses.   Edema:     Peripheral edema absent.   Abdominal:      General: Bowel sounds are normal. There is no distension.      Palpations: Abdomen is soft.      Tenderness: There is no abdominal tenderness.   Musculoskeletal: Normal range of motion.      Extremities: No clubbing present.     Cervical back: Normal range of motion and neck supple. Skin:     General: Skin is warm and dry. There is no cyanosis.      Findings: No erythema or rash.   Neurological:      Mental Status: Alert and oriented to person, place, and time.      Cranial Nerves: No cranial nerve deficit.       /70   Pulse 65   Ht 163.8 cm (64.5\")   Wt 74.8 kg (165 lb)   SpO2 99%   BMI 27.88 kg/m²     Data/Lab Review:     Results for orders placed during the hospital encounter of 01/25/18    Adult Transthoracic Echo Complete W/ Cont if Necessary Per Protocol    Interpretation Summary  · Left ventricular systolic function is normal. Estimated EF appears to be in the range of 56 - 60%.  · Septal wall motion is abnormal, consistent with a bundle branch block.  · Left ventricular wall thickness is consistent with moderate concentric hypertrophy.  · No significant valvular abnormalities.    Lab Results   Component Value Date    WBC 5.7 06/18/2021    HGB 11.8 (L) 06/18/2021    HCT 38.0 06/18/2021    MCV 71.2 (L) 06/18/2021     06/18/2021     Lab Results   Component Value Date    GLUCOSE 79 06/18/2021    CALCIUM 9.3 06/18/2021     06/18/2021    K 4.1 06/18/2021    CO2 28 06/18/2021     06/18/2021    BUN 24 (H) 06/18/2021    CREATININE 0.6 06/18/2021    EGFRIFAFRI >59 06/18/2021    EGFRIFNONA >60 06/18/2021    " ANIONGAP 8 06/18/2021     Lab Results   Component Value Date    CHLPL 166 06/18/2021    TRIG 68 06/18/2021    HDL 64 (L) 06/18/2021    LDL 88 06/18/2021     Lab Results   Component Value Date    TSH 2.220 06/18/2021         Assessment:          Diagnosis Plan   1. PFO with atrial septal aneurysm     2. Palpitations  ECG 12 Lead   3. Mixed hyperlipidemia          Plan:       1.  Patent foramen ovale: The patient remains stable.  She is known to have a PFO but has never experienced any strokelike symptoms, therefore has no indication for PFO closure at this time.     2.  Palpitations: Stable at this time.  No increased symptoms since we last evaluated the patient.    3.  Mixed hyperlipidemia: Most recent lipid panel is referenced above.  The patient remains on statin therapy.  She is tolerating this well.  No changes at this time.     We will see the patient back in 2 years unless otherwise needed sooner.  The patient was instructed to call return at any point in the future if she develops any cardiac issues, however.

## 2021-12-16 RX ORDER — ATORVASTATIN CALCIUM 10 MG/1
TABLET, FILM COATED ORAL
Qty: 90 TABLET | Refills: 1 | Status: SHIPPED | OUTPATIENT
Start: 2021-12-16 | End: 2022-06-20 | Stop reason: SDUPTHER

## 2021-12-20 DIAGNOSIS — R73.01 IFG (IMPAIRED FASTING GLUCOSE): ICD-10-CM

## 2021-12-20 DIAGNOSIS — E55.9 VITAMIN D DEFICIENCY: ICD-10-CM

## 2021-12-20 DIAGNOSIS — Z00.00 ANNUAL PHYSICAL EXAM: ICD-10-CM

## 2021-12-20 DIAGNOSIS — I10 ESSENTIAL HYPERTENSION: ICD-10-CM

## 2021-12-20 DIAGNOSIS — E78.00 PURE HYPERCHOLESTEROLEMIA: ICD-10-CM

## 2021-12-20 LAB
ALBUMIN SERPL-MCNC: 4.2 G/DL (ref 3.5–5.2)
ALP BLD-CCNC: 85 U/L (ref 35–104)
ALT SERPL-CCNC: 40 U/L (ref 5–33)
ANION GAP SERPL CALCULATED.3IONS-SCNC: 14 MMOL/L (ref 7–19)
AST SERPL-CCNC: 24 U/L (ref 5–32)
BASOPHILS ABSOLUTE: 0 K/UL (ref 0–0.2)
BASOPHILS RELATIVE PERCENT: 0.5 % (ref 0–1)
BILIRUB SERPL-MCNC: 0.6 MG/DL (ref 0.2–1.2)
BILIRUBIN URINE: NEGATIVE
BLOOD, URINE: NEGATIVE
BUN BLDV-MCNC: 27 MG/DL (ref 6–20)
CALCIUM SERPL-MCNC: 9.6 MG/DL (ref 8.6–10)
CHLORIDE BLD-SCNC: 102 MMOL/L (ref 98–111)
CHOLESTEROL, TOTAL: 233 MG/DL (ref 160–199)
CLARITY: CLEAR
CO2: 24 MMOL/L (ref 22–29)
COLOR: YELLOW
CREAT SERPL-MCNC: 0.6 MG/DL (ref 0.5–0.9)
EOSINOPHILS ABSOLUTE: 0.2 K/UL (ref 0–0.6)
EOSINOPHILS RELATIVE PERCENT: 3.6 % (ref 0–5)
GFR AFRICAN AMERICAN: >59
GFR NON-AFRICAN AMERICAN: >60
GLUCOSE BLD-MCNC: 84 MG/DL (ref 74–109)
GLUCOSE URINE: NEGATIVE MG/DL
HBA1C MFR BLD: 5.2 % (ref 4–6)
HCT VFR BLD CALC: 40.9 % (ref 37–47)
HDLC SERPL-MCNC: 68 MG/DL (ref 65–121)
HEMOGLOBIN: 12.5 G/DL (ref 12–16)
IMMATURE GRANULOCYTES #: 0 K/UL
KETONES, URINE: NEGATIVE MG/DL
LDL CHOLESTEROL CALCULATED: 142 MG/DL
LEUKOCYTE ESTERASE, URINE: NEGATIVE
LYMPHOCYTES ABSOLUTE: 2.4 K/UL (ref 1.1–4.5)
LYMPHOCYTES RELATIVE PERCENT: 39.7 % (ref 20–40)
MCH RBC QN AUTO: 21.6 PG (ref 27–31)
MCHC RBC AUTO-ENTMCNC: 30.6 G/DL (ref 33–37)
MCV RBC AUTO: 70.8 FL (ref 81–99)
MONOCYTES ABSOLUTE: 0.5 K/UL (ref 0–0.9)
MONOCYTES RELATIVE PERCENT: 8.8 % (ref 0–10)
NEUTROPHILS ABSOLUTE: 2.9 K/UL (ref 1.5–7.5)
NEUTROPHILS RELATIVE PERCENT: 47.1 % (ref 50–65)
NITRITE, URINE: NEGATIVE
PDW BLD-RTO: 15.9 % (ref 11.5–14.5)
PH UA: 5 (ref 5–8)
PLATELET # BLD: 290 K/UL (ref 130–400)
PMV BLD AUTO: 10.6 FL (ref 9.4–12.3)
POTASSIUM SERPL-SCNC: 4.4 MMOL/L (ref 3.5–5)
PROTEIN UA: NEGATIVE MG/DL
RBC # BLD: 5.78 M/UL (ref 4.2–5.4)
SODIUM BLD-SCNC: 140 MMOL/L (ref 136–145)
SPECIFIC GRAVITY UA: 1.02 (ref 1–1.03)
TOTAL PROTEIN: 7 G/DL (ref 6.6–8.7)
TRIGL SERPL-MCNC: 113 MG/DL (ref 0–149)
TSH SERPL DL<=0.05 MIU/L-ACNC: 3.83 UIU/ML (ref 0.27–4.2)
UROBILINOGEN, URINE: 0.2 E.U./DL
VITAMIN D 25-HYDROXY: 47.7 NG/ML
WBC # BLD: 6.1 K/UL (ref 4.8–10.8)

## 2021-12-27 ENCOUNTER — OFFICE VISIT (OUTPATIENT)
Dept: INTERNAL MEDICINE | Age: 56
End: 2021-12-27
Payer: COMMERCIAL

## 2021-12-27 VITALS
BODY MASS INDEX: 27.82 KG/M2 | DIASTOLIC BLOOD PRESSURE: 68 MMHG | OXYGEN SATURATION: 98 % | HEART RATE: 72 BPM | SYSTOLIC BLOOD PRESSURE: 120 MMHG | WEIGHT: 167 LBS | HEIGHT: 65 IN | RESPIRATION RATE: 18 BRPM

## 2021-12-27 DIAGNOSIS — Z12.31 ENCOUNTER FOR SCREENING MAMMOGRAM FOR BREAST CANCER: Primary | ICD-10-CM

## 2021-12-27 DIAGNOSIS — E55.9 VITAMIN D DEFICIENCY: ICD-10-CM

## 2021-12-27 DIAGNOSIS — M85.852 OSTEOPENIA OF LEFT THIGH: ICD-10-CM

## 2021-12-27 DIAGNOSIS — R73.01 IFG (IMPAIRED FASTING GLUCOSE): ICD-10-CM

## 2021-12-27 DIAGNOSIS — E78.00 PURE HYPERCHOLESTEROLEMIA: ICD-10-CM

## 2021-12-27 DIAGNOSIS — Z00.00 ANNUAL PHYSICAL EXAM: ICD-10-CM

## 2021-12-27 DIAGNOSIS — I10 ESSENTIAL HYPERTENSION: ICD-10-CM

## 2021-12-27 PROCEDURE — 99396 PREV VISIT EST AGE 40-64: CPT | Performed by: INTERNAL MEDICINE

## 2021-12-27 RX ORDER — ACYCLOVIR 800 MG/1
TABLET ORAL
Qty: 90 TABLET | Refills: 2 | Status: SHIPPED | OUTPATIENT
Start: 2021-12-27 | End: 2022-06-20 | Stop reason: SDUPTHER

## 2021-12-27 ASSESSMENT — ENCOUNTER SYMPTOMS
WHEEZING: 0
ABDOMINAL PAIN: 0
SORE THROAT: 0
CHEST TIGHTNESS: 0
COUGH: 0
CONSTIPATION: 0

## 2021-12-27 NOTE — PROGRESS NOTES
Chief Complaint:   Goran Benson is a 64 y.o. female who presents forcomplete physical exam.    History of Present Illness:      Goran Benson is a 64 y.o. female who presents todayfor wellness visit AND follow up on her chronic medical conditions as noted below.       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    Elevated transaminase level 11/07/2017    PFO (patent foramen ovale) 11/07/2017       Past Medical History:   Diagnosis Date    Acid reflux     Allergic rhinitis     Asthma     Asthma exacerbation 8/19/2017    Bronchitis     Chronic back pain     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HCC)     Essential hypertension     Gastroesophageal reflux disease without esophagitis     Herpes simplex     Hyperlipidemia     Irregular heart rhythm     Neck pain, chronic        Past Surgical History:   Procedure Laterality Date    BREAST SURGERY      CHOLECYSTECTOMY      HYSTERECTOMY, VAGINAL      one ovary remians       Current Outpatient Medications   Medication Sig Dispense Refill    acyclovir (ZOVIRAX) 800 MG tablet TK 1 T PO D 90 tablet 2    atorvastatin (LIPITOR) 10 MG tablet TAKE 1 TABLET BY MOUTH EVERY DAY 90 tablet 1    lisinopril-hydroCHLOROthiazide (PRINZIDE;ZESTORETIC) 10-12.5 MG per tablet TAKE 1 TABLET BY MOUTH DAILY 90 tablet 1    estradiol (ESTRACE) 1 MG tablet Take 1 tablet by mouth daily 90 tablet 3    cyclobenzaprine (FLEXERIL) 10 MG tablet Take 1 tablet by mouth nightly as needed for Muscle spasms 90 tablet 1    Cyanocobalamin (B-12) 5000 MCG SUBL Place under the tongue      vitamin D (CHOLECALCIFEROL) 1000 UNIT TABS tablet Take 2,000 Units by mouth daily       aspirin 81 MG tablet Take 81 mg by mouth daily      Cetirizine HCl (ZYRTEC PO) Take 10 mg by mouth daily       Multiple Vitamins-Minerals (CERTA-MARY WITH MINERALS ORAL) solution Take 15 mLs by mouth daily      albuterol (PROVENTIL) (2.5 MG/3ML) 0.083% nebulizer solution Take 3 mLs by nebulization every 6 hours as needed for Wheezing (Patient not taking: Reported on 12/27/2021) 120 each 1    albuterol sulfate HFA (PROAIR HFA) 108 (90 Base) MCG/ACT inhaler Inhale 2 puffs into the lungs every 6 hours as needed for Wheezing (Patient not taking: Reported on 12/27/2021) 1 Inhaler 3    metaxalone (SKELAXIN) 800 MG tablet TK 1/2 T PO Q 12 H PRN (Patient not taking: Reported on 12/27/2021) 30 tablet 2     No current facility-administered medications for this visit.      Allergies   Allergen Reactions    Azithromycin Other (See Comments)     Chest pain/irruglar heat beat        Lortab [Hydrocodone-Acetaminophen]     Meperidine Other (See Comments)     unsure    Morphine Hives       Social History     Socioeconomic History    Marital status:      Spouse name: None    Number of children: None    Years of education: None    Highest education level: None   Occupational History    None   Tobacco Use    Smoking status: Never Smoker    Smokeless tobacco: Never Used   Substance and Sexual Activity    Alcohol use: No     Alcohol/week: 0.0 standard drinks    Drug use: No    Sexual activity: None   Other Topics Concern    None   Social History Narrative    None     Social Determinants of Health     Financial Resource Strain: Unknown    Difficulty of Paying Living Expenses: Patient refused   Food Insecurity: Unknown    Worried About Running Out of Food in the Last Year: Patient refused    Ran Out of Food in the Last Year: Patient refused   Transportation Needs: Unknown    Lack of Transportation (Medical): Patient refused    Lack of Transportation (Non-Medical): Patient refused   Physical Activity:     Days of Exercise per Week: Not on file    Minutes of Exercise per Session: Not on file   Stress:     Feeling of Stress : Not on file   Social Connections:     Frequency of Communication with Friends and Family: Not on file    Frequency of Social Gatherings with Friends and Family: Not on file    Attends Shinto Services: Not on file    Active Member of Clubs or Organizations: Not on file    Attends Club or Organization Meetings: Not on file    Marital Status: Not on file   Intimate Partner Violence:     Fear of Current or Ex-Partner: Not on file    Emotionally Abused: Not on file    Physically Abused: Not on file    Sexually Abused: Not on file   Housing Stability:     Unable to Pay for Housing in the Last Year: Not on file    Number of Places Lived in the Last Year: Not on file    Unstable Housing in the Last Year: Not on file     Family History   Problem Relation Age of Onset    Coronary Art Dis Mother     Diabetes Mother     High Blood Pressure Mother     Other Mother         thalassemia minor          Past Surgical History:   Procedure Laterality Date    BREAST SURGERY      CHOLECYSTECTOMY      HYSTERECTOMY, VAGINAL      one ovary remians         Lab Review   Orders Only on 12/20/2021   Component Date Value    Vit D, 25-Hydroxy 12/20/2021 47.7     TSH 12/20/2021 3.830     Color, UA 12/20/2021 YELLOW     Clarity, UA 12/20/2021 Clear     Glucose, Ur 12/20/2021 Negative     Bilirubin Urine 12/20/2021 Negative     Ketones, Urine 12/20/2021 Negative     Specific Gravity, UA 12/20/2021 1.025     Blood, Urine 12/20/2021 Negative     pH, UA 12/20/2021 5.0     Protein, UA 12/20/2021 Negative     Urobilinogen, Urine 12/20/2021 0.2     Nitrite, Urine 12/20/2021 Negative     Leukocyte Esterase, Urine 12/20/2021 Negative     Cholesterol, Total 12/20/2021 233*    Triglycerides 12/20/2021 113     HDL 12/20/2021 68     LDL Calculated 12/20/2021 142     Hemoglobin A1C 12/20/2021 5.2     Sodium 12/20/2021 140     Potassium 12/20/2021 4.4     Chloride 12/20/2021 102     CO2 12/20/2021 24     Anion Gap 12/20/2021 14     Glucose 12/20/2021 84     BUN 12/20/2021 27*    CREATININE 12/20/2021 0.6     GFR Non- 12/20/2021 >60     GFR  12/20/2021 >59     Calcium 12/20/2021 9.6     Total Protein 12/20/2021 7.0     Albumin 12/20/2021 4.2     Total Bilirubin 12/20/2021 0.6     Alkaline Phosphatase 12/20/2021 85     ALT 12/20/2021 40*    AST 12/20/2021 24     WBC 12/20/2021 6.1     RBC 12/20/2021 5.78*    Hemoglobin 12/20/2021 12.5     Hematocrit 12/20/2021 40.9     MCV 12/20/2021 70.8*    MCH 12/20/2021 21.6*    MCHC 12/20/2021 30.6*    RDW 12/20/2021 15.9*    Platelets 35/31/1373 290     MPV 12/20/2021 10.6     Neutrophils % 12/20/2021 47.1*    Lymphocytes % 12/20/2021 39.7     Monocytes % 12/20/2021 8.8     Eosinophils % 12/20/2021 3.6     Basophils % 12/20/2021 0.5     Neutrophils Absolute 12/20/2021 2.9     Immature Granulocytes # 12/20/2021 0.0     Lymphocytes Absolute 12/20/2021 2.4     Monocytes Absolute 12/20/2021 0.50     Eosinophils Absolute 12/20/2021 0.20     Basophils Absolute 12/20/2021 0.00          Review of Systems   Constitutional: Negative for chills, fatigue and fever. HENT: Negative for congestion, ear pain, nosebleeds, postnasal drip and sore throat. Respiratory: Negative for cough, chest tightness and wheezing. Cardiovascular: Negative for chest pain, palpitations and leg swelling. Gastrointestinal: Negative for abdominal pain and constipation. Genitourinary: Negative for dysuria and urgency. Musculoskeletal: Negative. Negative for arthralgias. Skin: Negative for rash. Neurological: Negative for dizziness and headaches. Psychiatric/Behavioral: Negative.            Vitals:    12/27/21 0844   BP: 120/68 Site: Left Upper Arm   Position: Sitting   Cuff Size: Large Adult   Pulse: 72   Resp: 18   SpO2: 98%   Weight: 167 lb (75.8 kg)   Height: 5' 4.5\" (1.638 m)      Wt Readings from Last 3 Encounters:   12/27/21 167 lb (75.8 kg)   06/24/21 166 lb (75.3 kg)   12/28/20 175 lb (79.4 kg)   Body mass index is 28.22 kg/m². BP Readings from Last 3 Encounters:   12/27/21 120/68   06/24/21 120/70   12/28/20 120/70       Physical Exam  Constitutional:       Appearance: She is well-developed. HENT:      Right Ear: External ear normal.      Left Ear: External ear normal.      Mouth/Throat:      Pharynx: No oropharyngeal exudate. Eyes:      Conjunctiva/sclera: Conjunctivae normal.      Pupils: Pupils are equal, round, and reactive to light. Neck:      Thyroid: No thyromegaly. Vascular: No JVD. Cardiovascular:      Rate and Rhythm: Normal rate. Heart sounds: Normal heart sounds. No murmur heard. Pulmonary:      Effort: No respiratory distress. Breath sounds: Normal breath sounds. No wheezing or rales. Chest:      Chest wall: No tenderness. Abdominal:      General: Bowel sounds are normal.      Palpations: Abdomen is soft. Musculoskeletal:      Cervical back: Neck supple. Lymphadenopathy:      Cervical: No cervical adenopathy. Skin:     General: Skin is warm. Findings: No rash. Neurological:      Mental Status: She is oriented to person, place, and time.      Breast exam  Bilateral breast exam- symmetric, no nodules, no lymphadenopathy, no nipple discharge              ASSESSMENT/PLAN    Annual physical exam  *Screening for colorectal cancer 2017/5 yrs  * mammogram- schedule  * PAP 10/2019-  Needs pelvic exam 12/2022  * BD-repeat 2022 2/2018 hip neck -1.2; Lspine nl    Essential hypertension-  recently gyn office started lisinopril/ hctz  Cont 10/12.5     IFG (impaired fasting glucose)  Continue good diet efforts,   A1c is now normal at 5.2 in 12/2021   FBS 81     Pure hypercholesterolemia-  I have personally reviewed and interpreted these lab results and thoroughly discussed with patient  LDL is 142 in 12/2021 (88 in 6/2021)  RX Lipitor 10 mg daily  Healthy, mostly fiber rich nonstarchy plant-based diet recommended  Recommend to decrease intake of processed foods, simple carbohydrates and animal-based products that high in saturated fats       Vit D level 47 in 12/2021   Takes 2000 iu daily  Increase 400 for wintertime     Previously E\elevated transaminase levels=  NOW NL      PFO/prior history of TIA. Pt follows dr Pilar Vieira        Orders Placed This Encounter   Procedures    BHASKAR DIGITAL SCREEN W OR WO CAD BILATERAL    DEXA BONE DENSITY 2 SITES    Lipid Panel    Comprehensive Metabolic Panel    Vitamin D 25 Hydroxy     New Prescriptions    No medications on file      There are no Patient Instructions on file for this visit. Return in about 6 months (around 6/27/2022) for Medication check. EMR Dragon/transcription disclaimer:Significant part of this  encounter note is electronic transcription/translation of spoken language to printed text. The electronic translation of spoken language may beerroneous, or at times, nonsensical words or phrases may be inadvertently transcribed.  Although I have reviewed the note for such errors, some may still exist.

## 2022-01-03 RX ORDER — LISINOPRIL AND HYDROCHLOROTHIAZIDE 12.5; 1 MG/1; MG/1
1 TABLET ORAL DAILY
Qty: 90 TABLET | Refills: 1 | Status: SHIPPED | OUTPATIENT
Start: 2022-01-03 | End: 2022-06-20 | Stop reason: SDUPTHER

## 2022-01-03 NOTE — TELEPHONE ENCOUNTER
Last Appointment Date: 12/27/2021  Next Appointment Date: 6/20/2022    Allergies   Allergen Reactions    Azithromycin Other (See Comments)     Chest pain/irruglar heat beat        Lortab [Hydrocodone-Acetaminophen]     Meperidine Other (See Comments)     unsure    Morphine Hives       Patient needs refill on   Requested Prescriptions     Pending Prescriptions Disp Refills    lisinopril-hydroCHLOROthiazide (PRINZIDE;ZESTORETIC) 10-12.5 MG per tablet [Pharmacy Med Name: LISINOPRIL-HCTZ 10/12.5MG TABLETS] 90 tablet 1     Sig: TAKE 1 TABLET BY MOUTH DAILY

## 2022-01-18 RX ORDER — ESTRADIOL 1 MG/1
1 TABLET ORAL DAILY
Qty: 90 TABLET | Refills: 3 | Status: SHIPPED | OUTPATIENT
Start: 2022-01-18

## 2022-02-01 ENCOUNTER — OFFICE VISIT (OUTPATIENT)
Dept: GASTROENTEROLOGY | Facility: CLINIC | Age: 57
End: 2022-02-01

## 2022-02-01 VITALS
HEART RATE: 88 BPM | DIASTOLIC BLOOD PRESSURE: 68 MMHG | SYSTOLIC BLOOD PRESSURE: 120 MMHG | WEIGHT: 167 LBS | BODY MASS INDEX: 27.82 KG/M2 | OXYGEN SATURATION: 98 % | TEMPERATURE: 97 F | HEIGHT: 65 IN

## 2022-02-01 DIAGNOSIS — Z86.010 HISTORY OF COLON POLYPS: Primary | ICD-10-CM

## 2022-02-01 PROBLEM — Z86.0100 HISTORY OF COLON POLYPS: Status: ACTIVE | Noted: 2022-02-01

## 2022-02-01 PROCEDURE — S0285 CNSLT BEFORE SCREEN COLONOSC: HCPCS | Performed by: NURSE PRACTITIONER

## 2022-02-01 RX ORDER — CYCLOBENZAPRINE HCL 10 MG
10 TABLET ORAL 3 TIMES DAILY PRN
COMMUNITY

## 2022-02-01 RX ORDER — LISINOPRIL AND HYDROCHLOROTHIAZIDE 12.5; 1 MG/1; MG/1
1 TABLET ORAL DAILY
COMMUNITY
Start: 2022-01-03

## 2022-02-01 RX ORDER — SODIUM PICOSULFATE, MAGNESIUM OXIDE, AND ANHYDROUS CITRIC ACID 10; 3.5; 12 MG/160ML; G/160ML; G/160ML
1 LIQUID ORAL TAKE AS DIRECTED
Qty: 320 ML | Refills: 0 | Status: SHIPPED | OUTPATIENT
Start: 2022-02-01

## 2022-02-01 NOTE — PROGRESS NOTES
Chief Complaint   Patient presents with   • Colonoscopy     2-3-17 colon polyps 5 year recall       PCP: Huyen Ramirez MD  REFER: No ref. provider found    Subjective     HPI    Marichuy Mosqueda is a 56 y.o. female who presents to office for preventative maintenance.  There is  a personal history of colon polyps.  There is not a history of colon cancer.  She does not have complaints of nausea/vomiting, change in bowels, weight loss, no BRBPR, no melena.  There is not a family history of colon cancer in first degree relative.  There is not a family history of colon polyps in first degree relative.  Her last colonoscopy-2017 .  Bowels do move on regular basis.    CScope (Dr Cooper) 2017-dimunitive polyp     Past Medical History:   Diagnosis Date   • Breast lump    • Endometriosis    • Genital herpes    • Hyperlipidemia    • Hypertension    • Left bundle branch block    • PFO with atrial septal aneurysm    • PONV (postoperative nausea and vomiting)      Past Surgical History:   Procedure Laterality Date   • BREAST SURGERY     • CHOLECYSTECTOMY     • COLONOSCOPY  2000    normal   • COLONOSCOPY N/A 2/3/2017    Procedure: COLONOSCOPY WITH ANESTHESIA;  Surgeon: Ton Cooper DO;  Location: [facility] ENDOSCOPY;  Service:    • HYSTERECTOMY      partial     Outpatient Medications Marked as Taking for the 2/1/22 encounter (Office Visit) with Sandeep Shaikh APRN   Medication Sig Dispense Refill   • acyclovir (ZOVIRAX) 200 MG capsule Take  by mouth Every 4 (Four) Hours While Awake.     • aspirin 81 MG EC tablet Take 81 mg by mouth Daily.     • atorvastatin (LIPITOR) 10 MG tablet TK 1 T PO QD  3   • cetirizine (zyrTEC) 10 MG tablet Take 10 mg by mouth Daily.     • cyclobenzaprine (FLEXERIL) 10 MG tablet Take 10 mg by mouth 3 (Three) Times a Day As Needed.     • cyclobenzaprine (FLEXERIL) 10 MG tablet Take 10 mg by mouth 3 (Three) Times a Day As Needed for Muscle Spasms.     • estradiol (ESTRACE) 0.5 MG tablet Take  by  mouth Daily.     • folic acid-vit B6-vit B12 (FOLTABS) 0.8-10-0.115 MG tablet tablet Take  by mouth Daily.     • lisinopril-hydrochlorothiazide (PRINZIDE,ZESTORETIC) 10-12.5 MG per tablet Take 1 tablet by mouth Daily.     • metaxalone (SKELAXIN) 800 MG tablet TK 1/2 T PO Q 12 H PRN  5   • Multiple Vitamin (MULTI VITAMIN PO) Take  by mouth Daily.     • vitamin D3 125 MCG (5000 UT) capsule capsule Take 5,000 Units by mouth Daily.       Allergies   Allergen Reactions   • Azithromycin Other (See Comments)     Chest pain   • Lortab [Hydrocodone-Acetaminophen] Itching   • Morphine Hives     Social History     Socioeconomic History   • Marital status:    Tobacco Use   • Smoking status: Never Smoker   • Smokeless tobacco: Never Used   Vaping Use   • Vaping Use: Never used   Substance and Sexual Activity   • Alcohol use: Yes     Comment: Socially   • Drug use: No   • Sexual activity: Defer     Review of Systems   Constitutional: Negative for unexpected weight change.   Respiratory: Negative for shortness of breath.    Cardiovascular: Negative for chest pain.   Gastrointestinal: Negative for abdominal pain and anal bleeding.     Objective   Vitals:    02/01/22 0912   BP: 120/68   Pulse: 88   Temp: 97 °F (36.1 °C)   SpO2: 98%     Physical Exam  Constitutional:       Appearance: Normal appearance. She is well-developed.   Eyes:      General: No scleral icterus.  Cardiovascular:      Rate and Rhythm: Regular rhythm.      Heart sounds: Normal heart sounds. No murmur heard.      Pulmonary:      Effort: Pulmonary effort is normal. No accessory muscle usage.      Breath sounds: Normal breath sounds.   Abdominal:      General: Bowel sounds are normal. There is no distension.      Palpations: Abdomen is soft. There is no mass.      Tenderness: There is no abdominal tenderness. There is no guarding or rebound.   Skin:     General: Skin is warm and dry.      Coloration: Skin is not jaundiced.   Neurological:      Mental Status:  She is alert.   Psychiatric:         Behavior: Behavior is cooperative.       Imaging Results (Most Recent)     None        Body mass index is 28.22 kg/m².    Assessment/Plan   Diagnoses and all orders for this visit:    1. History of colon polyps (Primary)  -     Case Request; Standing  -     Case Request    Other orders  -     Clenpiq 10-3.5-12 MG-GM -GM/160ML solution; Take 160 mL by mouth Take As Directed.  Dispense: 320 mL; Refill: 0      COLONOSCOPY WITH ANESTHESIA (N/A)    I have suggested utilizing Miralax starting 7 days prior to colonoscopy to help improve prep.  I have explained stool may be loose but we do not want Marichuy Mosqueda to be uncomfortable or experiencing fecal incontinence.  Miralax should be adjusted as needed.      Advised pt to stop use of NSAIDs, Fish Oil, and MV 5 days prior to procedure, per Dr Cooper protocol.  Tylenol based products are ok to take.  Pt verbalized understanding.     All risks, benefits, alternatives, and indications of colonoscopy procedure have been discussed with the patient. Risks to include perforation of the colon requiring possible surgery or colostomy, risk of bleeding from biopsies or removal of colon tissue, possibility of missing a colon polyp or cancer, or adverse drug reaction.  Benefits to include the diagnosis and management of disease of the colon and rectum. Alternatives to include barium enema, radiographic evaluation, lab testing or no intervention. She verbalizes understanding and agrees.         Sandeep Shaikh, APRN  02/01/22        There are no Patient Instructions on file for this visit.

## 2022-02-01 NOTE — H&P (VIEW-ONLY)
Chief Complaint   Patient presents with   • Colonoscopy     2-3-17 colon polyps 5 year recall       PCP: Huyen Ramirez MD  REFER: No ref. provider found    Subjective     HPI    Marichuy Mosqueda is a 56 y.o. female who presents to office for preventative maintenance.  There is  a personal history of colon polyps.  There is not a history of colon cancer.  She does not have complaints of nausea/vomiting, change in bowels, weight loss, no BRBPR, no melena.  There is not a family history of colon cancer in first degree relative.  There is not a family history of colon polyps in first degree relative.  Her last colonoscopy-2017 .  Bowels do move on regular basis.    CScope (Dr Cooper) 2017-dimunitive polyp     Past Medical History:   Diagnosis Date   • Breast lump    • Endometriosis    • Genital herpes    • Hyperlipidemia    • Hypertension    • Left bundle branch block    • PFO with atrial septal aneurysm    • PONV (postoperative nausea and vomiting)      Past Surgical History:   Procedure Laterality Date   • BREAST SURGERY     • CHOLECYSTECTOMY     • COLONOSCOPY  2000    normal   • COLONOSCOPY N/A 2/3/2017    Procedure: COLONOSCOPY WITH ANESTHESIA;  Surgeon: Ton Cooper DO;  Location: [facility] ENDOSCOPY;  Service:    • HYSTERECTOMY      partial     Outpatient Medications Marked as Taking for the 2/1/22 encounter (Office Visit) with Sandeep Shaikh APRN   Medication Sig Dispense Refill   • acyclovir (ZOVIRAX) 200 MG capsule Take  by mouth Every 4 (Four) Hours While Awake.     • aspirin 81 MG EC tablet Take 81 mg by mouth Daily.     • atorvastatin (LIPITOR) 10 MG tablet TK 1 T PO QD  3   • cetirizine (zyrTEC) 10 MG tablet Take 10 mg by mouth Daily.     • cyclobenzaprine (FLEXERIL) 10 MG tablet Take 10 mg by mouth 3 (Three) Times a Day As Needed.     • cyclobenzaprine (FLEXERIL) 10 MG tablet Take 10 mg by mouth 3 (Three) Times a Day As Needed for Muscle Spasms.     • estradiol (ESTRACE) 0.5 MG tablet Take  by  mouth Daily.     • folic acid-vit B6-vit B12 (FOLTABS) 0.8-10-0.115 MG tablet tablet Take  by mouth Daily.     • lisinopril-hydrochlorothiazide (PRINZIDE,ZESTORETIC) 10-12.5 MG per tablet Take 1 tablet by mouth Daily.     • metaxalone (SKELAXIN) 800 MG tablet TK 1/2 T PO Q 12 H PRN  5   • Multiple Vitamin (MULTI VITAMIN PO) Take  by mouth Daily.     • vitamin D3 125 MCG (5000 UT) capsule capsule Take 5,000 Units by mouth Daily.       Allergies   Allergen Reactions   • Azithromycin Other (See Comments)     Chest pain   • Lortab [Hydrocodone-Acetaminophen] Itching   • Morphine Hives     Social History     Socioeconomic History   • Marital status:    Tobacco Use   • Smoking status: Never Smoker   • Smokeless tobacco: Never Used   Vaping Use   • Vaping Use: Never used   Substance and Sexual Activity   • Alcohol use: Yes     Comment: Socially   • Drug use: No   • Sexual activity: Defer     Review of Systems   Constitutional: Negative for unexpected weight change.   Respiratory: Negative for shortness of breath.    Cardiovascular: Negative for chest pain.   Gastrointestinal: Negative for abdominal pain and anal bleeding.     Objective   Vitals:    02/01/22 0912   BP: 120/68   Pulse: 88   Temp: 97 °F (36.1 °C)   SpO2: 98%     Physical Exam  Constitutional:       Appearance: Normal appearance. She is well-developed.   Eyes:      General: No scleral icterus.  Cardiovascular:      Rate and Rhythm: Regular rhythm.      Heart sounds: Normal heart sounds. No murmur heard.      Pulmonary:      Effort: Pulmonary effort is normal. No accessory muscle usage.      Breath sounds: Normal breath sounds.   Abdominal:      General: Bowel sounds are normal. There is no distension.      Palpations: Abdomen is soft. There is no mass.      Tenderness: There is no abdominal tenderness. There is no guarding or rebound.   Skin:     General: Skin is warm and dry.      Coloration: Skin is not jaundiced.   Neurological:      Mental Status:  She is alert.   Psychiatric:         Behavior: Behavior is cooperative.       Imaging Results (Most Recent)     None        Body mass index is 28.22 kg/m².    Assessment/Plan   Diagnoses and all orders for this visit:    1. History of colon polyps (Primary)  -     Case Request; Standing  -     Case Request    Other orders  -     Clenpiq 10-3.5-12 MG-GM -GM/160ML solution; Take 160 mL by mouth Take As Directed.  Dispense: 320 mL; Refill: 0      COLONOSCOPY WITH ANESTHESIA (N/A)    I have suggested utilizing Miralax starting 7 days prior to colonoscopy to help improve prep.  I have explained stool may be loose but we do not want Marichuy Mosqueda to be uncomfortable or experiencing fecal incontinence.  Miralax should be adjusted as needed.      Advised pt to stop use of NSAIDs, Fish Oil, and MV 5 days prior to procedure, per Dr Cooper protocol.  Tylenol based products are ok to take.  Pt verbalized understanding.     All risks, benefits, alternatives, and indications of colonoscopy procedure have been discussed with the patient. Risks to include perforation of the colon requiring possible surgery or colostomy, risk of bleeding from biopsies or removal of colon tissue, possibility of missing a colon polyp or cancer, or adverse drug reaction.  Benefits to include the diagnosis and management of disease of the colon and rectum. Alternatives to include barium enema, radiographic evaluation, lab testing or no intervention. She verbalizes understanding and agrees.         Sandeep Shaikh, APRN  02/01/22        There are no Patient Instructions on file for this visit.

## 2022-02-14 ENCOUNTER — ANESTHESIA EVENT (OUTPATIENT)
Dept: GASTROENTEROLOGY | Facility: HOSPITAL | Age: 57
End: 2022-02-14

## 2022-02-14 ENCOUNTER — ANESTHESIA (OUTPATIENT)
Dept: GASTROENTEROLOGY | Facility: HOSPITAL | Age: 57
End: 2022-02-14

## 2022-02-14 ENCOUNTER — HOSPITAL ENCOUNTER (OUTPATIENT)
Facility: HOSPITAL | Age: 57
Setting detail: HOSPITAL OUTPATIENT SURGERY
Discharge: HOME | End: 2022-02-14
Attending: INTERNAL MEDICINE | Admitting: INTERNAL MEDICINE

## 2022-02-14 ENCOUNTER — TELEPHONE (OUTPATIENT)
Dept: GASTROENTEROLOGY | Facility: CLINIC | Age: 57
End: 2022-02-14

## 2022-02-14 VITALS
HEART RATE: 63 BPM | SYSTOLIC BLOOD PRESSURE: 121 MMHG | HEIGHT: 64 IN | TEMPERATURE: 97 F | DIASTOLIC BLOOD PRESSURE: 77 MMHG | OXYGEN SATURATION: 100 % | BODY MASS INDEX: 28.34 KG/M2 | RESPIRATION RATE: 18 BRPM | WEIGHT: 166 LBS

## 2022-02-14 DIAGNOSIS — Z86.010 HISTORY OF COLON POLYPS: ICD-10-CM

## 2022-02-14 PROCEDURE — 25010000002 PROPOFOL 10 MG/ML EMULSION

## 2022-02-14 PROCEDURE — 45378 DIAGNOSTIC COLONOSCOPY: CPT | Performed by: INTERNAL MEDICINE

## 2022-02-14 RX ORDER — PROPOFOL 10 MG/ML
VIAL (ML) INTRAVENOUS AS NEEDED
Status: DISCONTINUED | OUTPATIENT
Start: 2022-02-14 | End: 2022-02-14 | Stop reason: SURG

## 2022-02-14 RX ORDER — SODIUM CHLORIDE 9 MG/ML
500 INJECTION, SOLUTION INTRAVENOUS CONTINUOUS PRN
Status: DISCONTINUED | OUTPATIENT
Start: 2022-02-14 | End: 2022-02-14 | Stop reason: HOSPADM

## 2022-02-14 RX ORDER — SODIUM CHLORIDE 0.9 % (FLUSH) 0.9 %
10 SYRINGE (ML) INJECTION AS NEEDED
Status: DISCONTINUED | OUTPATIENT
Start: 2022-02-14 | End: 2022-02-14 | Stop reason: HOSPADM

## 2022-02-14 RX ORDER — LIDOCAINE HYDROCHLORIDE 20 MG/ML
INJECTION, SOLUTION EPIDURAL; INFILTRATION; INTRACAUDAL; PERINEURAL AS NEEDED
Status: DISCONTINUED | OUTPATIENT
Start: 2022-02-14 | End: 2022-02-14 | Stop reason: SURG

## 2022-02-14 RX ORDER — LIDOCAINE HYDROCHLORIDE 10 MG/ML
0.5 INJECTION, SOLUTION EPIDURAL; INFILTRATION; INTRACAUDAL; PERINEURAL ONCE AS NEEDED
Status: CANCELLED | OUTPATIENT
Start: 2022-02-14

## 2022-02-14 RX ADMIN — PROPOFOL 200 MG: 10 INJECTION, EMULSION INTRAVENOUS at 08:48

## 2022-02-14 RX ADMIN — LIDOCAINE HYDROCHLORIDE 80 MG: 20 INJECTION, SOLUTION EPIDURAL; INFILTRATION; INTRACAUDAL; PERINEURAL at 08:48

## 2022-02-14 RX ADMIN — SODIUM CHLORIDE 500 ML: 9 INJECTION, SOLUTION INTRAVENOUS at 08:02

## 2022-02-14 NOTE — ANESTHESIA POSTPROCEDURE EVALUATION
"Patient: Marichuy Mosqueda    Procedure Summary     Date: 02/14/22 Room / Location: [facility] ENDOSCOPY 4 / [facility] ENDOSCOPY    Anesthesia Start: 0845 Anesthesia Stop: 0858    Procedure: COLONOSCOPY WITH ANESTHESIA (N/A ) Diagnosis:       History of colon polyps      (History of colon polyps [Z86.010])    Surgeons: Ton Cooper DO Provider: Michelle Thomas CRNA    Anesthesia Type: MAC ASA Status: 2          Anesthesia Type: MAC    Vitals  Vitals Value Taken Time   /66 02/14/22 0901   Temp     Pulse 67 02/14/22 0902   Resp     SpO2 98 % 02/14/22 0902   Vitals shown include unvalidated device data.        Post Anesthesia Care and Evaluation    Patient location during evaluation: PHASE II  Patient participation: complete - patient participated  Level of consciousness: awake  Pain management: adequate  Airway patency: patent  Anesthetic complications: No anesthetic complications    Cardiovascular status: acceptable  Respiratory status: acceptable  Hydration status: acceptable    Comments: Blood pressure 124/86, pulse 75, temperature 97 °F (36.1 °C), temperature source Temporal, resp. rate 18, height 162.6 cm (64\"), weight 75.3 kg (166 lb), SpO2 100 %      "

## 2022-02-14 NOTE — ANESTHESIA PREPROCEDURE EVALUATION
Anesthesia Evaluation     Patient summary reviewed   no history of anesthetic complications:  NPO Solid Status: > 8 hours  NPO Liquid Status: > 2 hours           Airway   Mallampati: II  TM distance: >3 FB  Neck ROM: full  No difficulty expected  Dental      Pulmonary    (+) asthma,  (-) sleep apnea, not a smoker  Cardiovascular   Exercise tolerance: good (4-7 METS)    (+) hypertension, hyperlipidemia,   (-) past MI, CAD, dysrhythmias, cardiac stents    ROS comment: LBBB  PFO    Neuro/Psych  (-) seizures, TIA, CVA  GI/Hepatic/Renal/Endo    (-) liver disease, no renal disease, diabetes    Musculoskeletal     Abdominal    Substance History      OB/GYN          Other                        Anesthesia Plan    ASA 2     MAC   (PFO precautions)    Anesthetic plan, all risks, benefits, and alternatives have been provided, discussed and informed consent has been obtained with: patient.        CODE STATUS:

## 2022-02-28 ENCOUNTER — HOSPITAL ENCOUNTER (OUTPATIENT)
Dept: WOMENS IMAGING | Age: 57
Discharge: HOME | End: 2022-02-28
Payer: COMMERCIAL

## 2022-02-28 DIAGNOSIS — Z12.31 ENCOUNTER FOR SCREENING MAMMOGRAM FOR BREAST CANCER: ICD-10-CM

## 2022-02-28 DIAGNOSIS — M85.852 OSTEOPENIA OF LEFT THIGH: ICD-10-CM

## 2022-02-28 PROCEDURE — 77080 DXA BONE DENSITY AXIAL: CPT

## 2022-02-28 PROCEDURE — 77067 SCR MAMMO BI INCL CAD: CPT

## 2022-06-16 DIAGNOSIS — I10 ESSENTIAL HYPERTENSION: ICD-10-CM

## 2022-06-16 DIAGNOSIS — M85.852 OSTEOPENIA OF LEFT THIGH: ICD-10-CM

## 2022-06-16 DIAGNOSIS — Z12.31 ENCOUNTER FOR SCREENING MAMMOGRAM FOR BREAST CANCER: ICD-10-CM

## 2022-06-16 DIAGNOSIS — E55.9 VITAMIN D DEFICIENCY: ICD-10-CM

## 2022-06-16 DIAGNOSIS — Z00.00 ANNUAL PHYSICAL EXAM: ICD-10-CM

## 2022-06-16 DIAGNOSIS — E78.00 PURE HYPERCHOLESTEROLEMIA: ICD-10-CM

## 2022-06-16 DIAGNOSIS — R73.01 IFG (IMPAIRED FASTING GLUCOSE): ICD-10-CM

## 2022-06-16 LAB
ALBUMIN SERPL-MCNC: 4.2 G/DL (ref 3.5–5.2)
ALP BLD-CCNC: 75 U/L (ref 35–104)
ALT SERPL-CCNC: 27 U/L (ref 5–33)
ANION GAP SERPL CALCULATED.3IONS-SCNC: 9 MMOL/L (ref 7–19)
AST SERPL-CCNC: 22 U/L (ref 5–32)
BILIRUB SERPL-MCNC: 0.5 MG/DL (ref 0.2–1.2)
BUN BLDV-MCNC: 22 MG/DL (ref 6–20)
CALCIUM SERPL-MCNC: 9.5 MG/DL (ref 8.6–10)
CHLORIDE BLD-SCNC: 102 MMOL/L (ref 98–111)
CHOLESTEROL, TOTAL: 194 MG/DL (ref 160–199)
CO2: 27 MMOL/L (ref 22–29)
CREAT SERPL-MCNC: 0.6 MG/DL (ref 0.5–0.9)
GFR AFRICAN AMERICAN: >59
GFR NON-AFRICAN AMERICAN: >60
GLUCOSE BLD-MCNC: 82 MG/DL (ref 74–109)
HDLC SERPL-MCNC: 72 MG/DL (ref 65–121)
LDL CHOLESTEROL CALCULATED: 106 MG/DL
POTASSIUM SERPL-SCNC: 4 MMOL/L (ref 3.5–5)
SODIUM BLD-SCNC: 138 MMOL/L (ref 136–145)
TOTAL PROTEIN: 7 G/DL (ref 6.6–8.7)
TRIGL SERPL-MCNC: 80 MG/DL (ref 0–149)
VITAMIN D 25-HYDROXY: 73.6 NG/ML

## 2022-06-20 ENCOUNTER — OFFICE VISIT (OUTPATIENT)
Dept: INTERNAL MEDICINE | Age: 57
End: 2022-06-20
Payer: COMMERCIAL

## 2022-06-20 VITALS
DIASTOLIC BLOOD PRESSURE: 72 MMHG | SYSTOLIC BLOOD PRESSURE: 108 MMHG | BODY MASS INDEX: 28.16 KG/M2 | OXYGEN SATURATION: 98 % | HEART RATE: 78 BPM | RESPIRATION RATE: 18 BRPM | HEIGHT: 65 IN | WEIGHT: 169 LBS

## 2022-06-20 DIAGNOSIS — E55.9 VITAMIN D DEFICIENCY: ICD-10-CM

## 2022-06-20 DIAGNOSIS — E78.00 PURE HYPERCHOLESTEROLEMIA: ICD-10-CM

## 2022-06-20 DIAGNOSIS — I10 ESSENTIAL HYPERTENSION: Primary | ICD-10-CM

## 2022-06-20 DIAGNOSIS — M85.852 OSTEOPENIA OF LEFT THIGH: ICD-10-CM

## 2022-06-20 DIAGNOSIS — R73.01 IFG (IMPAIRED FASTING GLUCOSE): ICD-10-CM

## 2022-06-20 PROCEDURE — 99214 OFFICE O/P EST MOD 30 MIN: CPT | Performed by: INTERNAL MEDICINE

## 2022-06-20 RX ORDER — ACYCLOVIR 800 MG/1
TABLET ORAL
Qty: 90 TABLET | Refills: 2 | Status: SHIPPED | OUTPATIENT
Start: 2022-06-20

## 2022-06-20 RX ORDER — CELECOXIB 100 MG/1
CAPSULE ORAL
COMMUNITY
Start: 2022-03-31 | End: 2022-06-20 | Stop reason: CLARIF

## 2022-06-20 RX ORDER — GABAPENTIN 100 MG/1
CAPSULE ORAL
COMMUNITY
Start: 2022-03-31 | End: 2022-06-20 | Stop reason: CLARIF

## 2022-06-20 RX ORDER — LISINOPRIL AND HYDROCHLOROTHIAZIDE 12.5; 1 MG/1; MG/1
1 TABLET ORAL DAILY
Qty: 90 TABLET | Refills: 1 | Status: SHIPPED | OUTPATIENT
Start: 2022-06-20

## 2022-06-20 RX ORDER — FLUTICASONE FUROATE AND VILANTEROL TRIFENATATE 100; 25 UG/1; UG/1
POWDER RESPIRATORY (INHALATION)
COMMUNITY
Start: 2022-03-31

## 2022-06-20 RX ORDER — METAXALONE 800 MG/1
TABLET ORAL
Qty: 30 TABLET | Refills: 2 | Status: SHIPPED | OUTPATIENT
Start: 2022-06-20

## 2022-06-20 RX ORDER — ATORVASTATIN CALCIUM 10 MG/1
TABLET, FILM COATED ORAL
Qty: 90 TABLET | Refills: 1 | Status: SHIPPED | OUTPATIENT
Start: 2022-06-20

## 2022-06-20 SDOH — ECONOMIC STABILITY: FOOD INSECURITY: WITHIN THE PAST 12 MONTHS, THE FOOD YOU BOUGHT JUST DIDN'T LAST AND YOU DIDN'T HAVE MONEY TO GET MORE.: NEVER TRUE

## 2022-06-20 SDOH — ECONOMIC STABILITY: FOOD INSECURITY: WITHIN THE PAST 12 MONTHS, YOU WORRIED THAT YOUR FOOD WOULD RUN OUT BEFORE YOU GOT MONEY TO BUY MORE.: NEVER TRUE

## 2022-06-20 ASSESSMENT — ENCOUNTER SYMPTOMS
SORE THROAT: 0
ABDOMINAL PAIN: 0
WHEEZING: 0
BACK PAIN: 1
CONSTIPATION: 0
COUGH: 0
CHEST TIGHTNESS: 0

## 2022-06-20 ASSESSMENT — SOCIAL DETERMINANTS OF HEALTH (SDOH): HOW HARD IS IT FOR YOU TO PAY FOR THE VERY BASICS LIKE FOOD, HOUSING, MEDICAL CARE, AND HEATING?: NOT HARD AT ALL

## 2022-06-20 ASSESSMENT — PATIENT HEALTH QUESTIONNAIRE - PHQ9
10. IF YOU CHECKED OFF ANY PROBLEMS, HOW DIFFICULT HAVE THESE PROBLEMS MADE IT FOR YOU TO DO YOUR WORK, TAKE CARE OF THINGS AT HOME, OR GET ALONG WITH OTHER PEOPLE: 0
9. THOUGHTS THAT YOU WOULD BE BETTER OFF DEAD, OR OF HURTING YOURSELF: 0
3. TROUBLE FALLING OR STAYING ASLEEP: 0
SUM OF ALL RESPONSES TO PHQ9 QUESTIONS 1 & 2: 0
7. TROUBLE CONCENTRATING ON THINGS, SUCH AS READING THE NEWSPAPER OR WATCHING TELEVISION: 0
5. POOR APPETITE OR OVEREATING: 0
2. FEELING DOWN, DEPRESSED OR HOPELESS: 0
SUM OF ALL RESPONSES TO PHQ QUESTIONS 1-9: 0
1. LITTLE INTEREST OR PLEASURE IN DOING THINGS: 0
6. FEELING BAD ABOUT YOURSELF - OR THAT YOU ARE A FAILURE OR HAVE LET YOURSELF OR YOUR FAMILY DOWN: 0
4. FEELING TIRED OR HAVING LITTLE ENERGY: 0
8. MOVING OR SPEAKING SO SLOWLY THAT OTHER PEOPLE COULD HAVE NOTICED. OR THE OPPOSITE, BEING SO FIGETY OR RESTLESS THAT YOU HAVE BEEN MOVING AROUND A LOT MORE THAN USUAL: 0

## 2022-07-20 ENCOUNTER — TELEPHONE (OUTPATIENT)
Dept: INTERNAL MEDICINE | Age: 57
End: 2022-07-20

## 2022-07-20 ENCOUNTER — TELEMEDICINE (OUTPATIENT)
Dept: INTERNAL MEDICINE | Age: 57
End: 2022-07-20
Payer: COMMERCIAL

## 2022-07-20 DIAGNOSIS — U07.1 ACUTE BRONCHITIS DUE TO 2019 NOVEL CORONAVIRUS: Primary | ICD-10-CM

## 2022-07-20 DIAGNOSIS — R05.9 COUGH: ICD-10-CM

## 2022-07-20 DIAGNOSIS — R09.89 CHEST CONGESTION: ICD-10-CM

## 2022-07-20 DIAGNOSIS — J20.8 ACUTE BRONCHITIS DUE TO 2019 NOVEL CORONAVIRUS: Primary | ICD-10-CM

## 2022-07-20 PROCEDURE — 99213 OFFICE O/P EST LOW 20 MIN: CPT | Performed by: INTERNAL MEDICINE

## 2022-07-20 RX ORDER — PREDNISONE 10 MG/1
10 TABLET ORAL 2 TIMES DAILY
Qty: 6 TABLET | Refills: 0 | Status: SHIPPED | OUTPATIENT
Start: 2022-07-20 | End: 2022-07-23

## 2022-07-20 RX ORDER — DEXTROMETHORPHAN HYDROBROMIDE AND PROMETHAZINE HYDROCHLORIDE 15; 6.25 MG/5ML; MG/5ML
2.5 SYRUP ORAL 4 TIMES DAILY PRN
Qty: 80 ML | Refills: 0 | Status: SHIPPED | OUTPATIENT
Start: 2022-07-20 | End: 2022-07-27

## 2022-07-20 ASSESSMENT — ENCOUNTER SYMPTOMS
CONSTIPATION: 0
SINUS PAIN: 1
CHEST TIGHTNESS: 0
ABDOMINAL PAIN: 0
SORE THROAT: 0
COUGH: 1
RHINORRHEA: 1
WHEEZING: 0

## 2022-07-20 NOTE — PROGRESS NOTES
Chief Complaint   Patient presents with    Positive For Covid-19     History of presenting illness:  Goran Benson is a61 y.o. female     TELEHEALTH EVALUATION -- Audio/Visual (During ZIRDQ-48 public health emergency)  Patient location-home  Pursuant to the emergency declaration under the [facility] [place] [address] [facility] authority and the [employer] Act, this Virtual  Visit was conducted, with patient's consent, to reduce the patient's risk of exposure to COVID-19 and provide continuity of care for an established patient. Services were provided through a video synchronous discussion virtually to substitute for in-person clinic visit.     Sick since yesterday  Congestion  Runny nose  Cough- dry  No fever/ chills  Has been using her albuterol and her BREO    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    Elevated transaminase level 11/07/2017    PFO (patent foramen ovale) 11/07/2017     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[address]     Essential hypertension     Gastroesophageal reflux disease without esophagitis     Herpes simplex     Hyperlipidemia     Irregular heart rhythm     Neck pain, chronic     PFO (patent foramen ovale)       Past Surgical History:   Procedure Laterality Date    BREAST SURGERY CHOLECYSTECTOMY      HYSTERECTOMY (CERVIX STATUS UNKNOWN)  2014    HYSTERECTOMY, VAGINAL      one ovary remians    OVARY REMOVAL Right 2014     Current Outpatient Medications   Medication Sig Dispense Refill    nirmatrelvir/ritonavir (PAXLOVID) 20 x 150 MG & 10 x 100MG Take 3 tablets (two 150 mg nirmatrelvir and one 100 mg ritonavir tablets) by mouth every 12 hours for 5 days. 30 tablet 0    predniSONE (DELTASONE) 10 MG tablet Take 1 tablet by mouth in the morning and 1 tablet before bedtime. Do all this for 3 days. 6 tablet 0    promethazine-dextromethorphan (PROMETHAZINE-DM) 6.25-15 MG/5ML syrup Take 2.5 mLs by mouth 4 times daily as needed for Cough 80 mL 0    BREO ELLIPTA 100-25 MCG/INH AEPB inhaler INHALE 1 PUFF BY MOUTH DAILY.  RINSE MOUTH AFTER USE      atorvastatin (LIPITOR) 10 MG tablet Take 1 tablet by mouth every day 90 tablet 1    metaxalone (SKELAXIN) 800 MG tablet TK 1/2 T PO Q 12 H PRN 30 tablet 2    acyclovir (ZOVIRAX) 800 MG tablet TK 1 T PO D 90 tablet 2    lisinopril-hydroCHLOROthiazide (PRINZIDE;ZESTORETIC) 10-12.5 MG per tablet Take 1 tablet by mouth daily 90 tablet 1    estradiol (ESTRACE) 1 MG tablet TAKE 1 TABLET BY MOUTH DAILY 90 tablet 3    albuterol (PROVENTIL) (2.5 MG/3ML) 0.083% nebulizer solution Take 3 mLs by nebulization every 6 hours as needed for Wheezing 120 each 1    albuterol sulfate HFA (PROAIR HFA) 108 (90 Base) MCG/ACT inhaler Inhale 2 puffs into the lungs every 6 hours as needed for Wheezing 1 Inhaler 3    cyclobenzaprine (FLEXERIL) 10 MG tablet Take 1 tablet by mouth nightly as needed for Muscle spasms 90 tablet 1    Cyanocobalamin (B-12) 5000 MCG SUBL Place under the tongue      vitamin D (CHOLECALCIFEROL) 1000 UNIT TABS tablet Take 2,000 Units by mouth daily       aspirin 81 MG tablet Take 81 mg by mouth daily      Cetirizine HCl (ZYRTEC PO) Take 10 mg by mouth daily       Multiple Vitamins-Minerals (CERTA-MARY WITH MINERALS ORAL) solution Take 15 mLs by mouth daily No current facility-administered medications for this visit. Allergies   Allergen Reactions    Azithromycin Other (See Comments)     Chest pain/irruglar heat beat        Lortab [Hydrocodone-Acetaminophen]     Meperidine Other (See Comments)     unsure    Morphine Hives     Social History     Tobacco Use    Smoking status: Never    Smokeless tobacco: Never   Substance Use Topics    Alcohol use: No     Alcohol/week: 0.0 standard drinks      Family History   Problem Relation Age of Onset    Coronary Art Dis Mother     Diabetes Mother     High Blood Pressure Mother     Other Mother         thalassemia minor       Review of Systems   Constitutional:  Positive for fatigue. Negative for chills and fever. HENT:  Positive for congestion, postnasal drip, rhinorrhea and sinus pain. Negative for ear pain, nosebleeds and sore throat. Respiratory:  Positive for cough. Negative for chest tightness and wheezing. Cardiovascular:  Negative for chest pain, palpitations and leg swelling. Gastrointestinal:  Negative for abdominal pain and constipation. Genitourinary:  Negative for dysuria and urgency. Musculoskeletal: Negative. Negative for arthralgias. Skin:  Negative for rash. Neurological:  Negative for dizziness and headaches. Psychiatric/Behavioral: Negative. There were no vitals filed for this visit. There is no height or weight on file to calculate BMI.   Patient-Reported Vitals 7/20/2022   Patient-Reported Weight 168   Patient-Reported Height 5'4\"   Patient-Reported Temperature 98.3        Physical Exam  PHYSICAL EXAMINATION:  [ INSTRUCTIONS:  \"[x]\" Indicates a positive item  \"[]\" Indicates a negative item  -- DELETE ALL ITEMS NOT EXAMINED]  [x] Alert  [x] Oriented to person/place/time    [x] No apparent distress  [] Toxic appearing    [] Face flushed appearing [] Sclera clear  [] Lips are cyanotic      [x] Breathing appears normal  [] Appears tachypneic      [] Rash on visible skin    [x] Cranial Nerves II-XII grossly intact    [x] Motor grossly intact in visible upper extremities    [x] Motor grossly intact in visible lower extremities    [x] Normal Mood  [] Anxious appearing    [] Depressed appearing  [] Confused appearing      [] Poor short term memory  [] Poor long term memory    [] OTHER:      Due to this being a TeleHealth encounter, evaluation of the following organ systems is limited: Vitals/Constitutional/EENT/Resp/CV/GI//MS/Neuro/Skin/Heme-Lymph-Imm. Lab Review   Orders Only on 06/16/2022   Component Date Value    Vit D, 25-Hydroxy 06/16/2022 73.6     Sodium 06/16/2022 138     Potassium 06/16/2022 4.0     Chloride 06/16/2022 102     CO2 06/16/2022 27     Anion Gap 06/16/2022 9     Glucose 06/16/2022 82     BUN 06/16/2022 22 (A)    Creatinine 06/16/2022 0.6     GFR Non- 06/16/2022 >60     GFR  06/16/2022 >59     Calcium 06/16/2022 9.5     Total Protein 06/16/2022 7.0     Albumin 06/16/2022 4.2     Total Bilirubin 06/16/2022 0.5     Alkaline Phosphatase 06/16/2022 75     ALT 06/16/2022 27     AST 06/16/2022 22     Cholesterol, Total 06/16/2022 194     Triglycerides 06/16/2022 80     HDL 06/16/2022 72     LDL Calculated 06/16/2022 106            ASSESSMENT/PLAN:      Acute bronchitis due to 2019 novel coronavirus    Cough    Chest congestion    Patient has known baseline reactive airways disease and she uses albuterol and Breo on daily basis  Continue these inhalers  Rx  -     nirmatrelvir/ritonavir (PAXLOVID) 20 x 150 MG & 10 x 100MG; Take 3 tablets (two 150 mg nirmatrelvir and one 100 mg ritonavir tablets) by mouth every 12 hours for 5 days. -     predniSONE (DELTASONE) 10 MG tablet; Take 1 tablet by mouth in the morning and 1 tablet before bedtime. Do all this for 3 days. -     promethazine-dextromethorphan (PROMETHAZINE-DM) 6.25-15 MG/5ML syrup;  Take 2.5 mLs by mouth 4 times daily as needed for Cough    Increase fluid intake    If sx not improving and

## 2022-07-30 ENCOUNTER — OFFICE VISIT (OUTPATIENT)
Age: 57
End: 2022-07-30
Payer: COMMERCIAL

## 2022-07-30 VITALS
WEIGHT: 172.8 LBS | HEART RATE: 85 BPM | SYSTOLIC BLOOD PRESSURE: 120 MMHG | DIASTOLIC BLOOD PRESSURE: 64 MMHG | TEMPERATURE: 97.9 F | BODY MASS INDEX: 29.2 KG/M2 | RESPIRATION RATE: 18 BRPM | OXYGEN SATURATION: 100 %

## 2022-07-30 DIAGNOSIS — J40 BRONCHITIS: Primary | ICD-10-CM

## 2022-07-30 PROCEDURE — 99213 OFFICE O/P EST LOW 20 MIN: CPT | Performed by: NURSE PRACTITIONER

## 2022-07-30 PROCEDURE — 96372 THER/PROPH/DIAG INJ SC/IM: CPT | Performed by: NURSE PRACTITIONER

## 2022-07-30 RX ORDER — DOXYCYCLINE HYCLATE 100 MG
100 TABLET ORAL 2 TIMES DAILY
Qty: 20 TABLET | Refills: 0 | Status: SHIPPED | OUTPATIENT
Start: 2022-07-30 | End: 2022-08-09

## 2022-07-30 RX ORDER — DEXAMETHASONE SODIUM PHOSPHATE 10 MG/ML
10 INJECTION INTRAMUSCULAR; INTRAVENOUS ONCE
Status: COMPLETED | OUTPATIENT
Start: 2022-07-30 | End: 2022-07-30

## 2022-07-30 RX ADMIN — DEXAMETHASONE SODIUM PHOSPHATE 10 MG: 10 INJECTION INTRAMUSCULAR; INTRAVENOUS at 13:36

## 2022-07-30 ASSESSMENT — ENCOUNTER SYMPTOMS
COLOR CHANGE: 0
BACK PAIN: 0
SHORTNESS OF BREATH: 1
RHINORRHEA: 0
PHOTOPHOBIA: 0
VOMITING: 0
NAUSEA: 0
VOICE CHANGE: 0
COUGH: 0

## 2022-07-30 NOTE — PROGRESS NOTES
[address]  [address] [address] [place] [address]  Dept: [phone]  Dept Fax: [phone]  Loc: [phone]    Helen Florence is a 64 y.o. female who presents today for her medical conditions/complaints as noted below. Helen Florence is c/o of Shortness of Breath, Chest Congestion (/), and Other (Pt reports that her lungs are \"burning\".)        HPI:     HPI   Chief Complaint   Patient presents with    Shortness of Breath    Chest Congestion           Other     Pt reports that her lungs are \"burning\". Patient presents today for evaluation of shortness of breath, congestion. She denies fever. She had covid 2 weeks ago. She has history of asthma. She has been doing neb treatments at home. Dr Bill Love had prescrived paxlovid and low dose steroid. She states she did feel better after this.        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[address]     Essential hypertension     Gastroesophageal reflux disease without esophagitis     Herpes simplex     Hyperlipidemia     Irregular heart rhythm     Neck pain, chronic     PFO (patent foramen ovale)       Past Surgical History:   Procedure Laterality Date    BREAST SURGERY      CHOLECYSTECTOMY      HYSTERECTOMY (CERVIX STATUS UNKNOWN)  2014    HYSTERECTOMY, VAGINAL      one ovary remians    OVARY REMOVAL Right 2014       Vitals 7/30/2022 6/20/2022 12/27/2021 6/24/2021 12/28/2020 3/86/0238   SYSTOLIC 343 161 [phone]   DIASTOLIC 64 72 68 70 70 70   Site - Left Upper Arm Left Upper Arm Left Upper Arm Left Upper Arm Left Upper Arm   Position - Sitting Sitting Sitting Sitting Sitting   Cuff Size - Large Adult Large Adult Large Adult Large Adult Large Adult   Pulse 85 78 72 71 74 72   Temp 97.9 - - - - -   Resp 18 18 18 18 18 18   SpO2 100 98 98 98 98 99   Weight 172 lb 12.8 oz 169 lb 167 lb 166 lb 175 lb 172 lb   Height - 5' 4.5\" 5' 4.5\" 5' 4\" 5' 4\" 5' 4\"   Body mass index - 28.56 kg/m2 28.22 kg/m2 28.49 kg/m2 30.04 kg/m2 29.52 kg/m2   Some recent data might be hidden       Family History   Problem Relation Age of Onset    Coronary Art Dis Mother     Diabetes Mother     High Blood Pressure Mother     Other Mother         thalassemia minor       Social History     Tobacco Use    Smoking status: Never    Smokeless tobacco: Never   Substance Use Topics    Alcohol use: No     Alcohol/week: 0.0 standard drinks      Current Outpatient Medications on File Prior to Visit   Medication Sig Dispense Refill    BREO ELLIPTA 100-25 MCG/INH AEPB inhaler INHALE 1 PUFF BY MOUTH DAILY.  RINSE MOUTH AFTER USE      atorvastatin (LIPITOR) 10 MG tablet Take 1 tablet by mouth every day 90 tablet 1    metaxalone (SKELAXIN) 800 MG tablet TK 1/2 T PO Q 12 H PRN 30 tablet 2    acyclovir (ZOVIRAX) 800 MG tablet TK 1 T PO D 90 tablet 2    lisinopril-hydroCHLOROthiazide (PRINZIDE;ZESTORETIC) 10-12.5 MG per tablet Take 1 tablet by mouth daily 90 tablet 1    estradiol (ESTRACE) 1 MG tablet TAKE 1 TABLET BY MOUTH DAILY 90 tablet 3    albuterol (PROVENTIL) (2.5 MG/3ML) 0.083% nebulizer solution Take 3 mLs by nebulization every 6 hours as needed for Wheezing 120 each 1    albuterol sulfate HFA (PROAIR HFA) 108 (90 Base) MCG/ACT inhaler Inhale 2 puffs into the lungs every 6 hours as needed for Wheezing 1 Inhaler 3    cyclobenzaprine (FLEXERIL) 10 MG tablet Take 1 tablet by mouth nightly as needed for Muscle spasms 90 tablet 1    Cyanocobalamin (B-12) 5000 MCG SUBL Place under the tongue      vitamin D (CHOLECALCIFEROL) 1000 UNIT TABS tablet Take 2,000 Units by mouth daily       aspirin 81 MG tablet Take 81 mg by mouth daily      Cetirizine HCl (ZYRTEC PO) Take 10 mg by mouth daily       Multiple Vitamins-Minerals (CERTA-MARY WITH MINERALS ORAL) solution Take 15 mLs by mouth daily       No current facility-administered medications on file prior to visit. Allergies   Allergen Reactions    Azithromycin Other (See Comments)     Chest pain/irruglar heat beat        Lortab [Hydrocodone-Acetaminophen]     Meperidine Other (See Comments)     unsure    Morphine Hives       Health Maintenance   Topic Date Due    COVID-19 Vaccine (4 - Booster for Moderna series) 03/11/2022    DTaP/Tdap/Td vaccine (1 - Tdap) 08/10/2022 (Originally 10/21/1984)    Shingles vaccine (1 of 2) 06/20/2023 (Originally 10/21/2015)    Flu vaccine (1) 09/01/2022    A1C test (Diabetic or Prediabetic)  12/20/2022    Lipids  06/16/2023    Depression Monitoring  06/20/2023    Breast cancer screen  02/28/2024    Colorectal Cancer Screen  02/14/2027    Hepatitis C screen  Completed    HIV screen  Completed    Hepatitis A vaccine  Aged Out    Hepatitis B vaccine  Aged Out    Hib vaccine  Aged Out    Meningococcal (ACWY) vaccine  Aged Out    Pneumococcal 0-64 years Vaccine  Aged Out       Subjective:      Review of Systems   Constitutional:  Negative for chills and fever. HENT:  Positive for congestion. Negative for ear pain, hearing loss, rhinorrhea and voice change. Eyes:  Negative for photophobia and visual disturbance. Respiratory:  Positive for shortness of breath. Negative for cough. Cardiovascular:  Negative for chest pain and palpitations. Gastrointestinal:  Negative for nausea and vomiting. Endocrine: Negative. Negative for cold intolerance and heat intolerance. Genitourinary:  Negative for difficulty urinating and flank pain. Musculoskeletal:  Negative for back pain and neck pain. Skin:  Negative for color change and rash. Allergic/Immunologic: Negative for environmental allergies and food allergies. Neurological:  Negative for dizziness, speech difficulty and headaches. Hematological:  Does not bruise/bleed easily. Psychiatric/Behavioral:  Negative for sleep disturbance and suicidal ideas.       Objective: Physical Exam  Vitals and nursing note reviewed. Constitutional:       Appearance: She is well-developed. HENT:      Head: Atraumatic. Right Ear: External ear normal.      Left Ear: External ear normal.      Nose: Nose normal.   Eyes:      Conjunctiva/sclera: Conjunctivae normal.      Pupils: Pupils are equal, round, and reactive to light. Cardiovascular:      Rate and Rhythm: Normal rate and regular rhythm. Heart sounds: Normal heart sounds, S1 normal and S2 normal.   Pulmonary:      Effort: Pulmonary effort is normal.      Breath sounds: Normal breath sounds. Abdominal:      General: Bowel sounds are normal.      Palpations: Abdomen is soft. Musculoskeletal:         General: Normal range of motion. Cervical back: Normal range of motion and neck supple. Skin:     General: Skin is warm and dry. Neurological:      Mental Status: She is alert and oriented to person, place, and time. Psychiatric:         Behavior: Behavior normal.     /64   Pulse 85   Temp 97.9 °F (36.6 °C) (Temporal)   Resp 18   Wt 172 lb 12.8 oz (78.4 kg)   SpO2 100%   BMI 29.20 kg/m²     Assessment:       Diagnosis Orders   1. Bronchitis              Plan:     Follow-up if symptoms worsening or fail to improve. Begin doxycycline as directed. Steroid injection today. Continue nebulizer treatment every 4-6 hours. PDMP Monitoring:    Last PDMP [place] as Reviewed [facility]):  Review User Review Instant Review Result            Urine Drug Screenings (1 yr)    No resulted procedures found. Medication Contract and Consent for Opioid Use Documents Filed        No documents found                     Patient given educational materials -see patient instructions. Discussed use, benefit, and side effects of prescribed medications. All patient questions answered. Pt voiced understanding. Reviewed health maintenance. Instructed to continue currentmedications, diet and exercise.   Patient agreed with treatment plan. Follow up as directed. MEDICATIONS:  Orders Placed This Encounter   Medications    dexamethasone (DECADRON) injection 10 mg    doxycycline hyclate (VIBRA-TABS) 100 MG tablet     Sig: Take 1 tablet by mouth in the morning and 1 tablet before bedtime. Do all this for 10 days. Dispense:  20 tablet     Refill:  0         ORDERS:  No orders of the defined types were placed in this encounter. Follow-up:  Return if symptoms worsen or fail to improve. PATIENT INSTRUCTIONS:  Patient Instructions   Follow-up if symptoms worsening or fail to improve. Begin doxycycline as directed. Steroid injection today. Continue nebulizer treatment every 4-6 hours. Electronically signed by JENNIFER Paz on 7/30/2022 at 1:33 PM    EMR Dragon/transcription disclaimer:  Much of thisencounter note is electronic transcription/translation of spoken language to printed texts. The electronic translation of spoken language may be erroneous, or at times, nonsensical words or phrases may be inadvertentlytranscribed.   Although I have reviewed the note for such errors, some may still exist.

## 2022-07-30 NOTE — PATIENT INSTRUCTIONS
Follow-up if symptoms worsening or fail to improve. Begin doxycycline as directed. Steroid injection today. Continue nebulizer treatment every 4-6 hours.

## 2022-09-01 ENCOUNTER — HOSPITAL ENCOUNTER (EMERGENCY)
Facility: HOSPITAL | Age: 57
Discharge: HOME | End: 2022-09-01
Admitting: EMERGENCY MEDICINE

## 2022-09-01 ENCOUNTER — APPOINTMENT (OUTPATIENT)
Dept: CT IMAGING | Facility: HOSPITAL | Age: 57
End: 2022-09-01

## 2022-09-01 ENCOUNTER — APPOINTMENT (OUTPATIENT)
Dept: GENERAL RADIOLOGY | Facility: HOSPITAL | Age: 57
End: 2022-09-01

## 2022-09-01 VITALS
OXYGEN SATURATION: 97 % | BODY MASS INDEX: 29.02 KG/M2 | RESPIRATION RATE: 20 BRPM | HEIGHT: 64 IN | WEIGHT: 170 LBS | HEART RATE: 64 BPM | TEMPERATURE: 98.2 F | SYSTOLIC BLOOD PRESSURE: 126 MMHG | DIASTOLIC BLOOD PRESSURE: 78 MMHG

## 2022-09-01 DIAGNOSIS — S82.65XA CLOSED NONDISPLACED FRACTURE OF LATERAL MALLEOLUS OF LEFT FIBULA, INITIAL ENCOUNTER: ICD-10-CM

## 2022-09-01 DIAGNOSIS — S92.002A CLOSED DISPLACED FRACTURE OF LEFT CALCANEUS, UNSPECIFIED PORTION OF CALCANEUS, INITIAL ENCOUNTER: Primary | ICD-10-CM

## 2022-09-01 PROCEDURE — 73700 CT LOWER EXTREMITY W/O DYE: CPT

## 2022-09-01 PROCEDURE — 63710000001 ONDANSETRON ODT 4 MG TABLET DISPERSIBLE: Performed by: EMERGENCY MEDICINE

## 2022-09-01 PROCEDURE — 73610 X-RAY EXAM OF ANKLE: CPT

## 2022-09-01 PROCEDURE — 73630 X-RAY EXAM OF FOOT: CPT

## 2022-09-01 PROCEDURE — 96372 THER/PROPH/DIAG INJ SC/IM: CPT

## 2022-09-01 PROCEDURE — 99283 EMERGENCY DEPT VISIT LOW MDM: CPT

## 2022-09-01 PROCEDURE — 0 HYDROMORPHONE 1 MG/ML SOLUTION: Performed by: EMERGENCY MEDICINE

## 2022-09-01 RX ORDER — ONDANSETRON 4 MG/1
4 TABLET, ORALLY DISINTEGRATING ORAL ONCE
Status: COMPLETED | OUTPATIENT
Start: 2022-09-01 | End: 2022-09-01

## 2022-09-01 RX ORDER — OXYCODONE AND ACETAMINOPHEN 7.5; 325 MG/1; MG/1
1 TABLET ORAL EVERY 4 HOURS PRN
Qty: 10 TABLET | Refills: 0 | Status: SHIPPED | OUTPATIENT
Start: 2022-09-01

## 2022-09-01 RX ADMIN — HYDROMORPHONE HYDROCHLORIDE 1 MG: 1 INJECTION, SOLUTION INTRAMUSCULAR; INTRAVENOUS; SUBCUTANEOUS at 18:43

## 2022-09-01 RX ADMIN — ONDANSETRON 4 MG: 4 TABLET, ORALLY DISINTEGRATING ORAL at 16:11

## 2022-09-01 RX ADMIN — HYDROMORPHONE HYDROCHLORIDE 1 MG: 1 INJECTION, SOLUTION INTRAMUSCULAR; INTRAVENOUS; SUBCUTANEOUS at 16:11

## 2022-09-01 NOTE — ED PROVIDER NOTES
Subjective   History of Present Illness    Patient is a pleasant 56-year-old female with chief complaint of severe left ankle pain.  The patient describes that she was riding her horse.  She thinks the horse may have been stung by a bee.  She was lifted up in her saddle but then came back down with her left ankle still in the stirrup.  The patient complained immediate pain while her foot was still the start.  She denies any other impact.  She denies that being thrown off the horse.  She denies head or neck injury.  She eventually was able to drive home from [place] [facility].  This injury occurred about 1130 this morning.  She then came to the ER later.  Patient last ate at 8:00 this morning.  She denies any loss of sensation.  She had a previous fracture to her left ankle back in 2004 but denied any further surgical intervention.    Review of Systems   All other systems reviewed and are negative.      Past Medical History:   Diagnosis Date   • Asthma    • Breast lump    • Endometriosis    • Genital herpes    • Hyperlipidemia    • Hypertension    • Left bundle branch block    • PFO with atrial septal aneurysm    • PONV (postoperative nausea and vomiting)        Allergies   Allergen Reactions   • Azithromycin Other (See Comments)     Chest pain   • Lortab [Hydrocodone-Acetaminophen] Itching   • Morphine Hives       Past Surgical History:   Procedure Laterality Date   • BREAST SURGERY     • CHOLECYSTECTOMY     • COLONOSCOPY  2000    normal   • COLONOSCOPY N/A 2/3/2017    Procedure: COLONOSCOPY WITH ANESTHESIA;  Surgeon: Ton Cooper DO;  Location: [facility] ENDOSCOPY;  Service:    • COLONOSCOPY N/A 2/14/2022    Procedure: COLONOSCOPY WITH ANESTHESIA;  Surgeon: Ton Cooper DO;  Location: [facility] ENDOSCOPY;  Service: Gastroenterology;  Laterality: N/A;  preo; hx of polyps  postop; normal   PCP Huyen Ramirez    • HYSTERECTOMY      partial       Family History   Problem Relation Age of Onset   • Heart disease  Mother    • Heart disease Father    • No Known Problems Sister    • Colon cancer Neg Hx    • Colon polyps Neg Hx    • Esophageal cancer Neg Hx    • Liver cancer Neg Hx    • Liver disease Neg Hx    • Rectal cancer Neg Hx    • Stomach cancer Neg Hx        Social History     Socioeconomic History   • Marital status:    Tobacco Use   • Smoking status: Never Smoker   • Smokeless tobacco: Never Used   Vaping Use   • Vaping Use: Never used   Substance and Sexual Activity   • Alcohol use: Yes     Comment: Socially   • Drug use: No   • Sexual activity: Defer       Prior to Admission medications    Medication Sig Start Date End Date Taking? Authorizing Provider   acyclovir (ZOVIRAX) 200 MG capsule Take  by mouth Every 4 (Four) Hours While Awake.    Rea Mcdonald MD   AMRIX 15 MG 24 hr capsule  11/15/16   Rea Mcdonald MD   aspirin 81 MG EC tablet Take 81 mg by mouth Daily.    Rea Mcdonald MD   atorvastatin (LIPITOR) 10 MG tablet TK 1 T PO QD 11/20/16   Rea Mcdonald MD   cetirizine (zyrTEC) 10 MG tablet Take 10 mg by mouth Daily.    Rea Mcdonald MD   Clenpiq 10-3.5-12 MG-GM -GM/160ML solution Take 160 mL by mouth Take As Directed. 2/1/22   Sandeep Shaikh APRN   cyclobenzaprine (FLEXERIL) 10 MG tablet Take 10 mg by mouth 3 (Three) Times a Day As Needed. 7/24/21   Rea Mcdonald MD   cyclobenzaprine (FLEXERIL) 10 MG tablet Take 10 mg by mouth 3 (Three) Times a Day As Needed for Muscle Spasms.    Rea Mcdonald MD   estradiol (ESTRACE) 0.5 MG tablet Take  by mouth Daily.    Rea Mcdonald MD   folic acid-vit B6-vit B12 (FOLTABS) 0.8-10-0.115 MG tablet tablet Take  by mouth Daily.    Rea Mcdonald MD   lisinopril-hydrochlorothiazide (PRINZIDE,ZESTORETIC) 10-12.5 MG per tablet Take 1 tablet by mouth Daily. 1/3/22   Rea Mcdnoald MD   metaxalone (SKELAXIN) 800 MG tablet TK 1/2 T PO Q 12 H PRN 9/25/16   Rea Mcdonald MD   Multiple  "Vitamin (MULTI VITAMIN PO) Take  by mouth Daily.    Rea Mcdonald MD   pantoprazole (PROTONIX) 40 MG EC tablet Take 40 mg by mouth Daily.    Rea Mcdonald MD   vitamin D3 125 MCG (5000 UT) capsule capsule Take 5,000 Units by mouth Daily.    Rea Mcdonald MD       Medications   HYDROmorphone (DILAUDID) injection 1 mg (has no administration in time range)   HYDROmorphone (DILAUDID) injection 1 mg (1 mg Intramuscular Given 9/1/22 1611)   ondansetron ODT (ZOFRAN-ODT) disintegrating tablet 4 mg (4 mg Oral Given 9/1/22 1611)       /82 (BP Location: Right arm, Patient Position: Sitting)   Pulse 76   Temp 98.8 °F (37.1 °C) (Oral)   Resp 18   Ht 162.6 cm (64\")   Wt 77.1 kg (170 lb)   SpO2 97%   BMI 29.18 kg/m²       Objective   Physical Exam  Vitals and nursing note reviewed.   Constitutional:       General: She is not in acute distress.     Appearance: Normal appearance. She is well-developed. She is not diaphoretic.   HENT:      Head: Normocephalic and atraumatic.   Eyes:      Conjunctiva/sclera: Conjunctivae normal.      Pupils: Pupils are equal, round, and reactive to light.   Neck:      Trachea: No tracheal deviation.   Cardiovascular:      Rate and Rhythm: Normal rate and regular rhythm.      Heart sounds: Normal heart sounds. No murmur heard.  Pulmonary:      Effort: Pulmonary effort is normal.      Breath sounds: Normal breath sounds.   Abdominal:      General: Bowel sounds are normal. There is no distension.      Palpations: Abdomen is soft. There is no mass.      Tenderness: There is no abdominal tenderness. There is no guarding or rebound.   Musculoskeletal:         General: Swelling, tenderness and signs of injury present.      Cervical back: Normal range of motion and neck supple.      Right hip: Normal.      Left hip: Normal.      Right knee: Normal.      Left knee: Normal.      Right ankle: Normal.      Left ankle: Swelling and ecchymosis present. Tenderness present over " the lateral malleolus, posterior TF ligament and proximal fibula. Decreased range of motion. Normal pulse.      Left Achilles Tendon: Tenderness present. No defects. Chopra's test negative.      Right foot: Normal.      Left foot: Decreased range of motion. Normal capillary refill. Tenderness and bony tenderness present. Normal pulse.        Legs:    Skin:     General: Skin is warm and dry.   Neurological:      General: No focal deficit present.      Mental Status: She is alert and oriented to person, place, and time.   Psychiatric:         Mood and Affect: Mood normal.         Behavior: Behavior normal.         Thought Content: Thought content normal.         Judgment: Judgment normal.         Procedures         Lab Results (last 24 hours)     ** No results found for the last 24 hours. **          XR Ankle 3+ View Left    Result Date: 9/1/2022  Narrative: EXAMINATION: XR ANKLE 3+ VW LEFT- 9/1/2022 4:32 PM CDT  HISTORY: ankle injury. Horse stirrup.  REPORT: 3 views of the left ankle were obtained.  COMPARISON: There are no correlative imaging studies for comparison.  There is an acute mildly displaced obliquely oriented fracture oriented through the posterior superior aspect of the posterior calcaneus, with approximately 2 to 3 mm of displacement. No involvement of the subtalar joint is seen. The talus is intact. The distal tibia appears intact. There is also at minimally displaced transverse fracture through the tip of the lateral malleolus. Overlying soft tissue swelling is noted greater laterally.      Impression: Acute closed mildly displaced fractures of the posterior superior calcaneus and tip of the lateral malleolus as described. This report was finalized on 09/01/2022 16:34 by Dr. Abdullahi Culver MD.    XR Foot 3+ View Left    Result Date: 9/1/2022  Narrative: EXAMINATION: XR FOOT 3+ VW LEFT- 9/1/2022 4:27 PM CDT  HISTORY: foot injury. Horse stirrup.  REPORT: 3 views of the left foot were obtained.   COMPARISON: There are no correlative imaging studies for comparison.  There is an acute mildly displaced obliquely oriented fracture through the posterior superior margin of the calcaneus, the degree of displacement measures approximately 2 to 3 mm. An inferior calcaneal enthesophyte is present. There is posterior calcaneal spurring at the level of the fracture involving the posterior cortex. There is mild degenerative overgrowth of the first metatarsal head.      Impression: Acute mildly displaced obliquely oriented fracture involving the posterior and superior cortex of the calcaneus. The superior cortex fracture is at the level of the posterior process of the talus, no talar fracture is identified. This report was finalized on 09/01/2022 16:32 by Dr. Abdullahi Culver MD.    CT Lower Extremity Left Without Contrast    Result Date: 9/1/2022  Narrative: EXAMINATION: CT LOWER EXTREMITY LEFT WO CONTRAST-   9/1/2022 5:26 PM CDT  HISTORY: Fell from a horse. Left foot fracture evaluation.  In order to have a CT radiation dose as low as reasonably achievable Automated Exposure Control was utilized for adjustment of the mA and/or KV according to patient size.  DLP in mGycm= 167.  Noncontrast CT imaging of the left foot and ankle. Axial, sagittal, and coronal sequences.  Comparison is made with left foot and ankle radiographs from earlier today at 3:58 PM.  Oblique simple mildly distracted fracture at the posterior superior calcaneus. This involves the Achilles tendon attachment. Maximal distraction of 4-5 mm.  The anterior margin of the fracture line extends to the margin of the subtalar joint. 35 x 20 x 14 mm free bone fragment. No subtalar articulation involvement.  Anterior process of the calcaneus is intact.  There is mild to moderate inferior calcaneal spurring.  Intact talus, navicular, and cuboid.  The midfoot joints are intact.  Summary: 1. Simple oblique fracture of the posterior superior calcaneus with the  Achilles tendon attachment existing as a large free fragment. 2. No subtalar joint involvement.            This report was finalized on 09/01/2022 17:48 by Dr. Rich Pimentel MD.      ED Course  ED Course as of 09/01/22 1842   Thu Sep 01, 2022   1722 I did speak with Dr. Aguilar, orthopedic surgeon on-call.  He does request the patient complete a CT of her lower extremity and be placed in a splint with extensive padding.  He request the patient see Dr. Peterson, orthopedic surgeon on-call as outpatient for this.  This has been relayed to the patient. [TK]   1753 I have educated the patient the CT test results.  We have placed her in an extensively padded posterior stirrup splint.  She has a pair crutches at home.  Patient advised to follow-up Dr. Aayush Peterson, the podiatrist associated with Dr. Aguilar's group.  Patient advised to rest, ice, compress, elevate in the meantime.  Cath report has been completed.  Short course of pain medication will prescribe.  Patient voiced understanding she feels comfortable be discharged this time.  Strict return precaution advised. [TK]   1842 Patient splint was placed by the tech.  I have reassessed her and she remains neurovascularly intact.  She will follow-up with Dr. Peterson.  Strict return precaution advised. [TK]      ED Course User Index  [TK] Néstor Lindsay PA          [facility]    Final diagnoses:   Closed displaced fracture of left calcaneus, unspecified portion of calcaneus, initial encounter   Closed nondisplaced fracture of lateral malleolus of left fibula, initial encounter          éNstor Lindsay PA  09/01/22 1842

## 2022-12-13 DIAGNOSIS — E55.9 VITAMIN D DEFICIENCY: ICD-10-CM

## 2022-12-13 DIAGNOSIS — E78.00 PURE HYPERCHOLESTEROLEMIA: ICD-10-CM

## 2022-12-13 DIAGNOSIS — M85.852 OSTEOPENIA OF LEFT THIGH: ICD-10-CM

## 2022-12-13 DIAGNOSIS — I10 ESSENTIAL HYPERTENSION: ICD-10-CM

## 2022-12-13 DIAGNOSIS — R73.01 IFG (IMPAIRED FASTING GLUCOSE): ICD-10-CM

## 2022-12-13 LAB
ALBUMIN SERPL-MCNC: 4.2 G/DL (ref 3.5–5.2)
ALP BLD-CCNC: 100 U/L (ref 35–104)
ALT SERPL-CCNC: 29 U/L (ref 5–33)
ANION GAP SERPL CALCULATED.3IONS-SCNC: 10 MMOL/L (ref 7–19)
AST SERPL-CCNC: 22 U/L (ref 5–32)
BACTERIA: ABNORMAL /HPF
BASOPHILS ABSOLUTE: 0 K/UL (ref 0–0.2)
BASOPHILS RELATIVE PERCENT: 0.7 % (ref 0–1)
BILIRUB SERPL-MCNC: 0.7 MG/DL (ref 0.2–1.2)
BILIRUBIN URINE: NEGATIVE
BLOOD, URINE: NEGATIVE
BUN BLDV-MCNC: 20 MG/DL (ref 6–20)
CALCIUM SERPL-MCNC: 9.9 MG/DL (ref 8.6–10)
CHLORIDE BLD-SCNC: 102 MMOL/L (ref 98–111)
CHOLESTEROL, TOTAL: 215 MG/DL (ref 160–199)
CLARITY: ABNORMAL
CO2: 28 MMOL/L (ref 22–29)
COLOR: YELLOW
CREAT SERPL-MCNC: 0.6 MG/DL (ref 0.5–0.9)
CRYSTALS, UA: ABNORMAL /HPF
EOSINOPHILS ABSOLUTE: 0.3 K/UL (ref 0–0.6)
EOSINOPHILS RELATIVE PERCENT: 4.6 % (ref 0–5)
EPITHELIAL CELLS, UA: 9 /HPF (ref 0–5)
GFR SERPL CREATININE-BSD FRML MDRD: >60 ML/MIN/{1.73_M2}
GLUCOSE BLD-MCNC: 91 MG/DL (ref 74–109)
GLUCOSE URINE: NEGATIVE MG/DL
HCT VFR BLD CALC: 40.5 % (ref 37–47)
HDLC SERPL-MCNC: 72 MG/DL (ref 65–121)
HEMOGLOBIN: 12.6 G/DL (ref 12–16)
HYALINE CASTS: 4 /HPF (ref 0–8)
IMMATURE GRANULOCYTES #: 0 K/UL
KETONES, URINE: NEGATIVE MG/DL
LDL CHOLESTEROL CALCULATED: 114 MG/DL
LEUKOCYTE ESTERASE, URINE: ABNORMAL
LYMPHOCYTES ABSOLUTE: 2.5 K/UL (ref 1.1–4.5)
LYMPHOCYTES RELATIVE PERCENT: 42.3 % (ref 20–40)
MCH RBC QN AUTO: 22.1 PG (ref 27–31)
MCHC RBC AUTO-ENTMCNC: 31.1 G/DL (ref 33–37)
MCV RBC AUTO: 71.1 FL (ref 81–99)
MONOCYTES ABSOLUTE: 0.5 K/UL (ref 0–0.9)
MONOCYTES RELATIVE PERCENT: 8.9 % (ref 0–10)
NEUTROPHILS ABSOLUTE: 2.5 K/UL (ref 1.5–7.5)
NEUTROPHILS RELATIVE PERCENT: 43 % (ref 50–65)
NITRITE, URINE: NEGATIVE
PDW BLD-RTO: 15.4 % (ref 11.5–14.5)
PH UA: 5.5 (ref 5–8)
PLATELET # BLD: 293 K/UL (ref 130–400)
PMV BLD AUTO: 11.4 FL (ref 9.4–12.3)
POTASSIUM SERPL-SCNC: 4.3 MMOL/L (ref 3.5–5)
PROTEIN UA: NEGATIVE MG/DL
RBC # BLD: 5.7 M/UL (ref 4.2–5.4)
RBC UA: 1 /HPF (ref 0–4)
SODIUM BLD-SCNC: 140 MMOL/L (ref 136–145)
SPECIFIC GRAVITY UA: 1.03 (ref 1–1.03)
T4 FREE: 1.08 NG/DL (ref 0.93–1.7)
TOTAL PROTEIN: 6.9 G/DL (ref 6.6–8.7)
TRIGL SERPL-MCNC: 144 MG/DL (ref 0–149)
TSH SERPL DL<=0.05 MIU/L-ACNC: 4.09 UIU/ML (ref 0.27–4.2)
UROBILINOGEN, URINE: 0.2 E.U./DL
VITAMIN D 25-HYDROXY: 56.3 NG/ML
WBC # BLD: 5.8 K/UL (ref 4.8–10.8)
WBC UA: 5 /HPF (ref 0–5)

## 2022-12-20 ENCOUNTER — OFFICE VISIT (OUTPATIENT)
Dept: INTERNAL MEDICINE | Age: 57
End: 2022-12-20
Payer: COMMERCIAL

## 2022-12-20 VITALS
RESPIRATION RATE: 18 BRPM | OXYGEN SATURATION: 97 % | BODY MASS INDEX: 29.16 KG/M2 | SYSTOLIC BLOOD PRESSURE: 122 MMHG | WEIGHT: 175 LBS | DIASTOLIC BLOOD PRESSURE: 76 MMHG | HEART RATE: 52 BPM | HEIGHT: 65 IN

## 2022-12-20 DIAGNOSIS — Z12.39 ENCOUNTER FOR OTHER SCREENING FOR MALIGNANT NEOPLASM OF BREAST: ICD-10-CM

## 2022-12-20 DIAGNOSIS — M85.852 OSTEOPENIA OF LEFT THIGH: ICD-10-CM

## 2022-12-20 DIAGNOSIS — E55.9 VITAMIN D DEFICIENCY: ICD-10-CM

## 2022-12-20 DIAGNOSIS — Z00.00 ANNUAL PHYSICAL EXAM: Primary | ICD-10-CM

## 2022-12-20 DIAGNOSIS — E78.00 PURE HYPERCHOLESTEROLEMIA: ICD-10-CM

## 2022-12-20 DIAGNOSIS — R73.01 IFG (IMPAIRED FASTING GLUCOSE): ICD-10-CM

## 2022-12-20 DIAGNOSIS — I10 ESSENTIAL HYPERTENSION: ICD-10-CM

## 2022-12-20 PROCEDURE — 99396 PREV VISIT EST AGE 40-64: CPT | Performed by: INTERNAL MEDICINE

## 2022-12-20 PROCEDURE — 3078F DIAST BP <80 MM HG: CPT | Performed by: INTERNAL MEDICINE

## 2022-12-20 PROCEDURE — 3074F SYST BP LT 130 MM HG: CPT | Performed by: INTERNAL MEDICINE

## 2022-12-20 RX ORDER — ACYCLOVIR 800 MG/1
TABLET ORAL
Qty: 90 TABLET | Refills: 2 | Status: SHIPPED | OUTPATIENT
Start: 2022-12-20

## 2022-12-20 RX ORDER — LISINOPRIL AND HYDROCHLOROTHIAZIDE 12.5; 1 MG/1; MG/1
1 TABLET ORAL DAILY
Qty: 90 TABLET | Refills: 1 | Status: SHIPPED | OUTPATIENT
Start: 2022-12-20

## 2022-12-20 RX ORDER — ATORVASTATIN CALCIUM 10 MG/1
TABLET, FILM COATED ORAL
Qty: 90 TABLET | Refills: 1 | Status: SHIPPED | OUTPATIENT
Start: 2022-12-20

## 2022-12-20 ASSESSMENT — ENCOUNTER SYMPTOMS
CHEST TIGHTNESS: 0
SORE THROAT: 0
CONSTIPATION: 0
WHEEZING: 0
COUGH: 0
ABDOMINAL PAIN: 0

## 2022-12-20 NOTE — PROGRESS NOTES
Chief Complaint   Patient presents with    Annual Exam     History of presenting illness:  Meggan Strickland is a64 y.o. female who presents today for follow up on her chronic medical conditions as noted below. 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    Elevated transaminase level 11/07/2017    PFO (patent foramen ovale) 11/07/2017     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[address]     Essential hypertension     Gastroesophageal reflux disease without esophagitis     Herpes simplex     Hyperlipidemia     Irregular heart rhythm     Neck pain, chronic     PFO (patent foramen ovale)       Past Surgical History:   Procedure Laterality Date    BREAST SURGERY      CHOLECYSTECTOMY      HYSTERECTOMY (CERVIX STATUS UNKNOWN)  2014    HYSTERECTOMY, VAGINAL      one ovary remians    OVARY REMOVAL Right 2014     Current Outpatient Medications   Medication Sig Dispense Refill    lisinopril-hydroCHLOROthiazide (PRINZIDE;ZESTORETIC) 10-12.5 MG per tablet Take 1 tablet by mouth daily 90 tablet 1    atorvastatin (LIPITOR) 10 MG tablet Take 1 tablet by mouth every day 90 tablet 1    acyclovir (ZOVIRAX) 800 MG tablet TK 1 T PO D 90 tablet 2    BREO ELLIPTA 100-25 MCG/INH AEPB inhaler INHALE 1 PUFF BY MOUTH DAILY.  RINSE MOUTH AFTER USE      metaxalone (SKELAXIN) 800 MG tablet TK 1/2 T PO Q 12 H PRN 30 tablet 2 estradiol (ESTRACE) 1 MG tablet TAKE 1 TABLET BY MOUTH DAILY 90 tablet 3    albuterol (PROVENTIL) (2.5 MG/3ML) 0.083% nebulizer solution Take 3 mLs by nebulization every 6 hours as needed for Wheezing 120 each 1    albuterol sulfate HFA (PROAIR HFA) 108 (90 Base) MCG/ACT inhaler Inhale 2 puffs into the lungs every 6 hours as needed for Wheezing 1 Inhaler 3    cyclobenzaprine (FLEXERIL) 10 MG tablet Take 1 tablet by mouth nightly as needed for Muscle spasms 90 tablet 1    Cyanocobalamin (B-12) 5000 MCG SUBL Place under the tongue      vitamin D (CHOLECALCIFEROL) 1000 UNIT TABS tablet Take 2,000 Units by mouth daily       aspirin 81 MG tablet Take 81 mg by mouth daily      Cetirizine HCl (ZYRTEC PO) Take 10 mg by mouth daily       Multiple Vitamins-Minerals (CERTA-MARY WITH MINERALS ORAL) solution Take 15 mLs by mouth daily       No current facility-administered medications for this visit. Allergies   Allergen Reactions    Azithromycin Other (See Comments)     Chest pain/irruglar heat beat        Lortab [Hydrocodone-Acetaminophen]     Meperidine Other (See Comments)     unsure    Morphine Hives     Social History     Tobacco Use    Smoking status: Never    Smokeless tobacco: Never   Substance Use Topics    Alcohol use: No     Alcohol/week: 0.0 standard drinks      Family History   Problem Relation Age of Onset    Coronary Art Dis Mother     Diabetes Mother     High Blood Pressure Mother     Other Mother         thalassemia minor       Review of Systems   Constitutional:  Negative for chills, fatigue and fever. HENT:  Negative for congestion, ear pain, nosebleeds, postnasal drip and sore throat. Respiratory:  Negative for cough, chest tightness and wheezing. Cardiovascular:  Negative for chest pain, palpitations and leg swelling. Gastrointestinal:  Negative for abdominal pain and constipation. Genitourinary:  Negative for dysuria and urgency. Musculoskeletal:  Positive for arthralgias.         Left ankle pain   Skin:  Negative for rash. Neurological:  Negative for dizziness and headaches. Psychiatric/Behavioral: Negative. Vitals:    12/20/22 0845   BP: 122/76   Site: Left Upper Arm   Position: Sitting   Cuff Size: Large Adult   Pulse: 52   Resp: 18   SpO2: 97%   Weight: 175 lb (79.4 kg)   Height: 5' 4.5\" (1.638 m)     Body mass index is 29.57 kg/m². Physical Exam  Constitutional:       Appearance: She is well-developed. HENT:      Right Ear: External ear normal.      Left Ear: External ear normal.      Mouth/Throat:      Pharynx: No oropharyngeal exudate. Eyes:      Conjunctiva/sclera: Conjunctivae normal.      Pupils: Pupils are equal, round, and reactive to light. Neck:      Thyroid: No thyromegaly. Vascular: No JVD. Cardiovascular:      Rate and Rhythm: Normal rate. Heart sounds: Normal heart sounds. No murmur heard. Pulmonary:      Effort: No respiratory distress. Breath sounds: Normal breath sounds. No wheezing or rales. Chest:      Chest wall: No tenderness. Abdominal:      General: Bowel sounds are normal.      Palpations: Abdomen is soft. Musculoskeletal:      Cervical back: Neck supple. Lymphadenopathy:      Cervical: No cervical adenopathy. Skin:     Findings: No rash. Neurological:      Mental Status: She is oriented to person, place, and time.    Breast exam  Bilateral breast exam- symmetric, no nodules, no lymphadenopathy, no nipple discharge          Lab Review   Orders Only on 12/13/2022   Component Date Value    Vit D, 25-Hydroxy 12/13/2022 56.3     T4 Free 12/13/2022 1.08     TSH 12/13/2022 4.090     Color, UA 12/13/2022 YELLOW     Clarity, UA 12/13/2022 CLOUDY (A)     Glucose, Ur 12/13/2022 Negative     Bilirubin Urine 12/13/2022 Negative     Ketones, Urine 12/13/2022 Negative     Specific Gravity, UA 12/13/2022 1.027     Blood, Urine 12/13/2022 Negative     pH, UA 12/13/2022 5.5     Protein, UA 12/13/2022 Negative     Urobilinogen, Urine 12/13/2022 0.2     Nitrite, Urine 12/13/2022 Negative     Leukocyte Esterase, Urine 12/13/2022 SMALL (A)     Cholesterol, Total 12/13/2022 215 (A)     Triglycerides 12/13/2022 144     HDL 12/13/2022 72     LDL Calculated 12/13/2022 114     WBC 12/13/2022 5.8     RBC 12/13/2022 5.70 (A)     Hemoglobin 12/13/2022 12.6     Hematocrit 12/13/2022 40.5     MCV 12/13/2022 71.1 (A)     MCH 12/13/2022 22.1 (A)     MCHC 12/13/2022 31.1 (A)     RDW 12/13/2022 15.4 (A)     Platelets 35/49/6029 293     MPV 12/13/2022 11.4     Neutrophils % 12/13/2022 43.0 (A)     Lymphocytes % 12/13/2022 42.3 (A)     Monocytes % 12/13/2022 8.9     Eosinophils % 12/13/2022 4.6     Basophils % 12/13/2022 0.7     Neutrophils Absolute 12/13/2022 2.5     Immature Granulocytes # 12/13/2022 0.0     Lymphocytes Absolute 12/13/2022 2.5     Monocytes Absolute 12/13/2022 0.50     Eosinophils Absolute 12/13/2022 0.30     Basophils Absolute 12/13/2022 0.00     Sodium 12/13/2022 140     Potassium 12/13/2022 4.3     Chloride 12/13/2022 102     CO2 12/13/2022 28     Anion Gap 12/13/2022 10     Glucose 12/13/2022 91     BUN 12/13/2022 20     Creatinine 12/13/2022 0.6     Est, Glom Filt Rate 12/13/2022 >60     Calcium 12/13/2022 9.9     Total Protein 12/13/2022 6.9     Albumin 12/13/2022 4.2     Total Bilirubin 12/13/2022 0.7     Alkaline Phosphatase 12/13/2022 100     ALT 12/13/2022 29     AST 12/13/2022 22     Bacteria, UA 12/13/2022 1+ (A)     Crystals, UA 12/13/2022 NEG (A)     Hyaline Casts, UA 12/13/2022 4     WBC, UA 12/13/2022 5     RBC, UA 12/13/2022 1     Epithelial Cells, UA 12/13/2022 9            ASSESSMENT/PLAN:  Annual physical exam  *Screening for colorectal cancer 2/2022- repeat 5 yrs  * mammogram- schedule  * PAP 10/2019-  Needs pelvic exam next ecpe ( 1 ovary)  * BD-2/ 2022      Essential hypertension-  recently gyn office started lisinopril/ hctz  rx lisinopril/ hctz 10/12.5     IFG (impaired fasting glucose)  Continue good diet efforts,   A1c is now normal   FBS 81     Pure hypercholesterolemia-  I have personally reviewed and interpreted these lab results and thoroughly discussed with patient  LDL is 114  RX Lipitor 10 mg daily  Healthy, mostly fiber rich nonstarchy plant-based diet recommended  Recommend to decrease intake of processed foods, simple carbohydrates and animal-based products that high in saturated fats        Vit D level 56  Takes 2000 iu daily/ 4000 for wintertime     Previously E\elevated transaminase levels=  NOW NL      PFO/prior history of TIA. Pt follows dr Yefri Rees        Encounter for other screening for malignant neoplasm of breast  -     [facility] DIGITAL SCREEN W OR WO CAD BILATERAL; Future      Left ankle fracture  Had surgery with dr Conner Oconnor redo surgery  at [facility] This Encounter   Procedures    [facility] DIGITAL SCREEN W OR WO CAD BILATERAL    Comprehensive Metabolic Panel    Lipid Panel    Vitamin D 25 Hydroxy    Hemoglobin A1C     New Prescriptions    No medications on file         Return in about 6 months (around 6/20/2023) for Medication check. There are no Patient Instructions on file for this visit. EMR Dragon/transcription disclaimer:Significant part of this  encounter note is electronic transcription/translationof spoken language to printed text. The electronic translation of spoken language may be erroneous, or at times, nonsensical words or phrases may be inadvertently transcribed.  Although I have reviewed the note for sucherrors, some may still exist.

## 2023-01-05 RX ORDER — ESTRADIOL 1 MG/1
1 TABLET ORAL DAILY
Qty: 90 TABLET | Refills: 1 | Status: SHIPPED | OUTPATIENT
Start: 2023-01-05

## 2023-01-05 NOTE — TELEPHONE ENCOUNTER
Nathanael Stevens called requesting a refill of the below medication which has been pended for you:     Requested Prescriptions     Pending Prescriptions Disp Refills    estradiol (ESTRACE) 1 MG tablet [Pharmacy Med Name: ESTRADIOL 1MG TABLETS] 90 tablet 1     Sig: TAKE 1 TABLET BY MOUTH DAILY       Last Appointment Date: 12/20/2022  Next Appointment Date: 6/20/2023    Allergies   Allergen Reactions    Azithromycin Other (See Comments)     Chest pain/irruglar heat beat        Lortab [Hydrocodone-Acetaminophen]     Meperidine Other (See Comments)     unsure    Morphine Hives

## 2023-02-14 RX ORDER — LISINOPRIL AND HYDROCHLOROTHIAZIDE 12.5; 1 MG/1; MG/1
1 TABLET ORAL DAILY
Qty: 90 TABLET | Refills: 1 | Status: SHIPPED | OUTPATIENT
Start: 2023-02-14

## 2023-03-28 ENCOUNTER — HOSPITAL ENCOUNTER (OUTPATIENT)
Dept: WOMENS IMAGING | Age: 58
Discharge: HOME | End: 2023-03-28
Payer: COMMERCIAL

## 2023-03-28 VITALS — WEIGHT: 175 LBS | BODY MASS INDEX: 29.57 KG/M2

## 2023-03-28 DIAGNOSIS — Z12.39 ENCOUNTER FOR OTHER SCREENING FOR MALIGNANT NEOPLASM OF BREAST: ICD-10-CM

## 2023-03-28 PROCEDURE — 77063 BREAST TOMOSYNTHESIS BI: CPT

## 2023-03-28 PROCEDURE — 77067 SCR MAMMO BI INCL CAD: CPT | Performed by: RADIOLOGY

## 2023-03-29 RX ORDER — ATORVASTATIN CALCIUM 10 MG/1
TABLET, FILM COATED ORAL
Qty: 90 TABLET | Refills: 1 | Status: SHIPPED | OUTPATIENT
Start: 2023-03-29

## 2023-03-29 NOTE — TELEPHONE ENCOUNTER
Libra Han called requesting a refill of the below medication which has been pended for you:     Requested Prescriptions     Pending Prescriptions Disp Refills    atorvastatin (LIPITOR) 10 MG tablet [Pharmacy Med Name: ATORVASTATIN 10MG TABLETS] 90 tablet 1     Sig: TAKE 1 TABLET BY MOUTH EVERY DAY       Last Appointment Date: 12/20/2022  Next Appointment Date: 6/20/2023    Allergies   Allergen Reactions    Azithromycin Other (See Comments)     Chest pain/irruglar heat beat        Lortab [Hydrocodone-Acetaminophen]     Meperidine Other (See Comments)     unsure    Morphine Hives

## 2023-06-12 DIAGNOSIS — I10 ESSENTIAL HYPERTENSION: ICD-10-CM

## 2023-06-12 DIAGNOSIS — Z00.00 ANNUAL PHYSICAL EXAM: ICD-10-CM

## 2023-06-12 DIAGNOSIS — Z12.39 ENCOUNTER FOR OTHER SCREENING FOR MALIGNANT NEOPLASM OF BREAST: ICD-10-CM

## 2023-06-12 DIAGNOSIS — R73.01 IFG (IMPAIRED FASTING GLUCOSE): ICD-10-CM

## 2023-06-12 DIAGNOSIS — E55.9 VITAMIN D DEFICIENCY: ICD-10-CM

## 2023-06-12 DIAGNOSIS — M85.852 OSTEOPENIA OF LEFT THIGH: ICD-10-CM

## 2023-06-12 DIAGNOSIS — E78.00 PURE HYPERCHOLESTEROLEMIA: ICD-10-CM

## 2023-06-12 LAB
25(OH)D3 SERPL-MCNC: 41.5 NG/ML
ALBUMIN SERPL-MCNC: 4 G/DL (ref 3.5–5.2)
ALP SERPL-CCNC: 83 U/L (ref 35–104)
ALT SERPL-CCNC: 24 U/L (ref 5–33)
ANION GAP SERPL CALCULATED.3IONS-SCNC: 7 MMOL/L (ref 7–19)
AST SERPL-CCNC: 24 U/L (ref 5–32)
BILIRUB SERPL-MCNC: 0.6 MG/DL (ref 0.2–1.2)
BUN SERPL-MCNC: 23 MG/DL (ref 6–20)
CALCIUM SERPL-MCNC: 9.5 MG/DL (ref 8.6–10)
CHLORIDE SERPL-SCNC: 106 MMOL/L (ref 98–111)
CHOLEST SERPL-MCNC: 177 MG/DL (ref 160–199)
CO2 SERPL-SCNC: 27 MMOL/L (ref 22–29)
CREAT SERPL-MCNC: 0.7 MG/DL (ref 0.5–0.9)
GLUCOSE SERPL-MCNC: 94 MG/DL (ref 74–109)
HBA1C MFR BLD: 5.3 % (ref 4–6)
HDLC SERPL-MCNC: 62 MG/DL (ref 65–121)
LDLC SERPL CALC-MCNC: 98 MG/DL
POTASSIUM SERPL-SCNC: 4.4 MMOL/L (ref 3.5–5)
PROT SERPL-MCNC: 7 G/DL (ref 6.6–8.7)
SODIUM SERPL-SCNC: 140 MMOL/L (ref 136–145)
TRIGL SERPL-MCNC: 86 MG/DL (ref 0–149)

## 2023-08-22 ENCOUNTER — OFFICE VISIT (OUTPATIENT)
Dept: INTERNAL MEDICINE | Age: 58
End: 2023-08-22
Payer: COMMERCIAL

## 2023-08-22 VITALS
HEIGHT: 65 IN | BODY MASS INDEX: 29.02 KG/M2 | WEIGHT: 174.2 LBS | OXYGEN SATURATION: 98 % | DIASTOLIC BLOOD PRESSURE: 72 MMHG | SYSTOLIC BLOOD PRESSURE: 118 MMHG | HEART RATE: 84 BPM

## 2023-08-22 DIAGNOSIS — J02.8 ACUTE BACTERIAL PHARYNGITIS: ICD-10-CM

## 2023-08-22 DIAGNOSIS — B96.89 ACUTE BACTERIAL PHARYNGITIS: ICD-10-CM

## 2023-08-22 DIAGNOSIS — J02.9 SORE THROAT: Primary | ICD-10-CM

## 2023-08-22 LAB — S PYO AG THROAT QL: NORMAL

## 2023-08-22 PROCEDURE — 99213 OFFICE O/P EST LOW 20 MIN: CPT | Performed by: INTERNAL MEDICINE

## 2023-08-22 PROCEDURE — 3074F SYST BP LT 130 MM HG: CPT | Performed by: INTERNAL MEDICINE

## 2023-08-22 PROCEDURE — 3078F DIAST BP <80 MM HG: CPT | Performed by: INTERNAL MEDICINE

## 2023-08-22 RX ORDER — AMOXICILLIN AND CLAVULANATE POTASSIUM 500; 125 MG/1; MG/1
1 TABLET, FILM COATED ORAL EVERY 12 HOURS
Qty: 14 TABLET | Refills: 0 | Status: SHIPPED | OUTPATIENT
Start: 2023-08-22 | End: 2023-08-29

## 2023-08-22 ASSESSMENT — ENCOUNTER SYMPTOMS
WHEEZING: 0
CHEST TIGHTNESS: 0
COUGH: 0
SORE THROAT: 1
CONSTIPATION: 0
ABDOMINAL PAIN: 0

## 2023-12-27 DIAGNOSIS — E78.00 PURE HYPERCHOLESTEROLEMIA: ICD-10-CM

## 2023-12-27 DIAGNOSIS — M85.852 OSTEOPENIA OF LEFT THIGH: ICD-10-CM

## 2023-12-27 DIAGNOSIS — E55.9 VITAMIN D DEFICIENCY: ICD-10-CM

## 2023-12-27 DIAGNOSIS — I10 ESSENTIAL HYPERTENSION: ICD-10-CM

## 2023-12-27 DIAGNOSIS — R73.01 IFG (IMPAIRED FASTING GLUCOSE): ICD-10-CM

## 2023-12-27 LAB
25(OH)D3 SERPL-MCNC: 54 NG/ML
ALBUMIN SERPL-MCNC: 4.4 G/DL (ref 3.5–5.2)
ALP SERPL-CCNC: 84 U/L (ref 35–104)
ALT SERPL-CCNC: 26 U/L (ref 5–33)
ANION GAP SERPL CALCULATED.3IONS-SCNC: 10 MMOL/L (ref 7–19)
AST SERPL-CCNC: 19 U/L (ref 5–32)
BASOPHILS # BLD: 0 K/UL (ref 0–0.2)
BASOPHILS NFR BLD: 0.4 % (ref 0–1)
BILIRUB SERPL-MCNC: 0.7 MG/DL (ref 0.2–1.2)
BILIRUB UR QL STRIP: NEGATIVE
BUN SERPL-MCNC: 24 MG/DL (ref 6–20)
CALCIUM SERPL-MCNC: 9.6 MG/DL (ref 8.6–10)
CHLORIDE SERPL-SCNC: 103 MMOL/L (ref 98–111)
CHOLEST SERPL-MCNC: 211 MG/DL (ref 160–199)
CLARITY UR: ABNORMAL
CO2 SERPL-SCNC: 28 MMOL/L (ref 22–29)
COLOR UR: YELLOW
CREAT SERPL-MCNC: 0.6 MG/DL (ref 0.5–0.9)
EOSINOPHIL # BLD: 0.3 K/UL (ref 0–0.6)
EOSINOPHIL NFR BLD: 3.9 % (ref 0–5)
ERYTHROCYTE [DISTWIDTH] IN BLOOD BY AUTOMATED COUNT: 16 % (ref 11.5–14.5)
GLUCOSE SERPL-MCNC: 98 MG/DL (ref 74–109)
GLUCOSE UR STRIP.AUTO-MCNC: NEGATIVE MG/DL
HBA1C MFR BLD: 5.3 % (ref 4–6)
HCT VFR BLD AUTO: 41.1 % (ref 37–47)
HDLC SERPL-MCNC: 69 MG/DL (ref 65–121)
HGB BLD-MCNC: 12.7 G/DL (ref 12–16)
HGB UR STRIP.AUTO-MCNC: NEGATIVE MG/L
IMM GRANULOCYTES # BLD: 0 K/UL
KETONES UR STRIP.AUTO-MCNC: NEGATIVE MG/DL
LDLC SERPL CALC-MCNC: 120 MG/DL
LEUKOCYTE ESTERASE UR QL STRIP.AUTO: NEGATIVE
LYMPHOCYTES # BLD: 2.5 K/UL (ref 1.1–4.5)
LYMPHOCYTES NFR BLD: 36.5 % (ref 20–40)
MCH RBC QN AUTO: 22 PG (ref 27–31)
MCHC RBC AUTO-ENTMCNC: 30.9 G/DL (ref 33–37)
MCV RBC AUTO: 71.2 FL (ref 81–99)
MONOCYTES # BLD: 0.6 K/UL (ref 0–0.9)
MONOCYTES NFR BLD: 8.4 % (ref 0–10)
NEUTROPHILS # BLD: 3.5 K/UL (ref 1.5–7.5)
NEUTS SEG NFR BLD: 50.2 % (ref 50–65)
NITRITE UR QL STRIP.AUTO: NEGATIVE
PH UR STRIP.AUTO: 5.5 [PH] (ref 5–8)
PLATELET # BLD AUTO: 313 K/UL (ref 130–400)
PMV BLD AUTO: 11.1 FL (ref 9.4–12.3)
POTASSIUM SERPL-SCNC: 4.1 MMOL/L (ref 3.5–5)
PROT SERPL-MCNC: 7.2 G/DL (ref 6.6–8.7)
PROT UR STRIP.AUTO-MCNC: NEGATIVE MG/DL
RBC # BLD AUTO: 5.77 M/UL (ref 4.2–5.4)
SODIUM SERPL-SCNC: 141 MMOL/L (ref 136–145)
SP GR UR STRIP.AUTO: 1.03 (ref 1–1.03)
TRIGL SERPL-MCNC: 109 MG/DL (ref 0–149)
TSH SERPL DL<=0.005 MIU/L-ACNC: 2.94 UIU/ML (ref 0.27–4.2)
UROBILINOGEN UR STRIP.AUTO-MCNC: 0.2 E.U./DL
WBC # BLD AUTO: 6.9 K/UL (ref 4.8–10.8)

## 2024-01-04 ENCOUNTER — OFFICE VISIT (OUTPATIENT)
Dept: INTERNAL MEDICINE | Age: 59
End: 2024-01-04
Payer: COMMERCIAL

## 2024-01-04 ENCOUNTER — HOSPITAL ENCOUNTER (OUTPATIENT)
Dept: GENERAL RADIOLOGY | Age: 59
Discharge: HOME | End: 2024-01-04
Payer: COMMERCIAL

## 2024-01-04 VITALS
BODY MASS INDEX: 29.99 KG/M2 | HEIGHT: 65 IN | DIASTOLIC BLOOD PRESSURE: 70 MMHG | HEART RATE: 79 BPM | SYSTOLIC BLOOD PRESSURE: 118 MMHG | OXYGEN SATURATION: 99 % | WEIGHT: 180 LBS

## 2024-01-04 DIAGNOSIS — E55.9 VITAMIN D DEFICIENCY: ICD-10-CM

## 2024-01-04 DIAGNOSIS — Z12.31 ENCOUNTER FOR SCREENING MAMMOGRAM FOR MALIGNANT NEOPLASM OF BREAST: Primary | ICD-10-CM

## 2024-01-04 DIAGNOSIS — R07.89 CHEST WALL PAIN: ICD-10-CM

## 2024-01-04 DIAGNOSIS — R73.01 IFG (IMPAIRED FASTING GLUCOSE): ICD-10-CM

## 2024-01-04 DIAGNOSIS — E78.00 PURE HYPERCHOLESTEROLEMIA: ICD-10-CM

## 2024-01-04 DIAGNOSIS — I10 ESSENTIAL HYPERTENSION: ICD-10-CM

## 2024-01-04 PROCEDURE — 99396 PREV VISIT EST AGE 40-64: CPT | Performed by: INTERNAL MEDICINE

## 2024-01-04 PROCEDURE — 3078F DIAST BP <80 MM HG: CPT | Performed by: INTERNAL MEDICINE

## 2024-01-04 PROCEDURE — 3074F SYST BP LT 130 MM HG: CPT | Performed by: INTERNAL MEDICINE

## 2024-01-04 PROCEDURE — 71101 X-RAY EXAM UNILAT RIBS/CHEST: CPT

## 2024-01-04 RX ORDER — LISINOPRIL AND HYDROCHLOROTHIAZIDE 12.5; 1 MG/1; MG/1
1 TABLET ORAL DAILY
Qty: 90 TABLET | Refills: 1 | Status: SHIPPED | OUTPATIENT
Start: 2024-01-04

## 2024-01-04 RX ORDER — ESTRADIOL 1 MG/1
1 TABLET ORAL DAILY
Qty: 90 TABLET | Refills: 1 | Status: SHIPPED | OUTPATIENT
Start: 2024-01-04

## 2024-01-04 RX ORDER — ATORVASTATIN CALCIUM 10 MG/1
10 TABLET, FILM COATED ORAL DAILY
Qty: 90 TABLET | Refills: 1 | Status: SHIPPED | OUTPATIENT
Start: 2024-01-04

## 2024-01-04 RX ORDER — ACYCLOVIR 800 MG/1
TABLET ORAL
Qty: 90 TABLET | Refills: 2 | Status: SHIPPED | OUTPATIENT
Start: 2024-01-04

## 2024-01-04 ASSESSMENT — PATIENT HEALTH QUESTIONNAIRE - PHQ9
5. POOR APPETITE OR OVEREATING: 0
1. LITTLE INTEREST OR PLEASURE IN DOING THINGS: 0
10. IF YOU CHECKED OFF ANY PROBLEMS, HOW DIFFICULT HAVE THESE PROBLEMS MADE IT FOR YOU TO DO YOUR WORK, TAKE CARE OF THINGS AT HOME, OR GET ALONG WITH OTHER PEOPLE: 1
2. FEELING DOWN, DEPRESSED OR HOPELESS: 0
6. FEELING BAD ABOUT YOURSELF - OR THAT YOU ARE A FAILURE OR HAVE LET YOURSELF OR YOUR FAMILY DOWN: 0
SUM OF ALL RESPONSES TO PHQ QUESTIONS 1-9: 0
SUM OF ALL RESPONSES TO PHQ QUESTIONS 1-9: 0
3. TROUBLE FALLING OR STAYING ASLEEP: 0
7. TROUBLE CONCENTRATING ON THINGS, SUCH AS READING THE NEWSPAPER OR WATCHING TELEVISION: 0
4. FEELING TIRED OR HAVING LITTLE ENERGY: 0
SUM OF ALL RESPONSES TO PHQ9 QUESTIONS 1 & 2: 0
SUM OF ALL RESPONSES TO PHQ QUESTIONS 1-9: 0
9. THOUGHTS THAT YOU WOULD BE BETTER OFF DEAD, OR OF HURTING YOURSELF: 0
8. MOVING OR SPEAKING SO SLOWLY THAT OTHER PEOPLE COULD HAVE NOTICED. OR THE OPPOSITE, BEING SO FIGETY OR RESTLESS THAT YOU HAVE BEEN MOVING AROUND A LOT MORE THAN USUAL: 0
SUM OF ALL RESPONSES TO PHQ QUESTIONS 1-9: 0

## 2024-01-04 ASSESSMENT — ENCOUNTER SYMPTOMS
CHEST TIGHTNESS: 0
SORE THROAT: 0
CONSTIPATION: 0
COUGH: 0
WHEEZING: 0
ABDOMINAL PAIN: 0

## 2024-01-04 NOTE — PROGRESS NOTES
PRN 30 tablet 2    escitalopram (LEXAPRO) 10 MG tablet Take 1 tablet by mouth daily 30 tablet 5    BREO ELLIPTA 100-25 MCG/INH AEPB inhaler INHALE 1 PUFF BY MOUTH DAILY. RINSE MOUTH AFTER USE      albuterol (PROVENTIL) (2.5 MG/3ML) 0.083% nebulizer solution Take 3 mLs by nebulization every 6 hours as needed for Wheezing 120 each 1    albuterol sulfate HFA (PROAIR HFA) 108 (90 Base) MCG/ACT inhaler Inhale 2 puffs into the lungs every 6 hours as needed for Wheezing 1 Inhaler 3    cyclobenzaprine (FLEXERIL) 10 MG tablet Take 1 tablet by mouth nightly as needed for Muscle spasms 90 tablet 1    Cyanocobalamin (B-12) 5000 MCG SUBL Place under the tongue      vitamin D (CHOLECALCIFEROL) 1000 UNIT TABS tablet Take 2 tablets by mouth daily      aspirin 81 MG tablet Take 1 tablet by mouth daily      Cetirizine HCl (ZYRTEC PO) Take 10 mg by mouth daily       Multiple Vitamins-Minerals (CERTA-MARY WITH MINERALS ORAL) solution Take 15 mLs by mouth daily       No current facility-administered medications for this visit.     Allergies   Allergen Reactions    Azithromycin Other (See Comments)     Chest pain/irruglar heat beat        Lortab [Hydrocodone-Acetaminophen]     Meperidine Other (See Comments)     unsure    Morphine Hives       Social History     Socioeconomic History    Marital status:      Spouse name: None    Number of children: None    Years of education: None    Highest education level: None   Tobacco Use    Smoking status: Never    Smokeless tobacco: Never   Substance and Sexual Activity    Alcohol use: No     Alcohol/week: 0.0 standard drinks of alcohol    Drug use: No     Social Determinants of Health     Financial Resource Strain: Low Risk  (6/20/2022)    Overall Financial Resource Strain (CARDIA)     Difficulty of Paying Living Expenses: Not hard at all   Transportation Needs: Unknown (12/28/2020)    PRAPARE - Transportation     Lack of Transportation (Medical): Patient declined     Lack of Transportation

## 2024-02-06 ENCOUNTER — TELEMEDICINE (OUTPATIENT)
Dept: INTERNAL MEDICINE | Age: 59
End: 2024-02-06
Payer: COMMERCIAL

## 2024-02-06 DIAGNOSIS — U07.1 ACUTE COVID-19: Primary | ICD-10-CM

## 2024-02-06 DIAGNOSIS — R09.81 SINUS CONGESTION: ICD-10-CM

## 2024-02-06 DIAGNOSIS — J45.30 MILD PERSISTENT ASTHMA WITHOUT COMPLICATION: ICD-10-CM

## 2024-02-06 DIAGNOSIS — R05.1 ACUTE COUGH: ICD-10-CM

## 2024-02-06 PROCEDURE — 99213 OFFICE O/P EST LOW 20 MIN: CPT | Performed by: INTERNAL MEDICINE

## 2024-02-06 RX ORDER — FLUTICASONE FUROATE AND VILANTEROL 100; 25 UG/1; UG/1
1 POWDER RESPIRATORY (INHALATION) DAILY
Qty: 1 EACH | Refills: 0 | Status: SHIPPED | OUTPATIENT
Start: 2024-02-06

## 2024-02-06 RX ORDER — NIRMATRELVIR AND RITONAVIR 150-100 MG
KIT ORAL
Qty: 20 TABLET | Refills: 0 | Status: SHIPPED | OUTPATIENT
Start: 2024-02-06 | End: 2024-02-11

## 2024-02-06 ASSESSMENT — ENCOUNTER SYMPTOMS
SORE THROAT: 0
SINUS PAIN: 1
COUGH: 1
ABDOMINAL PAIN: 0
SINUS PRESSURE: 1
CHEST TIGHTNESS: 0
WHEEZING: 0
CONSTIPATION: 0

## 2024-02-06 NOTE — PROGRESS NOTES
Chief Complaint   Patient presents with    Other     Positive for covid-tested today  Symptoms started yesterday-cough, chills, chest congestion     History of presenting illness:  Halima Chan is a58 y.o. female     TELEHEALTH EVALUATION -- Audio/Visual   Services were provided through a video synchronous discussion virtually to substitute for in-person clinic visit.    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    Elevated transaminase level 11/07/2017    PFO (patent foramen ovale) 11/07/2017     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HCC)     Essential hypertension     Gastroesophageal reflux disease without esophagitis     Herpes simplex     Hyperlipidemia     Irregular heart rhythm     Neck pain, chronic     PFO (patent foramen ovale)       Past Surgical History:   Procedure Laterality Date    BREAST SURGERY      CHOLECYSTECTOMY      HYSTERECTOMY (CERVIX STATUS UNKNOWN)  2014    HYSTERECTOMY, VAGINAL      one ovary remians    OVARY REMOVAL Right 2014     Current Outpatient Medications   Medication Sig Dispense Refill    nirmatrelvir/ritonavir (PAXLOVID, 150/100,) 10 x 150 MG & 10 x 100MG TBPK Take 2 tablets (one 150 mg nirmatrelvir and one 100 mg ritonavir tablets) by mouth every 12 hours for 5 days. 20 tablet 0    fluticasone furoate-vilanterol (BREO ELLIPTA) 100-25 MCG/ACT inhaler

## 2024-02-20 RX ORDER — LISINOPRIL AND HYDROCHLOROTHIAZIDE 12.5; 1 MG/1; MG/1
1 TABLET ORAL DAILY
Qty: 90 TABLET | Refills: 1 | Status: SHIPPED | OUTPATIENT
Start: 2024-02-20

## 2024-02-20 NOTE — TELEPHONE ENCOUNTER
Halima Chan called to request a refill on her medication.      Last office visit : 2/6/2024   Next office visit : 7/8/2024     Requested Prescriptions     Pending Prescriptions Disp Refills    lisinopril-hydroCHLOROthiazide (PRINZIDE;ZESTORETIC) 10-12.5 MG per tablet 90 tablet 1     Sig: Take 1 tablet by mouth daily            Debbie Abad MA

## 2024-04-29 ENCOUNTER — TRANSCRIBE ORDERS (OUTPATIENT)
Dept: ADMINISTRATIVE | Facility: HOSPITAL | Age: 59
End: 2024-04-29
Payer: COMMERCIAL

## 2024-04-29 DIAGNOSIS — M54.50 DORSALGIA OF LUMBAR REGION: Primary | ICD-10-CM

## 2024-04-30 ENCOUNTER — HOSPITAL ENCOUNTER (OUTPATIENT)
Dept: GENERAL RADIOLOGY | Facility: HOSPITAL | Age: 59
Discharge: HOME | End: 2024-04-30
Admitting: PHYSICIAN ASSISTANT
Payer: COMMERCIAL

## 2024-04-30 DIAGNOSIS — M54.50 DORSALGIA OF LUMBAR REGION: ICD-10-CM

## 2024-04-30 PROCEDURE — 72110 X-RAY EXAM L-2 SPINE 4/>VWS: CPT

## 2024-05-16 RX ORDER — ACYCLOVIR 800 MG/1
TABLET ORAL
Qty: 90 TABLET | Refills: 2 | Status: SHIPPED | OUTPATIENT
Start: 2024-05-16

## 2024-05-16 NOTE — TELEPHONE ENCOUNTER
Halima Chan called to request a refill on her medication.      Last office visit : 2/6/2024   Next office visit : 7/8/2024     Requested Prescriptions     Pending Prescriptions Disp Refills    acyclovir (ZOVIRAX) 800 MG tablet 90 tablet 2     Sig: TK 1 T PO HO Abad MA

## 2024-06-28 DIAGNOSIS — I10 ESSENTIAL HYPERTENSION: ICD-10-CM

## 2024-06-28 DIAGNOSIS — Z12.31 ENCOUNTER FOR SCREENING MAMMOGRAM FOR MALIGNANT NEOPLASM OF BREAST: ICD-10-CM

## 2024-06-28 DIAGNOSIS — E78.00 PURE HYPERCHOLESTEROLEMIA: ICD-10-CM

## 2024-06-28 DIAGNOSIS — E55.9 VITAMIN D DEFICIENCY: ICD-10-CM

## 2024-06-28 DIAGNOSIS — R73.01 IFG (IMPAIRED FASTING GLUCOSE): ICD-10-CM

## 2024-06-28 LAB
25(OH)D3 SERPL-MCNC: 44.3 NG/ML
ALBUMIN SERPL-MCNC: 4.1 G/DL (ref 3.5–5.2)
ALP SERPL-CCNC: 86 U/L (ref 35–104)
ALT SERPL-CCNC: 24 U/L (ref 5–33)
ANION GAP SERPL CALCULATED.3IONS-SCNC: 11 MMOL/L (ref 7–19)
AST SERPL-CCNC: 21 U/L (ref 5–32)
BILIRUB SERPL-MCNC: 0.7 MG/DL (ref 0.2–1.2)
BUN SERPL-MCNC: 24 MG/DL (ref 6–20)
CALCIUM SERPL-MCNC: 9.2 MG/DL (ref 8.6–10)
CHLORIDE SERPL-SCNC: 105 MMOL/L (ref 98–111)
CHOLEST SERPL-MCNC: 193 MG/DL (ref 160–199)
CO2 SERPL-SCNC: 25 MMOL/L (ref 22–29)
CREAT SERPL-MCNC: 0.6 MG/DL (ref 0.5–0.9)
GLUCOSE SERPL-MCNC: 93 MG/DL (ref 74–109)
HBA1C MFR BLD: 5.4 % (ref 4–6)
HDLC SERPL-MCNC: 69 MG/DL (ref 65–121)
LDLC SERPL CALC-MCNC: 103 MG/DL
POTASSIUM SERPL-SCNC: 4.2 MMOL/L (ref 3.5–5)
PROT SERPL-MCNC: 7.1 G/DL (ref 6.6–8.7)
SODIUM SERPL-SCNC: 141 MMOL/L (ref 136–145)
TRIGL SERPL-MCNC: 104 MG/DL (ref 0–149)

## 2024-07-01 ENCOUNTER — OFFICE VISIT (OUTPATIENT)
Dept: INTERNAL MEDICINE | Age: 59
End: 2024-07-01
Payer: COMMERCIAL

## 2024-07-01 VITALS
OXYGEN SATURATION: 99 % | BODY MASS INDEX: 29.92 KG/M2 | HEART RATE: 73 BPM | DIASTOLIC BLOOD PRESSURE: 82 MMHG | HEIGHT: 65 IN | WEIGHT: 179.6 LBS | SYSTOLIC BLOOD PRESSURE: 120 MMHG

## 2024-07-01 DIAGNOSIS — L98.9 SKIN LESION: ICD-10-CM

## 2024-07-01 DIAGNOSIS — E55.9 VITAMIN D DEFICIENCY: ICD-10-CM

## 2024-07-01 DIAGNOSIS — I10 ESSENTIAL HYPERTENSION: Primary | ICD-10-CM

## 2024-07-01 DIAGNOSIS — R73.01 IFG (IMPAIRED FASTING GLUCOSE): ICD-10-CM

## 2024-07-01 DIAGNOSIS — Z00.00 ANNUAL PHYSICAL EXAM: ICD-10-CM

## 2024-07-01 DIAGNOSIS — E78.00 PURE HYPERCHOLESTEROLEMIA: ICD-10-CM

## 2024-07-01 PROCEDURE — 3079F DIAST BP 80-89 MM HG: CPT | Performed by: INTERNAL MEDICINE

## 2024-07-01 PROCEDURE — 99214 OFFICE O/P EST MOD 30 MIN: CPT | Performed by: INTERNAL MEDICINE

## 2024-07-01 PROCEDURE — 3074F SYST BP LT 130 MM HG: CPT | Performed by: INTERNAL MEDICINE

## 2024-07-01 RX ORDER — ATORVASTATIN CALCIUM 10 MG/1
10 TABLET, FILM COATED ORAL DAILY
Qty: 90 TABLET | Refills: 1 | Status: SHIPPED | OUTPATIENT
Start: 2024-07-01

## 2024-07-01 RX ORDER — LISINOPRIL AND HYDROCHLOROTHIAZIDE 12.5; 1 MG/1; MG/1
1 TABLET ORAL DAILY
Qty: 90 TABLET | Refills: 1 | Status: SHIPPED | OUTPATIENT
Start: 2024-07-01

## 2024-07-01 SDOH — ECONOMIC STABILITY: FOOD INSECURITY: WITHIN THE PAST 12 MONTHS, THE FOOD YOU BOUGHT JUST DIDN'T LAST AND YOU DIDN'T HAVE MONEY TO GET MORE.: NEVER TRUE

## 2024-07-01 SDOH — ECONOMIC STABILITY: INCOME INSECURITY: HOW HARD IS IT FOR YOU TO PAY FOR THE VERY BASICS LIKE FOOD, HOUSING, MEDICAL CARE, AND HEATING?: NOT HARD AT ALL

## 2024-07-01 SDOH — ECONOMIC STABILITY: HOUSING INSECURITY
IN THE LAST 12 MONTHS, WAS THERE A TIME WHEN YOU DID NOT HAVE A STEADY PLACE TO SLEEP OR SLEPT IN A SHELTER (INCLUDING NOW)?: NO

## 2024-07-01 SDOH — ECONOMIC STABILITY: FOOD INSECURITY: WITHIN THE PAST 12 MONTHS, YOU WORRIED THAT YOUR FOOD WOULD RUN OUT BEFORE YOU GOT MONEY TO BUY MORE.: NEVER TRUE

## 2024-07-01 ASSESSMENT — ENCOUNTER SYMPTOMS
ABDOMINAL PAIN: 0
SORE THROAT: 0
COUGH: 0
WHEEZING: 0
CONSTIPATION: 0
CHEST TIGHTNESS: 0

## 2024-07-01 NOTE — PROGRESS NOTES
Chief Complaint   Patient presents with    6 Month Follow-Up     Spot on right breast she noticed a month ago-some itching      History of presenting illness:  Halima Chan is a58 y.o. female who presents today for follow up on her chronic medical conditions as noted below.    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    Elevated transaminase level 11/07/2017    PFO (patent foramen ovale) 11/07/2017     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HCC)     Essential hypertension     Gastroesophageal reflux disease without esophagitis     Herpes simplex     Hyperlipidemia     Irregular heart rhythm     Neck pain, chronic     PFO (patent foramen ovale)       Past Surgical History:   Procedure Laterality Date    BREAST SURGERY      CHOLECYSTECTOMY      HYSTERECTOMY (CERVIX STATUS UNKNOWN)  2014    HYSTERECTOMY, VAGINAL      one ovary remians    OVARY REMOVAL Right 2014     Current Outpatient Medications   Medication Sig Dispense Refill    atorvastatin (LIPITOR) 10 MG tablet Take 1 tablet by mouth daily 90 tablet 1    lisinopril-hydroCHLOROthiazide (PRINZIDE;ZESTORETIC) 10-12.5 MG per tablet Take 1 tablet by mouth daily 90 tablet 1    acyclovir (ZOVIRAX) 800 MG tablet TK 1 T PO D 90 tablet 2    estradiol (ESTRACE) 1 MG tablet Take 1 tablet by mouth daily 90 tablet 1    BREO ELLIPTA 100-25 MCG/INH

## 2024-07-29 RX ORDER — ESTRADIOL 1 MG/1
1 TABLET ORAL DAILY
Qty: 90 TABLET | Refills: 1 | Status: SHIPPED | OUTPATIENT
Start: 2024-07-29

## 2024-07-29 NOTE — TELEPHONE ENCOUNTER
Halima Chan called to request a refill on her medication.      Last office visit : 7/1/2024   Next office visit : 1/6/2025     Requested Prescriptions     Pending Prescriptions Disp Refills    estradiol (ESTRACE) 1 MG tablet 90 tablet 1     Sig: Take 1 tablet by mouth daily            April Gisela Beyer MA

## 2024-08-23 RX ORDER — LISINOPRIL AND HYDROCHLOROTHIAZIDE 12.5; 1 MG/1; MG/1
1 TABLET ORAL DAILY
Qty: 90 TABLET | Refills: 1 | Status: SHIPPED | OUTPATIENT
Start: 2024-08-23

## 2024-09-20 RX ORDER — ALBUTEROL SULFATE 90 UG/1
2 INHALANT RESPIRATORY (INHALATION) EVERY 6 HOURS PRN
Qty: 1 EACH | Refills: 3 | Status: SHIPPED | OUTPATIENT
Start: 2024-09-20

## 2024-09-20 RX ORDER — ALBUTEROL SULFATE 0.83 MG/ML
2.5 SOLUTION RESPIRATORY (INHALATION) EVERY 6 HOURS PRN
Qty: 120 EACH | Refills: 1 | Status: SHIPPED | OUTPATIENT
Start: 2024-09-20

## 2024-12-19 ENCOUNTER — OFFICE VISIT (OUTPATIENT)
Dept: CARDIOLOGY | Facility: CLINIC | Age: 59
End: 2024-12-19
Payer: COMMERCIAL

## 2024-12-19 VITALS
SYSTOLIC BLOOD PRESSURE: 110 MMHG | DIASTOLIC BLOOD PRESSURE: 80 MMHG | HEART RATE: 62 BPM | HEIGHT: 64 IN | OXYGEN SATURATION: 98 % | WEIGHT: 180 LBS | BODY MASS INDEX: 30.73 KG/M2

## 2024-12-19 DIAGNOSIS — R07.89 ATYPICAL CHEST PAIN: Primary | ICD-10-CM

## 2024-12-19 DIAGNOSIS — R00.2 PALPITATIONS: ICD-10-CM

## 2024-12-19 DIAGNOSIS — I44.7 LBBB (LEFT BUNDLE BRANCH BLOCK): ICD-10-CM

## 2024-12-19 PROCEDURE — 99204 OFFICE O/P NEW MOD 45 MIN: CPT | Performed by: INTERNAL MEDICINE

## 2024-12-19 PROCEDURE — 93000 ELECTROCARDIOGRAM COMPLETE: CPT | Performed by: INTERNAL MEDICINE

## 2024-12-19 RX ORDER — BUDESONIDE AND FORMOTEROL FUMARATE DIHYDRATE 160; 4.5 UG/1; UG/1
2 AEROSOL RESPIRATORY (INHALATION)
COMMUNITY
Start: 2024-11-20

## 2024-12-19 RX ORDER — ALBUTEROL SULFATE 0.83 MG/ML
2.5 SOLUTION RESPIRATORY (INHALATION) EVERY 4 HOURS PRN
COMMUNITY
Start: 2024-09-20

## 2024-12-19 NOTE — PROGRESS NOTES
Subjective:     Encounter Date:12/19/2024      Patient ID: Marichuy Mosqueda is a 59 y.o. female with palpitations, a left bundle branch block, patent foramen ovale, presenting for follow-up.  She was last seen in our office in 2021.    Chief Complaint: Here for follow-up, recent episode of atypical chest discomfort    History of Present Illness    This is a 59-year-old female who presents today essentially for follow-up.  She was last seen in our office a few years ago.  We had previously evaluated her for a patent foramen ovale but did not feel the need for closure as she had not ever had a neurologic deficit.  She also was noted to have a left bundle branch block.  She has asthma and COPD.  She does have some mitten issues with her breathing into these problems but her breathing is otherwise stable.  She said that recently she did have an episode of chest discomfort that occurred during very cold weather.  She was outside walking and began to experience some sharp left-sided chest discomfort.  Ultimately, this resolved without any type of intervention.  She has not had any symptoms like that since the episode but says that in the past, weather has caused some breathing issues at times and also some issues with some chest discomfort.  However, she otherwise leads a very active lifestyle and does not endorse any significant chest pain with exertion or shortness of breath that limits exertion.  She has a history of intermittent palpitations.  She also has some chronic neck issues and says that positional changes with her head or neck can sometimes cause some dizziness.  She denies having syncopal episodes.  Her blood pressure is generally well-controlled.  Overall, she says that she seems to be doing well at this time.    The following portions of the patient's history were reviewed and updated as appropriate: allergies, current medications, past family history, past medical history, past social history, past  surgical history, and problem list.    Past Medical History:   Diagnosis Date    Asthma     Breast lump     Endometriosis     Genital herpes     Hyperlipidemia     Hypertension     Left bundle branch block     PFO with atrial septal aneurysm     PONV (postoperative nausea and vomiting)      Past Surgical History:   Procedure Laterality Date    BREAST SURGERY      CHOLECYSTECTOMY      COLONOSCOPY  2000    normal    COLONOSCOPY N/A 02/03/2017    Procedure: COLONOSCOPY WITH ANESTHESIA;  Surgeon: Ton Cooper DO;  Location: [facility] ENDOSCOPY;  Service:     COLONOSCOPY N/A 02/14/2022    Procedure: COLONOSCOPY WITH ANESTHESIA;  Surgeon: Ton Cooper DO;  Location: [facility] ENDOSCOPY;  Service: Gastroenterology;  Laterality: N/A;  preo; hx of polyps  postop; normal   PCP Huyen Ramirez     HYSTERECTOMY      partial    ROTATOR CUFF REPAIR Right        Current Outpatient Medications:     acyclovir (ZOVIRAX) 200 MG capsule, Take  by mouth Every 4 (Four) Hours While Awake., Disp: , Rfl:     albuterol (PROVENTIL) (2.5 MG/3ML) 0.083% nebulizer solution, Take 2.5 mg by nebulization Every 4 (Four) Hours As Needed for Wheezing., Disp: , Rfl:     aspirin 81 MG EC tablet, Take 1 tablet by mouth Daily., Disp: , Rfl:     atorvastatin (LIPITOR) 10 MG tablet, TK 1 T PO QD, Disp: , Rfl: 3    cetirizine (zyrTEC) 10 MG tablet, Take 1 tablet by mouth Daily., Disp: , Rfl:     cyclobenzaprine (FLEXERIL) 10 MG tablet, Take 1 tablet by mouth 3 (Three) Times a Day As Needed., Disp: , Rfl:     estradiol (ESTRACE) 0.5 MG tablet, Take  by mouth Daily., Disp: , Rfl:     folic acid-vit B6-vit B12 (FOLTABS) 0.8-10-0.115 MG tablet tablet, Take  by mouth Daily., Disp: , Rfl:     lisinopril-hydrochlorothiazide (PRINZIDE,ZESTORETIC) 10-12.5 MG per tablet, Take 1 tablet by mouth Daily., Disp: , Rfl:     metaxalone (SKELAXIN) 800 MG tablet, TK 1/2 T PO Q 12 H PRN, Disp: , Rfl: 5    Multiple Vitamin (MULTI VITAMIN PO), Take  by mouth Daily., Disp: , Rfl:      Symbicort 160-4.5 MCG/ACT inhaler, Inhale 2 puffs 2 (Two) Times a Day., Disp: , Rfl:     vitamin D3 125 MCG (5000 UT) capsule capsule, Take 1 capsule by mouth Daily., Disp: , Rfl:     Allergies   Allergen Reactions    Azithromycin Other (See Comments)     Chest pain    Lortab [Hydrocodone-Acetaminophen] Itching    Morphine Hives     Social History     Tobacco Use    Smoking status: Never    Smokeless tobacco: Never   Substance Use Topics    Alcohol use: Yes     Comment: Socially     Family History   Problem Relation Age of Onset    Heart disease Mother     Heart disease Father     No Known Problems Sister     Colon cancer Neg Hx     Colon polyps Neg Hx     Esophageal cancer Neg Hx     Liver cancer Neg Hx     Liver disease Neg Hx     Rectal cancer Neg Hx     Stomach cancer Neg Hx      Review of Systems   Cardiovascular:  Positive for chest pain and palpitations. Negative for dyspnea on exertion, leg swelling, orthopnea, paroxysmal nocturnal dyspnea and syncope.   Respiratory:  Negative for cough, shortness of breath and wheezing.    Hematologic/Lymphatic: Negative for bleeding problem. Does not bruise/bleed easily.   Musculoskeletal:  Positive for arthritis and neck pain.   Gastrointestinal:  Negative for abdominal pain, nausea and vomiting.   Neurological:  Positive for dizziness. Negative for light-headedness and weakness.       ECG 12 Lead    Date/Time: 12/19/2024 2:26 PM  Performed by: Julio Tipton MD    Authorized by: Julio Tipton MD  Comparison: compared with previous ECG from 9/23/2021  Similar to previous ECG  Rhythm: sinus rhythm  Rate: normal  BPM: 76  Conduction: left bundle branch block  QRS axis: left    Clinical impression: abnormal EKG           Objective:     Vitals reviewed.   Constitutional:       General: Not in acute distress.     Appearance: Healthy appearance. Well-developed.   Eyes:      Extraocular Movements: Extraocular movements intact.      Pupils: Pupils are  "equal, round, and reactive to light.   HENT:      Head: Normocephalic and atraumatic.    Mouth/Throat:      Pharynx: Oropharynx is clear.   Neck:      Vascular: No carotid bruit or JVD.   Pulmonary:      Effort: Pulmonary effort is normal.      Breath sounds: Normal breath sounds. No wheezing. No rhonchi. No rales.   Cardiovascular:      Normal rate. Regular rhythm.      Murmurs: There is no murmur.      No gallop.    Pulses:     Intact distal pulses.   Edema:     Peripheral edema absent.   Abdominal:      General: Bowel sounds are normal. There is no distension.      Palpations: Abdomen is soft.      Tenderness: There is no abdominal tenderness.   Musculoskeletal: Normal range of motion.      Extremities: No clubbing present.Skin:     General: Skin is warm and dry. There is no cyanosis.      Findings: No erythema or rash.   Neurological:      Mental Status: Alert. Mental status is at baseline.      Cranial Nerves: No cranial nerve deficit.       /80   Pulse 62   Ht 162.6 cm (64\")   Wt 81.6 kg (180 lb)   SpO2 98%   BMI 30.90 kg/m²     Data/Lab Review:     Results for orders placed during the hospital encounter of 01/25/18    Adult Transthoracic Echo Complete W/ Cont if Necessary Per Protocol    Interpretation Summary  · Left ventricular systolic function is normal. Estimated EF appears to be in the range of 56 - 60%.  · Septal wall motion is abnormal, consistent with a bundle branch block.  · Left ventricular wall thickness is consistent with moderate concentric hypertrophy.  · No significant valvular abnormalities.    Cardiac monitor from January 2018:  A relatively benign monitor study.  The predominant rhythm noted during the testing period was sinus rhythm.  Average HR: 78. Min HR: 50. Max HR: 119.  Premature atrial contractions occured occasionally, all as isolated PAC's.  Premature ventricular contractions did not appear during monitoring.         Assessment:          Diagnosis Plan   1. Atypical " chest pain        2. LBBB (left bundle branch block)  ECG 12 Lead      3. Palpitations               Plan:       1.  Atypical chest pain call the patient has had a few episodes of atypical chest pain, usually provoked by cold weather.  At this time, given the lack of symptoms with any other activities and given the fact the patient is generally extremely active with no limitations to activities or exertional chest pain or shortness of breath, would not necessarily feel strongly about any further testing.  We did discuss that if symptoms increase in frequency or severity or start to develop under other circumstances, we can certainly consider a stress test at some point in the future.    2.  Left bundle branch block: This is known for the patient and not a new finding.  No further workup would be needed for this ECG abnormality.    3.  Palpitations: The patient has stable intermittent palpitations.  She has never been noted to have any pathologic arrhythmias.  No testing indicated at this time.    I will plan to see the patient again in approximately 2 years as she is otherwise stable.  Should the patient have any further cardiac issues or concerns between now and her follow-up visit, she would be instructed to call or return sooner than previously planned.

## 2025-01-14 DIAGNOSIS — E55.9 VITAMIN D DEFICIENCY: ICD-10-CM

## 2025-01-14 DIAGNOSIS — E78.00 PURE HYPERCHOLESTEROLEMIA: ICD-10-CM

## 2025-01-14 DIAGNOSIS — I10 ESSENTIAL HYPERTENSION: ICD-10-CM

## 2025-01-14 DIAGNOSIS — R73.01 IFG (IMPAIRED FASTING GLUCOSE): ICD-10-CM

## 2025-01-14 DIAGNOSIS — Z00.00 ANNUAL PHYSICAL EXAM: ICD-10-CM

## 2025-01-14 LAB
25(OH)D3 SERPL-MCNC: 55.6 NG/ML
ALBUMIN SERPL-MCNC: 4.2 G/DL (ref 3.5–5.2)
ALP SERPL-CCNC: 89 U/L (ref 35–104)
ALT SERPL-CCNC: 27 U/L (ref 5–33)
ANION GAP SERPL CALCULATED.3IONS-SCNC: 10 MMOL/L (ref 7–19)
AST SERPL-CCNC: 20 U/L (ref 5–32)
BASOPHILS # BLD: 0 K/UL (ref 0–0.2)
BASOPHILS NFR BLD: 0.3 % (ref 0–1)
BILIRUB SERPL-MCNC: 0.5 MG/DL (ref 0.2–1.2)
BILIRUB UR QL STRIP: NEGATIVE
BUN SERPL-MCNC: 22 MG/DL (ref 6–20)
CALCIUM SERPL-MCNC: 9.4 MG/DL (ref 8.6–10)
CHLORIDE SERPL-SCNC: 103 MMOL/L (ref 98–111)
CHOLEST SERPL-MCNC: 190 MG/DL (ref 0–199)
CLARITY UR: ABNORMAL
CO2 SERPL-SCNC: 27 MMOL/L (ref 22–29)
COLOR UR: YELLOW
CREAT SERPL-MCNC: 0.7 MG/DL (ref 0.5–0.9)
EOSINOPHIL # BLD: 0.2 K/UL (ref 0–0.6)
EOSINOPHIL NFR BLD: 3 % (ref 0–5)
ERYTHROCYTE [DISTWIDTH] IN BLOOD BY AUTOMATED COUNT: 15.9 % (ref 11.5–14.5)
GLUCOSE SERPL-MCNC: 100 MG/DL (ref 70–99)
GLUCOSE UR STRIP.AUTO-MCNC: NEGATIVE MG/DL
HBA1C MFR BLD: 5.3 % (ref 4–5.6)
HCT VFR BLD AUTO: 40.5 % (ref 37–47)
HDLC SERPL-MCNC: 61 MG/DL (ref 40–60)
HGB BLD-MCNC: 12.4 G/DL (ref 12–16)
HGB UR STRIP.AUTO-MCNC: NEGATIVE MG/L
IMM GRANULOCYTES # BLD: 0 K/UL
KETONES UR STRIP.AUTO-MCNC: NEGATIVE MG/DL
LDLC SERPL CALC-MCNC: 107 MG/DL
LEUKOCYTE ESTERASE UR QL STRIP.AUTO: NEGATIVE
LYMPHOCYTES # BLD: 2.4 K/UL (ref 1.1–4.5)
LYMPHOCYTES NFR BLD: 42.6 % (ref 20–40)
MCH RBC QN AUTO: 21.6 PG (ref 27–31)
MCHC RBC AUTO-ENTMCNC: 30.6 G/DL (ref 33–37)
MCV RBC AUTO: 70.7 FL (ref 81–99)
MONOCYTES # BLD: 0.5 K/UL (ref 0–0.9)
MONOCYTES NFR BLD: 9.2 % (ref 0–10)
NEUTROPHILS # BLD: 2.6 K/UL (ref 1.5–7.5)
NEUTS SEG NFR BLD: 44.7 % (ref 50–65)
NITRITE UR QL STRIP.AUTO: NEGATIVE
PH UR STRIP.AUTO: 5.5 [PH] (ref 5–8)
PLATELET # BLD AUTO: 323 K/UL (ref 130–400)
PMV BLD AUTO: 11.1 FL (ref 9.4–12.3)
POTASSIUM SERPL-SCNC: 4.2 MMOL/L (ref 3.5–5)
PROT SERPL-MCNC: 6.9 G/DL (ref 6.4–8.3)
PROT UR STRIP.AUTO-MCNC: NEGATIVE MG/DL
RBC # BLD AUTO: 5.73 M/UL (ref 4.2–5.4)
SODIUM SERPL-SCNC: 140 MMOL/L (ref 136–145)
SP GR UR STRIP.AUTO: 1.03 (ref 1–1.03)
TRIGL SERPL-MCNC: 112 MG/DL (ref 0–149)
TSH SERPL DL<=0.005 MIU/L-ACNC: 3.4 UIU/ML (ref 0.27–4.2)
UROBILINOGEN UR STRIP.AUTO-MCNC: 0.2 E.U./DL
WBC # BLD AUTO: 5.7 K/UL (ref 4.8–10.8)

## 2025-01-14 ASSESSMENT — PATIENT HEALTH QUESTIONNAIRE - PHQ9
9. THOUGHTS THAT YOU WOULD BE BETTER OFF DEAD, OR OF HURTING YOURSELF: NOT AT ALL
SUM OF ALL RESPONSES TO PHQ9 QUESTIONS 1 & 2: 2
4. FEELING TIRED OR HAVING LITTLE ENERGY: SEVERAL DAYS
7. TROUBLE CONCENTRATING ON THINGS, SUCH AS READING THE NEWSPAPER OR WATCHING TELEVISION: SEVERAL DAYS
6. FEELING BAD ABOUT YOURSELF - OR THAT YOU ARE A FAILURE OR HAVE LET YOURSELF OR YOUR FAMILY DOWN: NOT AT ALL
SUM OF ALL RESPONSES TO PHQ QUESTIONS 1-9: 7
5. POOR APPETITE OR OVEREATING: SEVERAL DAYS
5. POOR APPETITE OR OVEREATING: SEVERAL DAYS
SUM OF ALL RESPONSES TO PHQ QUESTIONS 1-9: 7
6. FEELING BAD ABOUT YOURSELF - OR THAT YOU ARE A FAILURE OR HAVE LET YOURSELF OR YOUR FAMILY DOWN: NOT AT ALL
2. FEELING DOWN, DEPRESSED OR HOPELESS: SEVERAL DAYS
4. FEELING TIRED OR HAVING LITTLE ENERGY: SEVERAL DAYS
3. TROUBLE FALLING OR STAYING ASLEEP: SEVERAL DAYS
8. MOVING OR SPEAKING SO SLOWLY THAT OTHER PEOPLE COULD HAVE NOTICED. OR THE OPPOSITE - BEING SO FIDGETY OR RESTLESS THAT YOU HAVE BEEN MOVING AROUND A LOT MORE THAN USUAL: SEVERAL DAYS
7. TROUBLE CONCENTRATING ON THINGS, SUCH AS READING THE NEWSPAPER OR WATCHING TELEVISION: SEVERAL DAYS
10. IF YOU CHECKED OFF ANY PROBLEMS, HOW DIFFICULT HAVE THESE PROBLEMS MADE IT FOR YOU TO DO YOUR WORK, TAKE CARE OF THINGS AT HOME, OR GET ALONG WITH OTHER PEOPLE: NOT DIFFICULT AT ALL
10. IF YOU CHECKED OFF ANY PROBLEMS, HOW DIFFICULT HAVE THESE PROBLEMS MADE IT FOR YOU TO DO YOUR WORK, TAKE CARE OF THINGS AT HOME, OR GET ALONG WITH OTHER PEOPLE: NOT DIFFICULT AT ALL
8. MOVING OR SPEAKING SO SLOWLY THAT OTHER PEOPLE COULD HAVE NOTICED. OR THE OPPOSITE, BEING SO FIGETY OR RESTLESS THAT YOU HAVE BEEN MOVING AROUND A LOT MORE THAN USUAL: SEVERAL DAYS
SUM OF ALL RESPONSES TO PHQ QUESTIONS 1-9: 7
1. LITTLE INTEREST OR PLEASURE IN DOING THINGS: SEVERAL DAYS
3. TROUBLE FALLING OR STAYING ASLEEP: SEVERAL DAYS
9. THOUGHTS THAT YOU WOULD BE BETTER OFF DEAD, OR OF HURTING YOURSELF: NOT AT ALL
SUM OF ALL RESPONSES TO PHQ QUESTIONS 1-9: 7
1. LITTLE INTEREST OR PLEASURE IN DOING THINGS: SEVERAL DAYS
SUM OF ALL RESPONSES TO PHQ QUESTIONS 1-9: 7
2. FEELING DOWN, DEPRESSED OR HOPELESS: SEVERAL DAYS

## 2025-01-15 ENCOUNTER — OFFICE VISIT (OUTPATIENT)
Dept: INTERNAL MEDICINE | Age: 60
End: 2025-01-15

## 2025-01-15 VITALS
WEIGHT: 183.4 LBS | HEART RATE: 75 BPM | SYSTOLIC BLOOD PRESSURE: 112 MMHG | DIASTOLIC BLOOD PRESSURE: 76 MMHG | HEIGHT: 65 IN | OXYGEN SATURATION: 98 % | BODY MASS INDEX: 30.56 KG/M2

## 2025-01-15 DIAGNOSIS — Z00.00 ANNUAL PHYSICAL EXAM: ICD-10-CM

## 2025-01-15 DIAGNOSIS — E78.00 PURE HYPERCHOLESTEROLEMIA: ICD-10-CM

## 2025-01-15 DIAGNOSIS — R73.01 IFG (IMPAIRED FASTING GLUCOSE): ICD-10-CM

## 2025-01-15 DIAGNOSIS — E55.9 VITAMIN D DEFICIENCY: Primary | ICD-10-CM

## 2025-01-15 DIAGNOSIS — I10 ESSENTIAL HYPERTENSION: ICD-10-CM

## 2025-01-15 RX ORDER — ACYCLOVIR 800 MG/1
TABLET ORAL
Qty: 90 TABLET | Refills: 2 | Status: SHIPPED | OUTPATIENT
Start: 2025-01-15

## 2025-01-15 RX ORDER — ATORVASTATIN CALCIUM 10 MG/1
10 TABLET, FILM COATED ORAL DAILY
Qty: 90 TABLET | Refills: 1 | Status: SHIPPED | OUTPATIENT
Start: 2025-01-15

## 2025-01-15 RX ORDER — ESTRADIOL 1 MG/1
1 TABLET ORAL DAILY
Qty: 90 TABLET | Refills: 1 | Status: SHIPPED | OUTPATIENT
Start: 2025-01-15

## 2025-01-15 SDOH — ECONOMIC STABILITY: FOOD INSECURITY: WITHIN THE PAST 12 MONTHS, YOU WORRIED THAT YOUR FOOD WOULD RUN OUT BEFORE YOU GOT MONEY TO BUY MORE.: NEVER TRUE

## 2025-01-15 SDOH — ECONOMIC STABILITY: FOOD INSECURITY: WITHIN THE PAST 12 MONTHS, THE FOOD YOU BOUGHT JUST DIDN'T LAST AND YOU DIDN'T HAVE MONEY TO GET MORE.: NEVER TRUE

## 2025-01-15 ASSESSMENT — ENCOUNTER SYMPTOMS
WHEEZING: 0
ABDOMINAL PAIN: 0
CONSTIPATION: 0
COUGH: 0
SORE THROAT: 0
CHEST TIGHTNESS: 0

## 2025-01-15 NOTE — PROGRESS NOTES
01/14/2025 15.9 (H)     Platelets 01/14/2025 323     MPV 01/14/2025 11.1     Neutrophils % 01/14/2025 44.7 (L)     Lymphocytes % 01/14/2025 42.6 (H)     Monocytes % 01/14/2025 9.2     Eosinophils % 01/14/2025 3.0     Basophils % 01/14/2025 0.3     Neutrophils Absolute 01/14/2025 2.6     Immature Granulocytes # 01/14/2025 0.0     Lymphocytes Absolute 01/14/2025 2.4     Monocytes Absolute 01/14/2025 0.50     Eosinophils Absolute 01/14/2025 0.20     Basophils Absolute 01/14/2025 0.00     Sodium 01/14/2025 140     Potassium 01/14/2025 4.2     Chloride 01/14/2025 103     CO2 01/14/2025 27     Anion Gap 01/14/2025 10     Glucose 01/14/2025 100 (H)     BUN 01/14/2025 22 (H)     Creatinine 01/14/2025 0.7     Est, Glom Filt Rate 01/14/2025 >90     Calcium 01/14/2025 9.4     Total Protein 01/14/2025 6.9     Albumin 01/14/2025 4.2     Total Bilirubin 01/14/2025 0.5     Alkaline Phosphatase 01/14/2025 89     ALT 01/14/2025 27     AST 01/14/2025 20     Hemoglobin A1C 01/14/2025 5.3     Cholesterol, Total 01/14/2025 190     Triglycerides 01/14/2025 112     HDL 01/14/2025 61 (H)     LDL Cholesterol 01/14/2025 107 (H)            ASSESSMENT/PLAN:  Annual physical exam  *Screening for colorectal cancer 2/2022- repeat 5 yrs  * mammogram- 3/2023  * PAP 10/2019-has appt with GYN  * BD-2/ 2022    Essential hypertension-  Readings reviewed  rx lisinopril/ hctz 10/12.5     IFG (impaired fasting glucose)  Continue good diet efforts,   A1c is now normal   A1c 5.3     Pure hypercholesterolemia-  I have personally reviewed and interpreted these lab results and thoroughly discussed with patient  Cholesterol, Total 01/14/2025 190    Triglycerides 01/14/2025 112    HDL 01/14/2025 61 (H)    LDL Cholesterol 01/14/2025 107 (H)    RX Lipitor 10 mg daily  Healthy, mostly fiber rich nonstarchy plant-based diet recommended  Recommend to decrease intake of processed foods, simple carbohydrates and animal-based products that high in saturated fats     Vit

## 2025-02-13 RX ORDER — LISINOPRIL AND HYDROCHLOROTHIAZIDE 10; 12.5 MG/1; MG/1
1 TABLET ORAL DAILY
Qty: 90 TABLET | Refills: 1 | Status: SHIPPED | OUTPATIENT
Start: 2025-02-13

## 2025-02-25 ENCOUNTER — OFFICE VISIT (OUTPATIENT)
Dept: OBGYN CLINIC | Age: 60
End: 2025-02-25
Payer: COMMERCIAL

## 2025-02-25 VITALS
SYSTOLIC BLOOD PRESSURE: 136 MMHG | DIASTOLIC BLOOD PRESSURE: 80 MMHG | HEART RATE: 87 BPM | BODY MASS INDEX: 30.76 KG/M2 | WEIGHT: 182 LBS

## 2025-02-25 DIAGNOSIS — Z12.39 ENCOUNTER FOR SCREENING BREAST EXAMINATION: ICD-10-CM

## 2025-02-25 DIAGNOSIS — Z78.0 POSTMENOPAUSAL: ICD-10-CM

## 2025-02-25 DIAGNOSIS — Z12.31 ENCOUNTER FOR SCREENING MAMMOGRAM FOR MALIGNANT NEOPLASM OF BREAST: ICD-10-CM

## 2025-02-25 DIAGNOSIS — Z01.419 WELL WOMAN EXAM WITH ROUTINE GYNECOLOGICAL EXAM: ICD-10-CM

## 2025-02-25 DIAGNOSIS — N39.46 MIXED INCONTINENCE URGE AND STRESS: ICD-10-CM

## 2025-02-25 DIAGNOSIS — Z76.89 ENCOUNTER TO ESTABLISH CARE: Primary | ICD-10-CM

## 2025-02-25 PROCEDURE — 3075F SYST BP GE 130 - 139MM HG: CPT | Performed by: NURSE PRACTITIONER

## 2025-02-25 PROCEDURE — 99386 PREV VISIT NEW AGE 40-64: CPT | Performed by: NURSE PRACTITIONER

## 2025-02-25 PROCEDURE — 99213 OFFICE O/P EST LOW 20 MIN: CPT | Performed by: NURSE PRACTITIONER

## 2025-02-25 PROCEDURE — 3079F DIAST BP 80-89 MM HG: CPT | Performed by: NURSE PRACTITIONER

## 2025-02-25 ASSESSMENT — PATIENT HEALTH QUESTIONNAIRE - PHQ9
SUM OF ALL RESPONSES TO PHQ QUESTIONS 1-9: 0
1. LITTLE INTEREST OR PLEASURE IN DOING THINGS: NOT AT ALL
2. FEELING DOWN, DEPRESSED OR HOPELESS: NOT AT ALL
SUM OF ALL RESPONSES TO PHQ9 QUESTIONS 1 & 2: 0

## 2025-02-25 ASSESSMENT — ENCOUNTER SYMPTOMS
DIARRHEA: 0
RESPIRATORY NEGATIVE: 1
GASTROINTESTINAL NEGATIVE: 1
EYES NEGATIVE: 1
CONSTIPATION: 0
ALLERGIC/IMMUNOLOGIC NEGATIVE: 1

## 2025-02-25 NOTE — PROGRESS NOTES
Pt presents today to establish care and for pap smear and breast exam.  She is wanting to discuss a vaginal prolapse states things feel different. She was seeing Dr Goodson before he retired. She scored a 0 on her depression screening. She states she retires in 2 days.     Mammo:  Pap smear:been a while   Contraception:hyst    P:0  Ab:0  Bone density:  Colonoscopy:  
and DEXA    Assessment & Plan  1. Mixed urinary incontinence.  She reports symptoms of both stress and urge incontinence, including leaking when coughing, sneezing, and on the way to the bathroom. Kegel exercises have not been effective. A referral for pelvic floor physical therapy has been initiated to strengthen the pelvic muscles.    2. Menopause.  She is currently on hormone replacement therapy (Estrace) and has been advised of increased CVD risk with taking >60 years old. Options include taking the medication every other day or halving the dose to wean down if symptoms allow.     3. Health maintenance.  An order for a mammogram has been placed and sent to [facility]. A bone density test has also been ordered, as her last test was at age 50. She is advised to continue with weight-resistant exercises and collagen supplements, calcium and Vitamin D to maintain bone strength.    Follow-up  The patient will follow up in 1 year or sooner with any problems          There are no Patient Instructions on file for this visit.

## 2025-02-28 ENCOUNTER — PATIENT MESSAGE (OUTPATIENT)
Dept: INTERNAL MEDICINE | Age: 60
End: 2025-02-28

## 2025-02-28 DIAGNOSIS — Z23 NEED FOR VACCINATION: Primary | ICD-10-CM

## 2025-03-11 ENCOUNTER — RESULTS FOLLOW-UP (OUTPATIENT)
Dept: OBGYN CLINIC | Age: 60
End: 2025-03-11

## 2025-05-19 ENCOUNTER — HOSPITAL ENCOUNTER (OUTPATIENT)
Dept: OCCUPATIONAL THERAPY | Age: 60
Setting detail: THERAPIES SERIES
Discharge: HOME | End: 2025-05-19
Payer: COMMERCIAL

## 2025-05-19 DIAGNOSIS — N39.46 MIXED INCONTINENCE URGE AND STRESS (MALE)(FEMALE): Primary | ICD-10-CM

## 2025-05-19 PROCEDURE — 97167 OT EVAL HIGH COMPLEX 60 MIN: CPT

## 2025-05-19 NOTE — PROGRESS NOTES
Occupational Therapy: Initial Evaluation    Patient: Halima Chan (59 y.o. female)   Examination Date: 2025  Plan of Care Certification Period:2025 to 25      :  1965 MRN: [id number]  CSN: [id number]   Insurance: Payor: [place] [facility] / Plan: [facility] / Product Type: *No Product type* /   Insurance ID: [id number] - ([facility]) Secondary Insurance (if applicable):    Referring Physician: Keiko Butler, JENNIFER - RADHA Butler   PCP: Trice Sherman MD Visits to Date/Visits Approved: 1 /      No Show/Cancelled Appts:   /       Medical Diagnosis: Mixed incontinence [N39.46]    Treatment Diagnosis: Performance deficits / Impairments: Decreased coordination, Decreased ADL status, Decreased strength, Decreased endurance     PERTINENT MEDICAL HISTORY      Self reported health status:: Good    Medical History:     Past Medical History:   Diagnosis Date    Acid reflux     Allergic rhinitis     Asthma     Asthma exacerbation 2017    Bronchitis     Chronic back pain     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HCC)     Essential hypertension     Gastroesophageal reflux disease without esophagitis     Herpes simplex     Hyperlipidemia     Irregular heart rhythm     Neck pain, chronic     PFO (patent foramen ovale)      Surgical History:  Past Surgical History:   Procedure Laterality Date    BREAST SURGERY      CHOLECYSTECTOMY      COLONOSCOPY      -has every 5 yrs    HYSTERECTOMY (CERVIX STATUS UNKNOWN)      HYSTERECTOMY, VAGINAL      one ovary remians    OVARY REMOVAL Right        Medications:   Current Outpatient Medications:     lisinopril-hydroCHLOROthiazide (PRINZIDE;ZESTORETIC) 10-12.5 MG per tablet, TAKE 1 TABLET BY MOUTH DAILY, Disp: 90 tablet, Rfl: 1    acyclovir (ZOVIRAX) 800 MG tablet, TK 1 T PO D, Disp: 90 tablet, Rfl: 2    estradiol (ESTRACE) 1 MG tablet, Take 1 tablet by mouth daily, Disp: 90 tablet, Rfl: 1    atorvastatin (LIPITOR) 10 MG tablet, Take 1 tablet by mouth daily, Disp: 90 tablet,

## 2025-05-21 ENCOUNTER — HOSPITAL ENCOUNTER (OUTPATIENT)
Dept: OCCUPATIONAL THERAPY | Age: 60
Setting detail: THERAPIES SERIES
Discharge: HOME | End: 2025-05-21
Payer: COMMERCIAL

## 2025-05-21 PROCEDURE — 97110 THERAPEUTIC EXERCISES: CPT

## 2025-05-21 PROCEDURE — 97530 THERAPEUTIC ACTIVITIES: CPT

## 2025-05-21 NOTE — PROGRESS NOTES
Occupational Therapy: Daily Treatment Note    Patient: Halima Chan (59 y.o. female)   Examination Date:    Plan of Care Certification Period:  to Certification Period Expiration Date: 25   :  1965 MRN: [id number]  CSN: [id number]   Insurance: Payor: [employer] / Plan: [employer] -  [place] / Product Type: *No Product type* /   Insurance ID: [id number] - ([facility]) Secondary Insurance (if applicable):    Referring Physician: Keiko Butler APRN - RADHA Butler   PCP: Trice Sherman MD Visits to Date/Visits Approved: 2 /       Medical Diagnosis: Mixed incontinence [N39.46]    Treatment Diagnosis: Treatment Diagnosis: decreased muscle strength, endurance and coordination    Prognosis: Good    Subjective  Subjective: I feel like I can find that muscle (ta) better when I'm standing.     Treatment Activities     Exercises:   OT Exercise 1: Supine legs up wall x 3 minutes  OT Exercise 2: yoga stretch x 2 minutes  OT Exercise 3: praying stretch on stool x 2 minutes  OT Exercise 4: Supine transverse abdominis contraction alone; able to initiate contraction with extra time and sustain 6-8 seconds but unable to breathe and hold contraction  OT Exercise 9: Sit to stand with ta contraction and exhale when initiating standing up 1 x 10  OT Exercise 11: Supine pfm contraction x 10 seconds on and 10 seconds off in supine hooklying alone; completed 5 reps; able to burst up to 15 and sustain 10/15 for 4-6 seconds, then drops to 6/8/10; relaxes down to 1/2/3; cues to breathe  OT Exercise 12: Supine pfm contraction x 10 seconds on and 10 seconds off in supine hooklying with wedge for gravity assist; able to burst up to 15 and sustain 8/10/15 for first 5 reps, then 6/8/10 for last 5 reps; relaxes down to 1/2/3; cues to breathe  OT Exercise 13: Supine pfm contraction with yoga block for hip adduction for recruitment 10 x 10 seconds in supine hooklying with wedge for gravity assist; pt able to burst up to 20 and sustain 10/15/10,

## 2025-05-29 ENCOUNTER — PATIENT MESSAGE (OUTPATIENT)
Dept: INTERNAL MEDICINE | Age: 60
End: 2025-05-29

## 2025-05-29 RX ORDER — METAXALONE 800 MG/1
800 TABLET ORAL DAILY PRN
Qty: 30 TABLET | Refills: 1 | Status: SHIPPED | OUTPATIENT
Start: 2025-05-29 | End: 2025-06-28

## 2025-06-02 ENCOUNTER — HOSPITAL ENCOUNTER (OUTPATIENT)
Dept: OCCUPATIONAL THERAPY | Age: 60
Setting detail: THERAPIES SERIES
Discharge: HOME | End: 2025-06-02
Payer: COMMERCIAL

## 2025-06-02 PROCEDURE — 97110 THERAPEUTIC EXERCISES: CPT

## 2025-06-02 PROCEDURE — 97530 THERAPEUTIC ACTIVITIES: CPT

## 2025-06-04 ENCOUNTER — HOSPITAL ENCOUNTER (OUTPATIENT)
Dept: OCCUPATIONAL THERAPY | Age: 60
Setting detail: THERAPIES SERIES
Discharge: HOME | End: 2025-06-04
Payer: COMMERCIAL

## 2025-06-04 PROCEDURE — 97110 THERAPEUTIC EXERCISES: CPT

## 2025-06-04 PROCEDURE — 97530 THERAPEUTIC ACTIVITIES: CPT

## 2025-06-04 NOTE — PROGRESS NOTES
to have PERF score of 3+/10/15/20  LTG Goal 1 Status:: New  Long Term Goal 2: Pt to report decreased overall urinary frequency by 75%  LTG Goal 2 Status:: New  Long Term Goal 3: Pt to report decreased overall stress urinary incontinence by 75%  LTG Goal 3 Status:: New  Long Term Goal 4: Pt to report decreased overall urinary urgency by 75%  LTG Goal 4 Status:: New  Long Term Goal 5: Pt to be independent with comprehensive/finalized HEP  LTG Goal 5 Status:: New      06/04/25 0908   OT Education   OT Education OT Role;Plan of Care;Home Exercise Program;Insurance;Evaluative findings;Anatomy of condition;Goal setting   Patient Education written information provided re: normal bladder function vs bladder dysfunction, pfm exercises in gravity eliminated, bladder diary provided and education, education re: ta contraction for core strengthening   Barriers impacting rehab  None   Measures taken to address barrier(s) N/A   Current home exercise program (HEP) ta contraction-perform in standing if she feels more effective, supine pfm with wedge for gravity assist, bladder diary     TREATMENT PLAN   Plan  Plan Frequency: 2x/week  Plan Weeks: 12 weeks  Current Treatment Recommendations: Strengthening, Endurance training, Patient/Caregiver education & training, Modalities, Self-Care / ADL, Coordination training  Modalities:  (biofeedback)        Electronically signed by COLLEEN GARCIA/L, at 11:56 AM on 6/4/2025.

## 2025-06-05 ENCOUNTER — TRANSCRIBE ORDERS (OUTPATIENT)
Dept: ADMINISTRATIVE | Facility: HOSPITAL | Age: 60
End: 2025-06-05
Payer: COMMERCIAL

## 2025-06-05 ENCOUNTER — OFFICE VISIT (OUTPATIENT)
Dept: INTERNAL MEDICINE | Age: 60
End: 2025-06-05
Payer: COMMERCIAL

## 2025-06-05 VITALS
WEIGHT: 179 LBS | HEIGHT: 65 IN | BODY MASS INDEX: 29.82 KG/M2 | SYSTOLIC BLOOD PRESSURE: 120 MMHG | OXYGEN SATURATION: 98 % | HEART RATE: 82 BPM | DIASTOLIC BLOOD PRESSURE: 80 MMHG

## 2025-06-05 DIAGNOSIS — Q21.12 PFO (PATENT FORAMEN OVALE): ICD-10-CM

## 2025-06-05 DIAGNOSIS — I10 ESSENTIAL HYPERTENSION: ICD-10-CM

## 2025-06-05 DIAGNOSIS — E78.00 PURE HYPERCHOLESTEROLEMIA: ICD-10-CM

## 2025-06-05 DIAGNOSIS — R93.1 ELEVATED CORONARY ARTERY CALCIUM SCORE: ICD-10-CM

## 2025-06-05 DIAGNOSIS — R93.1 AGATSTON CORONARY ARTERY CALCIUM SCORE BETWEEN 200 AND 399: Primary | ICD-10-CM

## 2025-06-05 DIAGNOSIS — Z13.6 ENCOUNTER FOR SCREENING FOR VASCULAR DISEASE: Primary | ICD-10-CM

## 2025-06-05 PROCEDURE — 99214 OFFICE O/P EST MOD 30 MIN: CPT | Performed by: INTERNAL MEDICINE

## 2025-06-05 PROCEDURE — 3074F SYST BP LT 130 MM HG: CPT | Performed by: INTERNAL MEDICINE

## 2025-06-05 PROCEDURE — 3079F DIAST BP 80-89 MM HG: CPT | Performed by: INTERNAL MEDICINE

## 2025-06-05 RX ORDER — ATORVASTATIN CALCIUM 40 MG/1
40 TABLET, FILM COATED ORAL DAILY
Qty: 90 TABLET | Refills: 1 | Status: SHIPPED | OUTPATIENT
Start: 2025-06-05

## 2025-06-05 RX ORDER — EZETIMIBE 10 MG/1
10 TABLET ORAL DAILY
Qty: 90 TABLET | Refills: 1 | Status: SHIPPED | OUTPATIENT
Start: 2025-06-05

## 2025-06-05 ASSESSMENT — ENCOUNTER SYMPTOMS
CONSTIPATION: 0
COUGH: 0
SORE THROAT: 0
ABDOMINAL PAIN: 0
WHEEZING: 0
CHEST TIGHTNESS: 0

## 2025-06-05 NOTE — PROGRESS NOTES
Chief Complaint   Patient presents with    Other     Discuss ct calcium results      History of presenting illness:  Halima Chan is a59 y.o. female who presents today for follow up on her chronic medical conditions as noted below.      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    Elevated transaminase level 11/07/2017    PFO (patent foramen ovale) 11/07/2017     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HCC)     Essential hypertension     Gastroesophageal reflux disease without esophagitis     Herpes simplex     Hyperlipidemia     Irregular heart rhythm     Neck pain, chronic     PFO (patent foramen ovale)       Past Surgical History:   Procedure Laterality Date    BREAST SURGERY      CHOLECYSTECTOMY      COLONOSCOPY      2023-has every 5 yrs    HYSTERECTOMY (CERVIX STATUS UNKNOWN)  2014    HYSTERECTOMY, VAGINAL      one ovary remians    OVARY REMOVAL Right 2014     Current Outpatient Medications   Medication Sig Dispense Refill    metaxalone (SKELAXIN) 800 MG tablet Take 1 tablet by mouth daily as needed for Pain (lower back pain) 30 tablet 1    lisinopril-hydroCHLOROthiazide (PRINZIDE;ZESTORETIC) 10-12.5 MG per tablet TAKE 1 TABLET BY MOUTH DAILY 90 tablet 1    acyclovir (ZOVIRAX) 800 MG tablet TK 1 T PO D 90 tablet 2    estradiol (ESTRACE) 1 MG tablet Take 1 tablet by mouth daily 90

## 2025-06-06 ENCOUNTER — TELEPHONE (OUTPATIENT)
Dept: CARDIOLOGY | Facility: CLINIC | Age: 60
End: 2025-06-06
Payer: COMMERCIAL

## 2025-06-06 NOTE — TELEPHONE ENCOUNTER
Caller: Marichuy Mosqueda    Relationship to patient: Self    Best call back number:   Telephone Information:   Mobile [phone]     Chief complaint: CALCIUM SCORE  (DONE 5-14-25), SOB, HEAVINESS IN CHEST COMES AND GOES, EXTREME FATIGUE    Type of visit: FOLLOW UP    Requested date: ASAP     Additional notes:PCP TOLD HER TO CALL AND GET IN ASAP       SHE HAS A COPY OF THE REPORT.

## 2025-06-09 ENCOUNTER — HOSPITAL ENCOUNTER (OUTPATIENT)
Dept: OCCUPATIONAL THERAPY | Age: 60
Setting detail: THERAPIES SERIES
Discharge: HOME | End: 2025-06-09
Payer: COMMERCIAL

## 2025-06-09 PROCEDURE — 97110 THERAPEUTIC EXERCISES: CPT

## 2025-06-09 PROCEDURE — 97530 THERAPEUTIC ACTIVITIES: CPT

## 2025-06-09 NOTE — PROGRESS NOTES
contraction-perform in standing if she feels more effective, supine pfm without wedge; pt prefers to perform pfm exercises in sidelying at times which is fine     TREATMENT PLAN   Plan  Plan Frequency: 2x/week  Plan Weeks: 12 weeks  Current Treatment Recommendations: Strengthening, Endurance training, Patient/Caregiver education & training, Modalities, Self-Care / ADL, Coordination training  Modalities:  (biofeedback)      Electronically signed by COLLEEN GARCIA/L, at 10:00 AM on 6/9/2025.

## 2025-06-11 ENCOUNTER — HOSPITAL ENCOUNTER (OUTPATIENT)
Dept: OCCUPATIONAL THERAPY | Age: 60
Setting detail: THERAPIES SERIES
Discharge: HOME | End: 2025-06-11
Payer: COMMERCIAL

## 2025-06-11 PROCEDURE — 97530 THERAPEUTIC ACTIVITIES: CPT

## 2025-06-11 PROCEDURE — 97110 THERAPEUTIC EXERCISES: CPT

## 2025-06-11 NOTE — PROGRESS NOTES
Occupational Therapy: Daily Treatment Note    Patient: Halima Chan (59 y.o. female)   Examination Date:    Plan of Care Certification Period:  to Certification Period Expiration Date: 25   :  1965 MRN: [id number]  CSN: [id number]   Insurance: Payor: [employer] / Plan: [facility]  [place] / Product Type: *No Product type* /   Insurance ID: [id number] - ([facility]) Secondary Insurance (if applicable):    Referring Physician: Keiko Butler APRN - RADHA Butler   PCP: Trice Sherman MD Visits to Date/Visits Approved:  /       Medical Diagnosis: Mixed incontinence [N39.46]    Treatment Diagnosis: Treatment Diagnosis: decreased muscle strength, endurance and coordination    Prognosis: Good    Subjective  Subjective: I still can't believe how much this is already helping.     Treatment Activities     Exercises:   OT Exercise 1: Supine legs up wall x 3 minutes  OT Exercise 3: praying stretch on stool x 2 minutes  OT Exercise 4: Seated transverse abdominis contraction alone; performed seated on red therapy ball x 15 reps --UPGRADE  OT Exercise 5: Seated transverse abdominis contraction alternate UE 1 x 10; completes 15 consecutive reps; performed seated on red therapy ball x 10 reps --UPGRADE  OT Exercise 6: Seated transverse abdominis contraction alternate LE 1 x 10; completes 15 consecutive reps; performed seated on red therapy ball x 10 reps --UPGRADE  OT Exercise 7: Seated transverse abdominis contraction alternate UE/LE 1 x 15; completes 15 consecutive reps; performed seated on red therapy ball x 10 reps --UPGRADE  OT Exercise 8: Supine transverse abdominis contraction with bridge 1 x 10; completes 10 consecutive reps  OT Exercise 9: Sit to stand with ta contraction and exhale when initiating standing up 1 x 10; demonstrates a good understanding; performed seated on red therapy ball x 10 reps --UPGRADE  OT Exercise 10: Downtraining with biofeedback; pt able to relax down to 2/3 prior to pfm exercises and 2/3

## 2025-06-13 ENCOUNTER — OFFICE VISIT (OUTPATIENT)
Dept: CARDIOLOGY | Facility: CLINIC | Age: 60
End: 2025-06-13
Payer: COMMERCIAL

## 2025-06-13 VITALS
HEART RATE: 77 BPM | BODY MASS INDEX: 29.71 KG/M2 | WEIGHT: 174 LBS | HEIGHT: 64 IN | DIASTOLIC BLOOD PRESSURE: 72 MMHG | OXYGEN SATURATION: 99 % | SYSTOLIC BLOOD PRESSURE: 124 MMHG

## 2025-06-13 DIAGNOSIS — I25.10 CORONARY ARTERY CALCIFICATION SEEN ON CT SCAN: Primary | ICD-10-CM

## 2025-06-13 DIAGNOSIS — R06.02 SOB (SHORTNESS OF BREATH): ICD-10-CM

## 2025-06-13 DIAGNOSIS — R07.89 ATYPICAL CHEST PAIN: ICD-10-CM

## 2025-06-13 PROCEDURE — 99214 OFFICE O/P EST MOD 30 MIN: CPT | Performed by: INTERNAL MEDICINE

## 2025-06-13 PROCEDURE — 93000 ELECTROCARDIOGRAM COMPLETE: CPT | Performed by: INTERNAL MEDICINE

## 2025-06-13 RX ORDER — ATORVASTATIN CALCIUM 40 MG/1
40 TABLET, FILM COATED ORAL DAILY
COMMUNITY

## 2025-06-13 RX ORDER — EZETIMIBE 10 MG/1
10 TABLET ORAL DAILY
COMMUNITY

## 2025-06-13 NOTE — PROGRESS NOTES
Subjective:     Encounter Date:06/13/2025      Patient ID: Marichuy Mosqueda is a 59 y.o. female with intermittent palpitations, left bundle branch block, patent foramen ovale, presenting for further evaluation after recent abnormal CT calcium score.    Chief Complaint: Fatigue, shortness of breath    History of Present Illness    This patient presents today for follow-up.  Recently, she had an elevated CT coronary artery calcium score.  This was ordered as she has been having some increasing fatigue over recent months.  She also has been having some chest pressure which she initially thought was indigestion.  She would take something like Prilosec and this seemed to help immediately, however the discomfort would come back throughout the day, not related to food.  The patient also notes when she is walking, she feels short of breath and winded at times.  This has increased over recent months.  She states she has gained some weight.  She also notes snoring at this time which she used to not do.  She wakes up and feels like she is having to hold her breath at times.  She denies having lower extremity edema.  No palpitations, lightheadedness, dizziness or syncope.  For these reasons, she did undergo the calcium score which was elevated.  Since that time, her cholesterol medicine has been altered and she has made dietary lifestyle changes and already feels better.        Current Outpatient Medications:     acyclovir (ZOVIRAX) 200 MG capsule, Take  by mouth Every 4 (Four) Hours While Awake., Disp: , Rfl:     albuterol (PROVENTIL) (2.5 MG/3ML) 0.083% nebulizer solution, Take 2.5 mg by nebulization Every 4 (Four) Hours As Needed for Wheezing., Disp: , Rfl:     aspirin 81 MG EC tablet, Take 1 tablet by mouth Daily., Disp: , Rfl:     atorvastatin (LIPITOR) 40 MG tablet, Take 1 tablet by mouth Daily., Disp: , Rfl:     cetirizine (zyrTEC) 10 MG tablet, Take 1 tablet by mouth Daily., Disp: , Rfl:     cyclobenzaprine  (FLEXERIL) 10 MG tablet, Take 1 tablet by mouth 3 (Three) Times a Day As Needed., Disp: , Rfl:     estradiol (ESTRACE) 0.5 MG tablet, Take  by mouth Daily., Disp: , Rfl:     ezetimibe (Zetia) 10 MG tablet, Take 1 tablet by mouth Daily., Disp: , Rfl:     folic acid-vit B6-vit B12 (FOLTABS) 0.8-10-0.115 MG tablet tablet, Take  by mouth Daily., Disp: , Rfl:     lisinopril-hydrochlorothiazide (PRINZIDE,ZESTORETIC) 10-12.5 MG per tablet, Take 1 tablet by mouth Daily., Disp: , Rfl:     metaxalone (SKELAXIN) 800 MG tablet, TK 1/2 T PO Q 12 H PRN, Disp: , Rfl: 5    Multiple Vitamin (MULTI VITAMIN PO), Take  by mouth Daily., Disp: , Rfl:     Symbicort 160-4.5 MCG/ACT inhaler, Inhale 2 puffs 2 (Two) Times a Day., Disp: , Rfl:     vitamin D3 125 MCG (5000 UT) capsule capsule, Take 1 capsule by mouth Daily., Disp: , Rfl:     Allergies   Allergen Reactions    Azithromycin Other (See Comments)     Chest pain    Lortab [Hydrocodone-Acetaminophen] Itching    Morphine Hives     Social History     Tobacco Use    Smoking status: Never    Smokeless tobacco: Never   Substance Use Topics    Alcohol use: Yes     Comment: Socially     Review of Systems   Constitutional: Positive for malaise/fatigue and weight gain.   Cardiovascular:  Positive for chest pain and dyspnea on exertion. Negative for leg swelling, orthopnea, palpitations, paroxysmal nocturnal dyspnea and syncope.   Respiratory:  Positive for shortness of breath. Negative for cough and wheezing.    Gastrointestinal:  Negative for abdominal pain, nausea and vomiting.   Neurological:  Negative for dizziness and light-headedness.         ECG 12 Lead    Date/Time: 6/13/2025 9:20 AM  Performed by: Julio Tipton MD    Authorized by: Julio Tipton MD  Comparison: compared with previous ECG from 12/19/2024  Similar to previous ECG  Rhythm: sinus rhythm  Ectopy: atrial premature contractions  Rate: normal  BPM: 86  Conduction: left bundle branch block  QRS axis:  "left    Clinical impression: abnormal EKG           Objective:     Vitals reviewed.   Constitutional:       General: Not in acute distress.     Appearance: Well-developed and not in distress.   HENT:      Head: Normocephalic and atraumatic.   Neck:      Vascular: No carotid bruit or JVD.   Pulmonary:      Effort: Pulmonary effort is normal.      Breath sounds: Normal breath sounds. No wheezing. No rhonchi. No rales.   Cardiovascular:      Normal rate. Regular rhythm.      Murmurs: There is no murmur.      No gallop.    Pulses:     Intact distal pulses.   Edema:     Peripheral edema absent.   Abdominal:      General: Bowel sounds are normal. There is no distension.      Palpations: Abdomen is soft.      Tenderness: There is no abdominal tenderness.   Skin:     General: Skin is warm and dry. There is no cyanosis.      Findings: No erythema or rash.   Neurological:      Mental Status: Alert. Mental status is at baseline.       /72   Pulse 77   Ht 162.6 cm (64\")   Wt 78.9 kg (174 lb)   SpO2 99%   BMI 29.87 kg/m²     Data/Lab Review:     Lipid panel on 1/14/2025: Total cholesterol 119, HDL 61, , triglycerides 112    Results for orders placed during the hospital encounter of 01/25/18    Adult Transthoracic Echo Complete W/ Cont if Necessary Per Protocol    Interpretation Summary  · Left ventricular systolic function is normal. Estimated EF appears to be in the range of 56 - 60%.  · Septal wall motion is abnormal, consistent with a bundle branch block.  · Left ventricular wall thickness is consistent with moderate concentric hypertrophy.  · No significant valvular abnormalities.        Assessment:          Diagnosis Plan   1. Coronary artery calcification seen on CT scan  Adult Stress Echo W/ Cont or Stress Agent if Necessary Per Protocol      2. Atypical chest pain  ECG 12 Lead      3. SOB (shortness of breath)             Plan:       1.  Coronary artery calcium score elevation: Patient did have a " coronary artery calcium score that was elevated.  She does have risk factors of family history of heart disease, personal history of hyperlipidemia.  She also has symptoms at this time which could be concerning for ischemia, therefore I think that a stress test is very reasonable.  For this reason, we will order a stress echocardiogram.  We did discuss that risk factor reduction will be important moving forward.  The patient is already taking an aspirin 81 mg daily.  We did discuss that LDL cholesterol goal should probably be at least less than 70.  She has recently changed her cholesterol medicine and therefore will have upcoming lipid screening once again to monitor the response.  In addition, regular exercise, dietary changes, etc. would all be recommended    2.  Atypical chest pain: The patient does describe chest discomfort with atypical features, however with her shortness of breath, fatigue and abnormal coronary artery calcium score, a stress test is reasonable to further evaluate for ischemia.    3.  Shortness of breath: The patient notes some degree of asthma but has not been wheezing or coughing lately and breathing has otherwise been not at baseline as she has noticed shortness of breath and dyspnea with exertion.  Coupled with her other symptoms, this does raise concern for ischemia or anginal symptoms therefore we will order a stress test as noted above.    Follow-up will be pending the results of the patient's upcoming stress test.

## 2025-06-19 ENCOUNTER — HOSPITAL ENCOUNTER (OUTPATIENT)
Dept: CARDIOLOGY | Facility: HOSPITAL | Age: 60
Discharge: HOME | End: 2025-06-19
Admitting: INTERNAL MEDICINE
Payer: COMMERCIAL

## 2025-06-19 VITALS — WEIGHT: 174 LBS | HEIGHT: 64 IN | HEART RATE: 71 BPM | BODY MASS INDEX: 29.71 KG/M2

## 2025-06-19 DIAGNOSIS — I25.10 CORONARY ARTERY CALCIFICATION SEEN ON CT SCAN: ICD-10-CM

## 2025-06-19 LAB
BH CV STRESS ATROPINE STAGE 5: 2
BH CV STRESS BP STAGE 1: NORMAL
BH CV STRESS BP STAGE 2: NORMAL
BH CV STRESS BP STAGE 3: NORMAL
BH CV STRESS BP STAGE 4: NORMAL
BH CV STRESS BP STAGE 5: NORMAL
BH CV STRESS DOSE DOBUTAMINE STAGE 1: 10
BH CV STRESS DOSE DOBUTAMINE STAGE 2: 20
BH CV STRESS DOSE DOBUTAMINE STAGE 3: 30
BH CV STRESS DOSE DOBUTAMINE STAGE 4: 40
BH CV STRESS DOSE DOBUTAMINE STAGE 5: 50
BH CV STRESS DURATION MIN STAGE 1: 3
BH CV STRESS DURATION MIN STAGE 2: 3
BH CV STRESS DURATION MIN STAGE 3: 3
BH CV STRESS DURATION MIN STAGE 4: 3
BH CV STRESS DURATION MIN STAGE 5: 1
BH CV STRESS DURATION SEC STAGE 1: 0
BH CV STRESS DURATION SEC STAGE 2: 0
BH CV STRESS DURATION SEC STAGE 3: 0
BH CV STRESS DURATION SEC STAGE 4: 0
BH CV STRESS DURATION SEC STAGE 5: 25
BH CV STRESS HR STAGE 1: 71
BH CV STRESS HR STAGE 2: 86
BH CV STRESS HR STAGE 3: 123
BH CV STRESS HR STAGE 4: 130
BH CV STRESS HR STAGE 5: 130
BH CV STRESS PROTOCOL 1: NORMAL
BH CV STRESS RECOVERY BP: NORMAL MMHG
BH CV STRESS RECOVERY HR: 100 BPM
BH CV STRESS STAGE 1: 1
BH CV STRESS STAGE 2: 2
BH CV STRESS STAGE 3: 3
BH CV STRESS STAGE 4: 4
BH CV STRESS STAGE 5: 5
MAXIMAL PREDICTED HEART RATE: 161 BPM
PERCENT MAX PREDICTED HR: 80.75 %
STRESS BASELINE BP: NORMAL MMHG
STRESS BASELINE HR: 73 BPM
STRESS PERCENT HR: 95 %
STRESS POST EXERCISE DUR MIN: 13 MIN
STRESS POST EXERCISE DUR SEC: 25 SEC
STRESS POST PEAK BP: NORMAL MMHG
STRESS POST PEAK HR: 130 BPM
STRESS TARGET HR: 137 BPM

## 2025-06-19 PROCEDURE — 25510000001 PERFLUTREN 6.52 MG/ML SUSPENSION: Performed by: INTERNAL MEDICINE

## 2025-06-19 PROCEDURE — 93017 CV STRESS TEST TRACING ONLY: CPT

## 2025-06-19 PROCEDURE — 25010000002 DOBUTAMINE PER 250 MG: Performed by: INTERNAL MEDICINE

## 2025-06-19 PROCEDURE — 25010000002 ATROPINE SULFATE 0.1 MG/ML: Performed by: INTERNAL MEDICINE

## 2025-06-19 PROCEDURE — 93350 STRESS TTE ONLY: CPT

## 2025-06-19 RX ORDER — METOPROLOL TARTRATE 1 MG/ML
5 INJECTION, SOLUTION INTRAVENOUS ONCE
Status: DISCONTINUED | OUTPATIENT
Start: 2025-06-19 | End: 2025-06-20 | Stop reason: HOSPADM

## 2025-06-19 RX ORDER — DOBUTAMINE HYDROCHLORIDE 100 MG/100ML
10-50 INJECTION INTRAVENOUS CONTINUOUS
Status: DISCONTINUED | OUTPATIENT
Start: 2025-06-19 | End: 2025-06-20 | Stop reason: HOSPADM

## 2025-06-19 RX ADMIN — DOBUTAMINE HYDROCHLORIDE 10 MCG/KG/MIN: 100 INJECTION INTRAVENOUS at 08:56

## 2025-06-19 RX ADMIN — PERFLUTREN 8.48 MG: 6.52 INJECTION, SUSPENSION INTRAVENOUS at 08:33

## 2025-06-19 RX ADMIN — ATROPINE SULFATE 2 MG: 0.1 INJECTION INTRAVENOUS at 08:57

## 2025-06-24 ENCOUNTER — HOSPITAL ENCOUNTER (OUTPATIENT)
Dept: OCCUPATIONAL THERAPY | Age: 60
Setting detail: THERAPIES SERIES
Discharge: HOME | End: 2025-06-24
Payer: COMMERCIAL

## 2025-06-24 PROCEDURE — 97535 SELF CARE MNGMENT TRAINING: CPT

## 2025-06-24 PROCEDURE — 97530 THERAPEUTIC ACTIVITIES: CPT

## 2025-06-24 PROCEDURE — 97110 THERAPEUTIC EXERCISES: CPT

## 2025-06-24 NOTE — PROGRESS NOTES
Occupational Therapy Reassessment   Patient: Halima Chan (59 y.o. female)   Examination Date:    Plan of Care Certification Period:  to Certification Period Expiration Date: 25   :  1965 MRN: [id number]  CSN: [id number]   Insurance: Payor: [employer] / Plan: BCBS -  [place] / Product Type: *No Product type* /   Insurance ID: [id number] - ([facility]) Secondary Insurance (if applicable):    Referring Physician: Keiko Butler APRN - RADHA Butler   PCP: Trice Sherman MD Visits to Date/Visits Approved:        Medical Diagnosis: Mixed incontinence [N39.46]    Treatment Diagnosis: Treatment Diagnosis: decreased muscle strength, endurance and coordination    Prognosis: Good    Subjective  Subjective: I'm feeling so much better about this.     Treatment Activities     Exercises:   OT Exercise 1: Supine legs up wall x 3 minutes  OT Exercise 4: Seated transverse abdominis contraction alone; performed seated on red therapy ball x 15 reps --UPGRAD  OT Exercise 7: Seated transverse abdominis contraction alternate UE/LE 1 x 15; completes 15 consecutive reps; performed seated on red therapy ball x 10 reps --UPGRADE  OT Exercise 11: Seated/reclined pfm contraction x 10 seconds on and 10 seconds off in supine hooklying--UPGRADE; completed 5 reps; able to burst up to 15 and sustain 8/10/15; relaxes down to 2/3; decreased cues to breathe; benefits greatly from use of visual and auditory aspects of biofeedback to sustain contraction  OT Exercise 12: Seated/reclined pfm contraction x 10 seconds on and 10 seconds off in supine hooklying with yoga block for adduction for recruitment--UPGRADE; completed 10 reps; able to burst up to 15 and sustain 8/10/15; relaxes down to 2/3; decreased cues to breathe; benefits greatly from use of visual and auditory aspects of biofeedback to sustain contraction      ASSESSMENT     Impression:    Assessment: OT reassessment completed.  Pt has met 3/4 STG's and 3/5 STG's.  She reports 80-90%

## 2025-07-01 DIAGNOSIS — R06.02 SOB (SHORTNESS OF BREATH): ICD-10-CM

## 2025-07-01 DIAGNOSIS — R07.89 ATYPICAL CHEST PAIN: ICD-10-CM

## 2025-07-01 DIAGNOSIS — I25.10 CORONARY ARTERY CALCIFICATION SEEN ON CT SCAN: Primary | ICD-10-CM

## 2025-07-02 ENCOUNTER — APPOINTMENT (OUTPATIENT)
Dept: OCCUPATIONAL THERAPY | Age: 60
End: 2025-07-02
Payer: COMMERCIAL

## 2025-07-09 ENCOUNTER — HOSPITAL ENCOUNTER (OUTPATIENT)
Dept: OCCUPATIONAL THERAPY | Age: 60
Setting detail: THERAPIES SERIES
Discharge: HOME | End: 2025-07-09
Payer: COMMERCIAL

## 2025-07-09 ENCOUNTER — HOSPITAL ENCOUNTER (OUTPATIENT)
Dept: ULTRASOUND IMAGING | Facility: HOSPITAL | Age: 60
Discharge: HOME | End: 2025-07-09
Admitting: INTERNAL MEDICINE

## 2025-07-09 DIAGNOSIS — Z13.6 ENCOUNTER FOR SCREENING FOR VASCULAR DISEASE: ICD-10-CM

## 2025-07-09 PROCEDURE — 97530 THERAPEUTIC ACTIVITIES: CPT

## 2025-07-09 PROCEDURE — 97110 THERAPEUTIC EXERCISES: CPT

## 2025-07-09 PROCEDURE — 97535 SELF CARE MNGMENT TRAINING: CPT

## 2025-07-09 PROCEDURE — 93799 UNLISTED CV SVC/PROCEDURE: CPT

## 2025-07-09 NOTE — PROGRESS NOTES
Occupational Therapy: Discharge   Patient: Halima Chan (59 y.o. female)   Examination Date:    Plan of Care Certification Period:  to Certification Period Expiration Date: 25   :  1965 MRN: [id number]  CSN: [id number]   Insurance: Payor: [employer]LE / Plan: BC -  [place] / Product Type: *No Product type* /   Insurance ID: [id number] - ([facility]) Secondary Insurance (if applicable):    Referring Physician: Keiko Butler APRN - RADHA Butler   PCP: Trice Sherman MD Visits to Date/Visits Approved:        Medical Diagnosis: Mixed incontinence [N39.46]    Treatment Diagnosis: Treatment Diagnosis: decreased muscle strength, endurance and coordination    Prognosis: Good    Subjective  Subjective: I have almost no urgency anymore.  Like...I don't have to plan my activities around the fact that I'm going to have to run to the bathroom.     Treatment Activities     Exercises:   OT Exercise 1: Supine legs up wall x 3 minutes  OT Exercise 4: Seated transverse abdominis contraction alone; performed seated on red therapy ball x 15 reps --UPGRADE  OT Exercise 5: Seated transverse abdominis contraction alternate UE 1 x 10; completes 15 consecutive reps; performed seated on red therapy ball x 10 reps --UPGRADE  OT Exercise 6: Seated transverse abdominis contraction alternate LE 1 x 10; completes 15 consecutive reps; performed seated on red therapy ball x 10 reps --UPGRADE  OT Exercise 7: Seated transverse abdominis contraction alternate UE/LE 1 x 15; completes 15 consecutive reps; performed seated on red therapy ball x 10 reps --UPGRADE  OT Exercise 9: Sit to stand with ta contraction and exhale when initiating standing up 1 x 15; demonstrates a good understanding; performed seated on red therapy ball x 10 reps --UPGRADE  OT Exercise 11: Seated/reclined pfm contraction x 10 seconds on and 10 seconds off in supine hooklying--UPGRADE; completed 5 reps; able to burst up to 15/20 (up from 15) and sustain 10/15 (up from

## 2025-07-10 ENCOUNTER — HOSPITAL ENCOUNTER (OUTPATIENT)
Dept: CARDIOLOGY | Facility: HOSPITAL | Age: 60
Discharge: HOME | End: 2025-07-10
Payer: COMMERCIAL

## 2025-07-10 VITALS
BODY MASS INDEX: 29.19 KG/M2 | WEIGHT: 171 LBS | HEIGHT: 64 IN | SYSTOLIC BLOOD PRESSURE: 119 MMHG | HEART RATE: 56 BPM | DIASTOLIC BLOOD PRESSURE: 59 MMHG

## 2025-07-10 DIAGNOSIS — R07.89 ATYPICAL CHEST PAIN: ICD-10-CM

## 2025-07-10 DIAGNOSIS — I25.10 CORONARY ARTERY CALCIFICATION SEEN ON CT SCAN: ICD-10-CM

## 2025-07-10 DIAGNOSIS — R06.02 SOB (SHORTNESS OF BREATH): ICD-10-CM

## 2025-07-10 PROCEDURE — 78452 HT MUSCLE IMAGE SPECT MULT: CPT

## 2025-07-10 PROCEDURE — 34310000005 TECHNETIUM TETROFOSMIN KIT: Performed by: INTERNAL MEDICINE

## 2025-07-10 PROCEDURE — A9502 TC99M TETROFOSMIN: HCPCS | Performed by: INTERNAL MEDICINE

## 2025-07-10 PROCEDURE — 93017 CV STRESS TEST TRACING ONLY: CPT

## 2025-07-10 PROCEDURE — 25010000002 REGADENOSON 0.4 MG/5ML SOLUTION: Performed by: INTERNAL MEDICINE

## 2025-07-10 RX ORDER — REGADENOSON 0.08 MG/ML
0.4 INJECTION, SOLUTION INTRAVENOUS ONCE
Status: COMPLETED | OUTPATIENT
Start: 2025-07-10 | End: 2025-07-10

## 2025-07-10 RX ADMIN — REGADENOSON 0.4 MG: 0.08 INJECTION, SOLUTION INTRAVENOUS at 08:47

## 2025-07-10 RX ADMIN — TETROFOSMIN 1 DOSE: 1.38 INJECTION, POWDER, LYOPHILIZED, FOR SOLUTION INTRAVENOUS at 08:47

## 2025-07-10 RX ADMIN — TETROFOSMIN 1 DOSE: 1.38 INJECTION, POWDER, LYOPHILIZED, FOR SOLUTION INTRAVENOUS at 07:30

## 2025-07-11 LAB
BH CV REST NUCLEAR ISOTOPE DOSE: 10.8 MCI
BH CV STRESS BP STAGE 1: NORMAL
BH CV STRESS COMMENTS STAGE 1: NORMAL
BH CV STRESS DOSE REGADENOSON STAGE 1: 0.4
BH CV STRESS DURATION MIN STAGE 1: 0
BH CV STRESS DURATION SEC STAGE 1: 10
BH CV STRESS HR STAGE 1: 88
BH CV STRESS NUCLEAR ISOTOPE DOSE: 32.3 MCI
BH CV STRESS PROTOCOL 1: NORMAL
BH CV STRESS RECOVERY BP: NORMAL MMHG
BH CV STRESS RECOVERY HR: 85 BPM
BH CV STRESS STAGE 1: 1
MAXIMAL PREDICTED HEART RATE: 161 BPM
PERCENT MAX PREDICTED HR: 54.66 %
SPECT HRT GATED+EF W RNC IV: 59 %
STRESS BASELINE BP: NORMAL MMHG
STRESS BASELINE HR: 57 BPM
STRESS PERCENT HR: 64 %
STRESS POST EXERCISE DUR SEC: 10 SEC
STRESS POST PEAK BP: NORMAL MMHG
STRESS POST PEAK HR: 88 BPM
STRESS TARGET HR: 137 BPM

## 2025-07-17 RX ORDER — ESTRADIOL 1 MG/1
1 TABLET ORAL DAILY
Qty: 90 TABLET | Refills: 1 | Status: SHIPPED | OUTPATIENT
Start: 2025-07-17

## 2025-07-17 NOTE — TELEPHONE ENCOUNTER
Halima Chan called to request a refill on her medication.      Last office visit : 6/5/2025   Next office visit : 8/27/2025     Requested Prescriptions     Pending Prescriptions Disp Refills    estradiol (ESTRACE) 1 MG tablet [Pharmacy Med Name: ESTRADIOL 1MG TABLETS] 90 tablet 1     Sig: TAKE 1 TABLET BY MOUTH DAILY            Debbie Abad MA

## 2025-08-20 DIAGNOSIS — Z00.00 ANNUAL PHYSICAL EXAM: ICD-10-CM

## 2025-08-20 DIAGNOSIS — I10 ESSENTIAL HYPERTENSION: ICD-10-CM

## 2025-08-20 DIAGNOSIS — R73.01 IFG (IMPAIRED FASTING GLUCOSE): ICD-10-CM

## 2025-08-20 DIAGNOSIS — E78.00 PURE HYPERCHOLESTEROLEMIA: ICD-10-CM

## 2025-08-20 DIAGNOSIS — E55.9 VITAMIN D DEFICIENCY: ICD-10-CM

## 2025-08-20 LAB
25(OH)D3 SERPL-MCNC: 53.2 NG/ML
ALBUMIN SERPL-MCNC: 4.4 G/DL (ref 3.5–5.2)
ALP SERPL-CCNC: 79 U/L (ref 35–104)
ALT SERPL-CCNC: 86 U/L (ref 10–35)
ANION GAP SERPL CALCULATED.3IONS-SCNC: 10 MMOL/L (ref 8–16)
AST SERPL-CCNC: 46 U/L (ref 10–35)
BILIRUB SERPL-MCNC: 0.9 MG/DL (ref 0.2–1.2)
BUN SERPL-MCNC: 24 MG/DL (ref 6–20)
CALCIUM SERPL-MCNC: 9.7 MG/DL (ref 8.6–10)
CHLORIDE SERPL-SCNC: 103 MMOL/L (ref 98–107)
CHOLEST SERPL-MCNC: 123 MG/DL (ref 0–199)
CO2 SERPL-SCNC: 26 MMOL/L (ref 22–29)
CREAT SERPL-MCNC: 0.6 MG/DL (ref 0.5–0.9)
GLUCOSE SERPL-MCNC: 88 MG/DL (ref 70–99)
HBA1C MFR BLD: 5.7 % (ref 4–5.6)
HDLC SERPL-MCNC: 53 MG/DL (ref 40–60)
LDLC SERPL CALC-MCNC: 58 MG/DL
POTASSIUM SERPL-SCNC: 4.3 MMOL/L (ref 3.5–5.1)
PROT SERPL-MCNC: 7 G/DL (ref 6.4–8.3)
SODIUM SERPL-SCNC: 139 MMOL/L (ref 136–145)
TRIGL SERPL-MCNC: 62 MG/DL (ref 0–149)

## 2025-08-26 ENCOUNTER — OFFICE VISIT (OUTPATIENT)
Dept: INTERNAL MEDICINE | Age: 60
End: 2025-08-26
Payer: COMMERCIAL

## 2025-08-26 ENCOUNTER — PATIENT MESSAGE (OUTPATIENT)
Dept: INTERNAL MEDICINE | Age: 60
End: 2025-08-26

## 2025-08-26 VITALS
WEIGHT: 164.6 LBS | HEIGHT: 65 IN | SYSTOLIC BLOOD PRESSURE: 118 MMHG | DIASTOLIC BLOOD PRESSURE: 76 MMHG | OXYGEN SATURATION: 99 % | HEART RATE: 80 BPM | BODY MASS INDEX: 27.42 KG/M2

## 2025-08-26 DIAGNOSIS — E55.9 VITAMIN D DEFICIENCY: ICD-10-CM

## 2025-08-26 DIAGNOSIS — I10 ESSENTIAL HYPERTENSION: ICD-10-CM

## 2025-08-26 DIAGNOSIS — R93.1 AGATSTON CORONARY ARTERY CALCIUM SCORE BETWEEN 200 AND 399: ICD-10-CM

## 2025-08-26 DIAGNOSIS — Z23 NEED FOR VACCINATION: Primary | ICD-10-CM

## 2025-08-26 DIAGNOSIS — R93.1 ELEVATED CORONARY ARTERY CALCIUM SCORE: ICD-10-CM

## 2025-08-26 DIAGNOSIS — R79.89 ELEVATED LIVER FUNCTION TESTS: ICD-10-CM

## 2025-08-26 DIAGNOSIS — E78.00 PURE HYPERCHOLESTEROLEMIA: ICD-10-CM

## 2025-08-26 DIAGNOSIS — Z79.899 ON STATIN THERAPY: ICD-10-CM

## 2025-08-26 DIAGNOSIS — R73.01 IFG (IMPAIRED FASTING GLUCOSE): ICD-10-CM

## 2025-08-26 PROCEDURE — 3074F SYST BP LT 130 MM HG: CPT | Performed by: INTERNAL MEDICINE

## 2025-08-26 PROCEDURE — 99214 OFFICE O/P EST MOD 30 MIN: CPT | Performed by: INTERNAL MEDICINE

## 2025-08-26 PROCEDURE — 3078F DIAST BP <80 MM HG: CPT | Performed by: INTERNAL MEDICINE

## 2025-08-26 RX ORDER — ZOSTER VACCINE RECOMBINANT, ADJUVANTED 50 MCG/0.5
0.5 KIT INTRAMUSCULAR SEE ADMIN INSTRUCTIONS
Qty: 0.5 ML | Refills: 0 | Status: SHIPPED | OUTPATIENT
Start: 2025-08-26 | End: 2026-02-22

## 2025-08-26 ASSESSMENT — ENCOUNTER SYMPTOMS
SORE THROAT: 0
CHEST TIGHTNESS: 0
CONSTIPATION: 0
WHEEZING: 0
COUGH: 0
ABDOMINAL PAIN: 0

## 2025-08-27 RX ORDER — LISINOPRIL AND HYDROCHLOROTHIAZIDE 10; 12.5 MG/1; MG/1
1 TABLET ORAL DAILY
Qty: 90 TABLET | Refills: 1 | Status: SHIPPED | OUTPATIENT
Start: 2025-08-27 | End: 2025-08-28 | Stop reason: SDUPTHER

## 2025-08-28 RX ORDER — LISINOPRIL AND HYDROCHLOROTHIAZIDE 10; 12.5 MG/1; MG/1
1 TABLET ORAL DAILY
Qty: 90 TABLET | Refills: 1 | Status: SHIPPED | OUTPATIENT
Start: 2025-08-28

## (undated) DEVICE — TBG SMPL FLTR LINE NASL 02/C02 A/ BX/100

## (undated) DEVICE — MASK,OXYGEN,MED CONC,ADLT,7' TUB, UC: Brand: PENDING

## (undated) DEVICE — YANKAUER,BULB TIP WITH VENT: Brand: ARGYLE

## (undated) DEVICE — Device: Brand: DEFENDO AIR/WATER/SUCTION AND BIOPSY VALVE

## (undated) DEVICE — SENSR O2 OXIMAX FNGR A/ 18IN NONSTR

## (undated) DEVICE — THE CHANNEL CLEANING BRUSH IS A NYLON FLEXI BRUSH ATTACHED TO A FLEXIBLE PLASTIC SHEATH DESIGNED TO SAFELY REMOVE DEBRIS FROM FLEXIBLE ENDOSCOPES.